# Patient Record
Sex: FEMALE | Race: WHITE | NOT HISPANIC OR LATINO | Employment: OTHER
[De-identification: names, ages, dates, MRNs, and addresses within clinical notes are randomized per-mention and may not be internally consistent; named-entity substitution may affect disease eponyms.]

---

## 2017-02-20 ENCOUNTER — RECORDS - HEALTHEAST (OUTPATIENT)
Dept: ADMINISTRATIVE | Facility: OTHER | Age: 77
End: 2017-02-20

## 2017-02-23 ENCOUNTER — RECORDS - HEALTHEAST (OUTPATIENT)
Dept: LAB | Facility: CLINIC | Age: 77
End: 2017-02-23

## 2017-02-23 LAB
CHOLEST SERPL-MCNC: 305 MG/DL
FASTING STATUS PATIENT QL REPORTED: YES
HDLC SERPL-MCNC: 36 MG/DL
LDLC SERPL CALC-MCNC: 216 MG/DL
TRIGL SERPL-MCNC: 264 MG/DL

## 2017-02-27 ENCOUNTER — RECORDS - HEALTHEAST (OUTPATIENT)
Dept: LAB | Facility: CLINIC | Age: 77
End: 2017-02-27

## 2017-02-27 LAB — AST SERPL W P-5'-P-CCNC: 16 U/L (ref 0–40)

## 2017-02-28 ENCOUNTER — HOSPITAL ENCOUNTER (OUTPATIENT)
Dept: MAMMOGRAPHY | Facility: HOSPITAL | Age: 77
Discharge: HOME | End: 2017-02-28
Attending: FAMILY MEDICINE

## 2017-02-28 DIAGNOSIS — Z12.31 VISIT FOR SCREENING MAMMOGRAM: ICD-10-CM

## 2018-03-01 ENCOUNTER — HOSPITAL ENCOUNTER (OUTPATIENT)
Dept: MAMMOGRAPHY | Facility: HOSPITAL | Age: 78
Discharge: HOME | End: 2018-03-01
Attending: FAMILY MEDICINE

## 2018-03-01 DIAGNOSIS — Z12.31 VISIT FOR SCREENING MAMMOGRAM: ICD-10-CM

## 2018-04-13 ENCOUNTER — RECORDS - HEALTHEAST (OUTPATIENT)
Dept: ADMINISTRATIVE | Facility: OTHER | Age: 78
End: 2018-04-13

## 2018-04-15 ENCOUNTER — COMMUNICATION - HEALTHEAST (OUTPATIENT)
Dept: SCHEDULING | Facility: CLINIC | Age: 78
End: 2018-04-15

## 2018-04-18 ENCOUNTER — AMBULATORY - HEALTHEAST (OUTPATIENT)
Dept: CARDIOLOGY | Facility: CLINIC | Age: 78
End: 2018-04-18

## 2018-04-18 ENCOUNTER — OFFICE VISIT - HEALTHEAST (OUTPATIENT)
Dept: CARDIOLOGY | Facility: CLINIC | Age: 78
End: 2018-04-18

## 2018-04-18 DIAGNOSIS — R07.2 PRECORDIAL PAIN: ICD-10-CM

## 2018-04-18 DIAGNOSIS — I10 ESSENTIAL HYPERTENSION: ICD-10-CM

## 2018-04-18 DIAGNOSIS — I48.0 PAROXYSMAL ATRIAL FIBRILLATION (H): ICD-10-CM

## 2018-04-18 LAB
ATRIAL RATE - MUSE: 54 BPM
DIASTOLIC BLOOD PRESSURE - MUSE: NORMAL MMHG
INTERPRETATION ECG - MUSE: NORMAL
P AXIS - MUSE: -2 DEGREES
PR INTERVAL - MUSE: 150 MS
QRS DURATION - MUSE: 78 MS
QT - MUSE: 486 MS
QTC - MUSE: 460 MS
R AXIS - MUSE: 12 DEGREES
SYSTOLIC BLOOD PRESSURE - MUSE: NORMAL MMHG
T AXIS - MUSE: 31 DEGREES
VENTRICULAR RATE- MUSE: 54 BPM

## 2018-04-18 RX ORDER — AMOXICILLIN 500 MG/1
CAPSULE ORAL DAILY PRN
Refills: 0 | Status: SHIPPED | COMMUNITY
Start: 2018-01-30

## 2018-04-18 ASSESSMENT — MIFFLIN-ST. JEOR: SCORE: 1129.19

## 2018-04-19 ENCOUNTER — RECORDS - HEALTHEAST (OUTPATIENT)
Dept: LAB | Facility: CLINIC | Age: 78
End: 2018-04-19

## 2018-04-19 LAB
ANION GAP SERPL CALCULATED.3IONS-SCNC: 16 MMOL/L (ref 5–18)
BUN SERPL-MCNC: 35 MG/DL (ref 8–28)
CALCIUM SERPL-MCNC: 10.1 MG/DL (ref 8.5–10.5)
CHLORIDE BLD-SCNC: 104 MMOL/L (ref 98–107)
CHOLEST SERPL-MCNC: 173 MG/DL
CO2 SERPL-SCNC: 20 MMOL/L (ref 22–31)
CREAT SERPL-MCNC: 1.05 MG/DL (ref 0.6–1.1)
FASTING STATUS PATIENT QL REPORTED: ABNORMAL
GFR SERPL CREATININE-BSD FRML MDRD: 51 ML/MIN/1.73M2
GLUCOSE BLD-MCNC: 113 MG/DL (ref 70–125)
HDLC SERPL-MCNC: 39 MG/DL
LDLC SERPL CALC-MCNC: 101 MG/DL
POTASSIUM BLD-SCNC: 4.1 MMOL/L (ref 3.5–5)
SODIUM SERPL-SCNC: 140 MMOL/L (ref 136–145)
TRIGL SERPL-MCNC: 167 MG/DL
TSH SERPL DL<=0.005 MIU/L-ACNC: 1.86 UIU/ML (ref 0.3–5)

## 2018-04-20 ENCOUNTER — COMMUNICATION - HEALTHEAST (OUTPATIENT)
Dept: CARE COORDINATION | Facility: CLINIC | Age: 78
End: 2018-04-20

## 2018-04-20 ENCOUNTER — COMMUNICATION - HEALTHEAST (OUTPATIENT)
Dept: CARDIOLOGY | Facility: CLINIC | Age: 78
End: 2018-04-20

## 2018-04-30 ENCOUNTER — HOSPITAL ENCOUNTER (OUTPATIENT)
Dept: NUCLEAR MEDICINE | Facility: HOSPITAL | Age: 78
Discharge: HOME | End: 2018-04-30
Attending: INTERNAL MEDICINE

## 2018-04-30 ENCOUNTER — HOSPITAL ENCOUNTER (OUTPATIENT)
Dept: CARDIOLOGY | Facility: HOSPITAL | Age: 78
Discharge: HOME | End: 2018-04-30
Attending: INTERNAL MEDICINE

## 2018-04-30 DIAGNOSIS — R07.2 PRECORDIAL PAIN: ICD-10-CM

## 2018-04-30 DIAGNOSIS — I48.0 PAROXYSMAL ATRIAL FIBRILLATION (H): ICD-10-CM

## 2018-04-30 LAB
CV STRESS CURRENT BP HE: NORMAL
CV STRESS CURRENT HR HE: 100
CV STRESS CURRENT HR HE: 61
CV STRESS CURRENT HR HE: 62
CV STRESS CURRENT HR HE: 69
CV STRESS CURRENT HR HE: 83
CV STRESS CURRENT HR HE: 83
CV STRESS CURRENT HR HE: 84
CV STRESS CURRENT HR HE: 85
CV STRESS CURRENT HR HE: 85
CV STRESS CURRENT HR HE: 87
CV STRESS CURRENT HR HE: 87
CV STRESS CURRENT HR HE: 92
CV STRESS CURRENT HR HE: 99
CV STRESS DEVIATION TIME HE: NORMAL
CV STRESS ECHO PERCENT HR HE: NORMAL
CV STRESS EXERCISE STAGE HE: NORMAL
CV STRESS FINAL RESTING BP HE: NORMAL
CV STRESS FINAL RESTING HR HE: 85
CV STRESS MAX HR HE: 100
CV STRESS MAX TREADMILL GRADE HE: 0
CV STRESS MAX TREADMILL SPEED HE: 0
CV STRESS PEAK DIA BP HE: NORMAL
CV STRESS PEAK SYS BP HE: NORMAL
CV STRESS PHASE HE: NORMAL
CV STRESS PROTOCOL HE: NORMAL
CV STRESS RESTING PT POSITION HE: NORMAL
CV STRESS ST DEVIATION AMOUNT HE: NORMAL
CV STRESS ST DEVIATION ELEVATION HE: NORMAL
CV STRESS ST EVELATION AMOUNT HE: NORMAL
CV STRESS TEST TYPE HE: NORMAL
CV STRESS TOTAL STAGE TIME MIN 1 HE: NORMAL
NUC STRESS EJECTION FRACTION: 75 %
STRESS ECHO BASELINE BP: NORMAL
STRESS ECHO BASELINE HR: 61
STRESS ECHO CALCULATED PERCENT HR: 69 %
STRESS ECHO LAST STRESS BP: NORMAL
STRESS ECHO LAST STRESS HR: 87

## 2018-04-30 ASSESSMENT — MIFFLIN-ST. JEOR: SCORE: 1156.74

## 2018-05-07 ENCOUNTER — OFFICE VISIT - HEALTHEAST (OUTPATIENT)
Dept: CARDIOLOGY | Facility: CLINIC | Age: 78
End: 2018-05-07

## 2018-05-07 ENCOUNTER — COMMUNICATION - HEALTHEAST (OUTPATIENT)
Dept: CARDIOLOGY | Facility: CLINIC | Age: 78
End: 2018-05-07

## 2018-05-07 DIAGNOSIS — I50.30 HEART FAILURE WITH PRESERVED EJECTION FRACTION (H): ICD-10-CM

## 2018-05-07 DIAGNOSIS — I10 ESSENTIAL HYPERTENSION: ICD-10-CM

## 2018-05-07 DIAGNOSIS — I48.0 PAROXYSMAL ATRIAL FIBRILLATION (H): ICD-10-CM

## 2018-05-07 ASSESSMENT — MIFFLIN-ST. JEOR: SCORE: 1128.39

## 2018-06-04 ENCOUNTER — COMMUNICATION - HEALTHEAST (OUTPATIENT)
Dept: CARE COORDINATION | Facility: CLINIC | Age: 78
End: 2018-06-04

## 2018-06-07 ENCOUNTER — COMMUNICATION - HEALTHEAST (OUTPATIENT)
Dept: CARDIOLOGY | Facility: CLINIC | Age: 78
End: 2018-06-07

## 2018-07-09 ENCOUNTER — AMBULATORY - HEALTHEAST (OUTPATIENT)
Dept: CARDIOLOGY | Facility: CLINIC | Age: 78
End: 2018-07-09

## 2018-07-09 ENCOUNTER — RECORDS - HEALTHEAST (OUTPATIENT)
Dept: ADMINISTRATIVE | Facility: OTHER | Age: 78
End: 2018-07-09

## 2018-07-11 ENCOUNTER — OFFICE VISIT - HEALTHEAST (OUTPATIENT)
Dept: CARDIOLOGY | Facility: CLINIC | Age: 78
End: 2018-07-11

## 2018-07-11 DIAGNOSIS — I48.0 PAROXYSMAL ATRIAL FIBRILLATION (H): ICD-10-CM

## 2018-07-11 DIAGNOSIS — I10 ESSENTIAL HYPERTENSION: ICD-10-CM

## 2018-07-11 LAB
ATRIAL RATE - MUSE: 69 BPM
DIASTOLIC BLOOD PRESSURE - MUSE: NORMAL MMHG
INTERPRETATION ECG - MUSE: NORMAL
P AXIS - MUSE: 27 DEGREES
PR INTERVAL - MUSE: 154 MS
QRS DURATION - MUSE: 78 MS
QT - MUSE: 412 MS
QTC - MUSE: 441 MS
R AXIS - MUSE: 16 DEGREES
SYSTOLIC BLOOD PRESSURE - MUSE: NORMAL MMHG
T AXIS - MUSE: 38 DEGREES
VENTRICULAR RATE- MUSE: 69 BPM

## 2018-07-11 ASSESSMENT — MIFFLIN-ST. JEOR: SCORE: 1120.46

## 2018-12-27 ENCOUNTER — COMMUNICATION - HEALTHEAST (OUTPATIENT)
Dept: ADMINISTRATIVE | Facility: CLINIC | Age: 78
End: 2018-12-27

## 2019-04-04 ENCOUNTER — RECORDS - HEALTHEAST (OUTPATIENT)
Dept: LAB | Facility: CLINIC | Age: 79
End: 2019-04-04

## 2019-04-05 LAB
ANION GAP SERPL CALCULATED.3IONS-SCNC: 12 MMOL/L (ref 5–18)
BUN SERPL-MCNC: 18 MG/DL (ref 8–28)
CALCIUM SERPL-MCNC: 10.2 MG/DL (ref 8.5–10.5)
CHLORIDE BLD-SCNC: 106 MMOL/L (ref 98–107)
CHOLEST SERPL-MCNC: 165 MG/DL
CO2 SERPL-SCNC: 23 MMOL/L (ref 22–31)
CREAT SERPL-MCNC: 0.68 MG/DL (ref 0.6–1.1)
FASTING STATUS PATIENT QL REPORTED: ABNORMAL
GFR SERPL CREATININE-BSD FRML MDRD: >60 ML/MIN/1.73M2
GLUCOSE BLD-MCNC: 97 MG/DL (ref 70–125)
HDLC SERPL-MCNC: 43 MG/DL
LDLC SERPL CALC-MCNC: 76 MG/DL
POTASSIUM BLD-SCNC: 3.9 MMOL/L (ref 3.5–5)
SODIUM SERPL-SCNC: 141 MMOL/L (ref 136–145)
TRIGL SERPL-MCNC: 229 MG/DL

## 2019-04-08 ENCOUNTER — RECORDS - HEALTHEAST (OUTPATIENT)
Dept: ADMINISTRATIVE | Facility: OTHER | Age: 79
End: 2019-04-08

## 2019-04-08 ENCOUNTER — AMBULATORY - HEALTHEAST (OUTPATIENT)
Dept: CARDIOLOGY | Facility: CLINIC | Age: 79
End: 2019-04-08

## 2019-04-12 ENCOUNTER — OFFICE VISIT - HEALTHEAST (OUTPATIENT)
Dept: CARDIOLOGY | Facility: CLINIC | Age: 79
End: 2019-04-12

## 2019-04-12 DIAGNOSIS — I48.0 PAROXYSMAL ATRIAL FIBRILLATION (H): ICD-10-CM

## 2019-04-12 DIAGNOSIS — I10 ESSENTIAL HYPERTENSION: ICD-10-CM

## 2019-04-12 RX ORDER — FLUOXETINE 10 MG/1
10 CAPSULE ORAL DAILY
Refills: 11 | Status: SHIPPED | COMMUNITY
Start: 2019-04-02

## 2019-04-12 ASSESSMENT — MIFFLIN-ST. JEOR: SCORE: 1110.81

## 2019-04-16 LAB
ATRIAL RATE - MUSE: 67 BPM
DIASTOLIC BLOOD PRESSURE - MUSE: NORMAL MMHG
INTERPRETATION ECG - MUSE: NORMAL
P AXIS - MUSE: 9 DEGREES
PR INTERVAL - MUSE: 140 MS
QRS DURATION - MUSE: 70 MS
QT - MUSE: 414 MS
QTC - MUSE: 437 MS
R AXIS - MUSE: 42 DEGREES
SYSTOLIC BLOOD PRESSURE - MUSE: NORMAL MMHG
T AXIS - MUSE: 52 DEGREES
VENTRICULAR RATE- MUSE: 67 BPM

## 2019-04-20 ENCOUNTER — COMMUNICATION - HEALTHEAST (OUTPATIENT)
Dept: CARDIOLOGY | Facility: CLINIC | Age: 79
End: 2019-04-20

## 2019-04-20 DIAGNOSIS — I48.0 PAROXYSMAL ATRIAL FIBRILLATION (H): ICD-10-CM

## 2019-04-28 ENCOUNTER — COMMUNICATION - HEALTHEAST (OUTPATIENT)
Dept: CARDIOLOGY | Facility: CLINIC | Age: 79
End: 2019-04-28

## 2019-04-28 DIAGNOSIS — I48.0 PAROXYSMAL ATRIAL FIBRILLATION (H): ICD-10-CM

## 2019-09-11 ENCOUNTER — RECORDS - HEALTHEAST (OUTPATIENT)
Dept: LAB | Facility: CLINIC | Age: 79
End: 2019-09-11

## 2019-09-11 LAB
ANION GAP SERPL CALCULATED.3IONS-SCNC: 10 MMOL/L (ref 5–18)
BUN SERPL-MCNC: 22 MG/DL (ref 8–28)
CALCIUM SERPL-MCNC: 10 MG/DL (ref 8.5–10.5)
CHLORIDE BLD-SCNC: 105 MMOL/L (ref 98–107)
CO2 SERPL-SCNC: 27 MMOL/L (ref 22–31)
CREAT SERPL-MCNC: 0.71 MG/DL (ref 0.6–1.1)
GFR SERPL CREATININE-BSD FRML MDRD: >60 ML/MIN/1.73M2
GLUCOSE BLD-MCNC: 92 MG/DL (ref 70–125)
POTASSIUM BLD-SCNC: 4.2 MMOL/L (ref 3.5–5)
SODIUM SERPL-SCNC: 142 MMOL/L (ref 136–145)

## 2020-01-23 ENCOUNTER — COMMUNICATION - HEALTHEAST (OUTPATIENT)
Dept: CARDIOLOGY | Facility: CLINIC | Age: 80
End: 2020-01-23

## 2020-01-23 DIAGNOSIS — I48.0 PAROXYSMAL ATRIAL FIBRILLATION (H): ICD-10-CM

## 2020-02-02 ENCOUNTER — COMMUNICATION - HEALTHEAST (OUTPATIENT)
Dept: CARDIOLOGY | Facility: CLINIC | Age: 80
End: 2020-02-02

## 2020-02-02 DIAGNOSIS — I48.0 PAROXYSMAL ATRIAL FIBRILLATION (H): ICD-10-CM

## 2020-03-24 ENCOUNTER — RECORDS - HEALTHEAST (OUTPATIENT)
Dept: ADMINISTRATIVE | Facility: OTHER | Age: 80
End: 2020-03-24

## 2020-03-24 ENCOUNTER — AMBULATORY - HEALTHEAST (OUTPATIENT)
Dept: CARDIOLOGY | Facility: CLINIC | Age: 80
End: 2020-03-24

## 2020-03-27 ENCOUNTER — COMMUNICATION - HEALTHEAST (OUTPATIENT)
Dept: CARDIOLOGY | Facility: CLINIC | Age: 80
End: 2020-03-27

## 2020-03-27 ENCOUNTER — OFFICE VISIT - HEALTHEAST (OUTPATIENT)
Dept: CARDIOLOGY | Facility: CLINIC | Age: 80
End: 2020-03-27

## 2020-03-27 DIAGNOSIS — I48.0 PAROXYSMAL ATRIAL FIBRILLATION (H): ICD-10-CM

## 2020-03-27 DIAGNOSIS — I10 ESSENTIAL HYPERTENSION: ICD-10-CM

## 2020-04-21 ENCOUNTER — COMMUNICATION - HEALTHEAST (OUTPATIENT)
Dept: CARDIOLOGY | Facility: CLINIC | Age: 80
End: 2020-04-21

## 2020-04-21 DIAGNOSIS — I48.0 PAROXYSMAL ATRIAL FIBRILLATION (H): ICD-10-CM

## 2020-05-21 ENCOUNTER — RECORDS - HEALTHEAST (OUTPATIENT)
Dept: LAB | Facility: CLINIC | Age: 80
End: 2020-05-21

## 2020-05-21 LAB
ANION GAP SERPL CALCULATED.3IONS-SCNC: 11 MMOL/L (ref 5–18)
BUN SERPL-MCNC: 23 MG/DL (ref 8–28)
CALCIUM SERPL-MCNC: 9.4 MG/DL (ref 8.5–10.5)
CHLORIDE BLD-SCNC: 105 MMOL/L (ref 98–107)
CHOLEST SERPL-MCNC: 176 MG/DL
CO2 SERPL-SCNC: 26 MMOL/L (ref 22–31)
CREAT SERPL-MCNC: 0.72 MG/DL (ref 0.6–1.1)
FASTING STATUS PATIENT QL REPORTED: ABNORMAL
GFR SERPL CREATININE-BSD FRML MDRD: >60 ML/MIN/1.73M2
GLUCOSE BLD-MCNC: 97 MG/DL (ref 70–125)
HDLC SERPL-MCNC: 41 MG/DL
LDLC SERPL CALC-MCNC: 97 MG/DL
POTASSIUM BLD-SCNC: 4.2 MMOL/L (ref 3.5–5)
SODIUM SERPL-SCNC: 142 MMOL/L (ref 136–145)
TRIGL SERPL-MCNC: 189 MG/DL
URATE SERPL-MCNC: 5.7 MG/DL (ref 2–7.5)

## 2020-07-17 ENCOUNTER — COMMUNICATION - HEALTHEAST (OUTPATIENT)
Dept: CARDIOLOGY | Facility: CLINIC | Age: 80
End: 2020-07-17

## 2020-07-17 DIAGNOSIS — I48.0 PAROXYSMAL ATRIAL FIBRILLATION (H): ICD-10-CM

## 2021-01-21 ENCOUNTER — COMMUNICATION - HEALTHEAST (OUTPATIENT)
Dept: CARDIOLOGY | Facility: CLINIC | Age: 81
End: 2021-01-21

## 2021-01-21 DIAGNOSIS — I48.0 PAROXYSMAL ATRIAL FIBRILLATION (H): ICD-10-CM

## 2021-02-26 ENCOUNTER — OFFICE VISIT - HEALTHEAST (OUTPATIENT)
Dept: CARDIOLOGY | Facility: CLINIC | Age: 81
End: 2021-02-26

## 2021-02-26 DIAGNOSIS — I10 ESSENTIAL HYPERTENSION: ICD-10-CM

## 2021-02-26 DIAGNOSIS — I48.0 PAROXYSMAL ATRIAL FIBRILLATION (H): ICD-10-CM

## 2021-02-26 RX ORDER — AMLODIPINE BESYLATE 5 MG/1
5 TABLET ORAL DAILY
Qty: 90 TABLET | Refills: 3 | Status: SHIPPED | OUTPATIENT
Start: 2021-02-26 | End: 2022-02-16

## 2021-04-12 ENCOUNTER — RECORDS - HEALTHEAST (OUTPATIENT)
Dept: ADMINISTRATIVE | Facility: OTHER | Age: 81
End: 2021-04-12

## 2021-04-12 ENCOUNTER — AMBULATORY - HEALTHEAST (OUTPATIENT)
Dept: CARDIOLOGY | Facility: CLINIC | Age: 81
End: 2021-04-12

## 2021-04-14 ENCOUNTER — OFFICE VISIT - HEALTHEAST (OUTPATIENT)
Dept: CARDIOLOGY | Facility: CLINIC | Age: 81
End: 2021-04-14

## 2021-04-14 DIAGNOSIS — I10 ESSENTIAL HYPERTENSION: ICD-10-CM

## 2021-04-14 DIAGNOSIS — I48.0 PAROXYSMAL ATRIAL FIBRILLATION (H): ICD-10-CM

## 2021-04-14 ASSESSMENT — MIFFLIN-ST. JEOR: SCORE: 1178.85

## 2021-04-25 ENCOUNTER — COMMUNICATION - HEALTHEAST (OUTPATIENT)
Dept: CARDIOLOGY | Facility: CLINIC | Age: 81
End: 2021-04-25

## 2021-04-25 DIAGNOSIS — I48.0 PAROXYSMAL ATRIAL FIBRILLATION (H): ICD-10-CM

## 2021-04-26 RX ORDER — APIXABAN 5 MG/1
TABLET, FILM COATED ORAL
Qty: 180 TABLET | Refills: 0 | Status: SHIPPED | OUTPATIENT
Start: 2021-04-26 | End: 2021-07-15

## 2021-04-26 RX ORDER — SOTALOL HYDROCHLORIDE 80 MG/1
80 TABLET ORAL EVERY 12 HOURS
Qty: 180 TABLET | Refills: 0 | Status: SHIPPED | OUTPATIENT
Start: 2021-04-26 | End: 2021-07-21

## 2021-05-19 ENCOUNTER — RECORDS - HEALTHEAST (OUTPATIENT)
Dept: LAB | Facility: CLINIC | Age: 81
End: 2021-05-19

## 2021-05-19 LAB
ANION GAP SERPL CALCULATED.3IONS-SCNC: 12 MMOL/L (ref 5–18)
BUN SERPL-MCNC: 18 MG/DL (ref 8–28)
CALCIUM SERPL-MCNC: 9 MG/DL (ref 8.5–10.5)
CHLORIDE BLD-SCNC: 102 MMOL/L (ref 98–107)
CHOLEST SERPL-MCNC: 179 MG/DL
CO2 SERPL-SCNC: 28 MMOL/L (ref 22–31)
CREAT SERPL-MCNC: 0.72 MG/DL (ref 0.6–1.1)
FASTING STATUS PATIENT QL REPORTED: ABNORMAL
GFR SERPL CREATININE-BSD FRML MDRD: >60 ML/MIN/1.73M2
GLUCOSE BLD-MCNC: 88 MG/DL (ref 70–125)
HDLC SERPL-MCNC: 40 MG/DL
LDLC SERPL CALC-MCNC: 96 MG/DL
POTASSIUM BLD-SCNC: 3.4 MMOL/L (ref 3.5–5)
SODIUM SERPL-SCNC: 142 MMOL/L (ref 136–145)
TRIGL SERPL-MCNC: 215 MG/DL

## 2021-05-25 ENCOUNTER — RECORDS - HEALTHEAST (OUTPATIENT)
Dept: ADMINISTRATIVE | Facility: CLINIC | Age: 81
End: 2021-05-25

## 2021-05-27 ENCOUNTER — RECORDS - HEALTHEAST (OUTPATIENT)
Dept: ADMINISTRATIVE | Facility: CLINIC | Age: 81
End: 2021-05-27

## 2021-05-28 ENCOUNTER — RECORDS - HEALTHEAST (OUTPATIENT)
Dept: ADMINISTRATIVE | Facility: CLINIC | Age: 81
End: 2021-05-28

## 2021-05-30 VITALS — WEIGHT: 153 LBS | BODY MASS INDEX: 27.54 KG/M2

## 2021-05-30 VITALS — HEIGHT: 63 IN | BODY MASS INDEX: 28.62 KG/M2

## 2021-06-01 VITALS — BODY MASS INDEX: 29.79 KG/M2 | WEIGHT: 157.8 LBS | HEIGHT: 61 IN

## 2021-06-01 VITALS — WEIGHT: 155 LBS | HEIGHT: 61 IN | BODY MASS INDEX: 29.27 KG/M2

## 2021-06-01 VITALS — HEIGHT: 63 IN | BODY MASS INDEX: 27.64 KG/M2 | WEIGHT: 156 LBS

## 2021-06-01 VITALS — WEIGHT: 155 LBS | HEIGHT: 62 IN | BODY MASS INDEX: 28.52 KG/M2

## 2021-06-03 VITALS — BODY MASS INDEX: 28.7 KG/M2 | HEIGHT: 61 IN | WEIGHT: 152 LBS

## 2021-06-04 VITALS
WEIGHT: 165 LBS | HEART RATE: 69 BPM | SYSTOLIC BLOOD PRESSURE: 117 MMHG | BODY MASS INDEX: 30.92 KG/M2 | DIASTOLIC BLOOD PRESSURE: 79 MMHG

## 2021-06-05 VITALS
HEART RATE: 76 BPM | WEIGHT: 167 LBS | RESPIRATION RATE: 14 BRPM | SYSTOLIC BLOOD PRESSURE: 120 MMHG | BODY MASS INDEX: 31.53 KG/M2 | DIASTOLIC BLOOD PRESSURE: 78 MMHG | HEIGHT: 61 IN

## 2021-06-05 VITALS
RESPIRATION RATE: 16 BRPM | BODY MASS INDEX: 30.36 KG/M2 | DIASTOLIC BLOOD PRESSURE: 90 MMHG | SYSTOLIC BLOOD PRESSURE: 148 MMHG | WEIGHT: 162 LBS | HEART RATE: 72 BPM | OXYGEN SATURATION: 100 %

## 2021-06-07 NOTE — TELEPHONE ENCOUNTER
----- Message from Trevin Soliman MD sent at 3/23/2020  3:44 PM CDT -----  Regarding: RE: clinic visit 3/27  If patient is comfortable with it, feel we could actually push the visit out a few months (3) and do EDG at time. If patient would like to do a phone visit now as well, that would be fine.   ----- Message -----  From: Deisy Vásquez RN  Sent: 3/23/2020   1:45 PM CDT  To: Trevin Soliman MD  Subject: clinic visit 3/27                                Hi,  Patient scheduled in clinic visit 3/27.  Will she need an ECG for Qt check or can this visit be changed to tele?  Thank you- Deisy

## 2021-06-16 PROBLEM — I48.91 ATRIAL FIBRILLATION (H): Status: ACTIVE | Noted: 2018-04-13

## 2021-06-16 PROBLEM — I50.30 HEART FAILURE WITH PRESERVED EJECTION FRACTION (H): Status: ACTIVE | Noted: 2018-05-07

## 2021-06-26 ENCOUNTER — HEALTH MAINTENANCE LETTER (OUTPATIENT)
Age: 81
End: 2021-06-26

## 2021-06-26 NOTE — PROGRESS NOTES
Progress Notes by Trevin Soliman MD at 7/11/2018 12:50 PM     Author: Trevin Soliman MD Service: -- Author Type: Physician    Filed: 7/11/2018  1:37 PM Encounter Date: 7/11/2018 Status: Signed    : Trevin Soliman MD (Physician)                  [email or web address]  [phone]         Thank you Dr. Borrego for asking the [facility] Heart Care team to participate in the care of your patient, Vanessa Matthew.     Impression and Plan     1.  Atrial fibrillation (paroxysmal).  This has been subjectively and objectively quiescent since her dismissal from the hospital in April 2018.  12-lead ECG performed in the office today confirms maintenance of sinus rhythm with reasonable QTc at 441 ms. Elysia [id number] Score is 4 yielding an annual embolic risk of 4.8-6.7%.  She has been maintained on apixaban (Eliquis ) for CVA prophylaxis. Plan:    Continue sotalol.    Continue apixaban (Eliquis ) for CVA prophylaxis.    3.  Hypertension.  Blood pressure is quite reasonable knee office today at 116/70 mmHg.    Plan on follow-up in 6 months.           History of Present Illness    Once again I would like to thank you again for asking me to participate in the care of your patient, Vanessa Matthew.  As you know, but to reiterate for my own records, Vanessa Matthew is a 77 y.o. female seen on 14 April 2018.  Patient was noted to have evidence of paroxysmal atrial fibrillation.  She had spontaneously converted during that hospitalization.  Patient was started on sotalol and also apixaban (Eliquis ) for CVA prophylaxis.     Since her last visit, Vanessa states she has had no subjective recurrence of the atrial fibrillation.  Patient is without cardiovascular complaints today.  She denies chest pain, shortness of breath, or any symptoms of lightheadedness.  She is pleased with how she is performing from a cardiovascular standpoint.    Further review of systems is otherwise negative/noncontributory  "(medical record and 13 point review of systems reviewed as well and pertinent positives noted).         Cardiac Diagnostics      Echocardiogram 13 April 2018 (personally reviewed):  1. The left ventricular cavity is somewhat small.  Left ventricular systolic performance is mildly hyperdynamic with ejection fraction of 70%.   2. There is mild concentric increase in left ventricular wall thickness.  3. No significant valvular heart disease is identified on this study.   4. Normal right ventricular size and systolic performance.   5. There is mild to moderate left atrial enlargement.     Nuclear perfusion imaging study 30 April 2018:  1. No evidence of infarct or ischemia.  2. Normal left ventricular systolic performance with ejection fraction of 75    12-lead ECG (personally reviewed) 11 July 2018: Sinus rhythm.   ms.    12-lead ECG (personally reviewed) 18 April 2018: Sinus rhythm.  Heart rate 54 bpm.  QTc 460 ms.     12-lead ECG (personally reviewed) 15 April 2018 at 1555: Normal sinus rhythm.  Normal ECG.     12-lead ECG (personally reviewed) 15 April 2018 at 0959: Normal sinus rhythm.  Normal ECG.     12-lead ECG (personally reviewed) 13 April 2018 at 1512:   Atrial fibrillation with heart rate of 99 bpm.  Nonspecific T-wave changes.         Physical Examination       /70 (Patient Site: Right Arm, Patient Position: Sitting, Cuff Size: Adult Regular)  Pulse 76  Resp 16  Ht 5' 1\" (1.549 m)  Wt 155 lb (70.3 kg)  BMI 29.29 kg/m2        Wt Readings from Last 3 Encounters:   07/11/18 155 lb (70.3 kg)   05/07/18 155 lb (70.3 kg)   04/30/18 156 lb (70.8 kg)     The patient is alert and oriented times three. Sclerae are anicteric. Mucosal membranes are moist. Jugular venous pressure is normal. No significant adenopathy/thyromegally appreciated. Lungs are clear with good expansion. On cardiovascular exam, the patient has a regular S1 and S2. Abdomen is soft and non-tender. Extremities reveal no clubbing, " cyanosis, or edema.           Family History/Social History/Risk Factors   Patient does not smoke.  Family history of hypertension.         Medications  Allergies   Current Outpatient Prescriptions   Medication Sig Dispense Refill   ? acetaminophen (TYLENOL) 500 MG tablet Take 1,000 mg by mouth every 6 (six) hours as needed for pain.     ? allopurinol (ZYLOPRIM) 100 MG tablet Take 100 mg by mouth every other day.      ? amoxicillin (AMOXIL) 500 MG capsule daily as needed (With dental appointments).   0   ? apixaban (ELIQUIS) 5 mg Tab tablet Take 1 tablet (5 mg total) by mouth 2 (two) times a day. 60 tablet 11   ? calcium carbonate (OS-SARAI) 600 mg (1,500 mg) tablet Take 600 mg by mouth daily.     ? furosemide (LASIX) 40 MG tablet Take 20 mg by mouth daily.     ? losartan (COZAAR) 100 MG tablet Take 100 mg by mouth daily.     ? sotalol (BETAPACE) 80 MG tablet Take 1 tablet (80 mg total) by mouth every 12 (twelve) hours. 60 tablet 10   ? VALERIAN ORAL Take 1,030 mg by mouth at bedtime as needed (sleep).       No current facility-administered medications for this visit.       Allergies   Allergen Reactions   ? Lipitor [Atorvastatin]      numbness   ? Pravastatin      numbness   ? Iodine Rash          Lab Results   Lab Results   Component Value Date     04/19/2018    K 4.1 04/19/2018     04/19/2018    CO2 20 (L) 04/19/2018    BUN 35 (H) 04/19/2018    CREATININE 1.05 04/19/2018    CALCIUM 10.1 04/19/2018     Lab Results   Component Value Date    WBC 8.9 04/15/2018    HGB 15.2 04/15/2018    HCT 48.0 (H) 04/15/2018    MCV 91 04/15/2018     04/15/2018     Lab Results   Component Value Date    CHOL 173 04/19/2018    TRIG 167 (H) 04/19/2018    HDL 39 (L) 04/19/2018    LDLCALC 101 04/19/2018    LDLDIRECT 113 09/09/2014     Lab Results   Component Value Date    INR 0.99 04/13/2018     Lab Results   Component Value Date     (H) 04/13/2018     Lab Results   Component Value Date    TROPONINI 0.02  04/15/2018    TROPONINI 0.07 04/14/2018    TROPONINI 0.05 04/13/2018     Lab Results   Component Value Date    TSH 1.86 04/19/2018

## 2021-06-26 NOTE — PROGRESS NOTES
Progress Notes by Trevin Soliman MD at 4/18/2018  2:20 PM     Author: Trevin Soliman MD Service: -- Author Type: Physician    Filed: 4/18/2018  3:00 PM Encounter Date: 4/18/2018 Status: Signed    : Trevin Soliman MD (Physician)                  [email or web address]  [phone]         Thank you Dr. Gibson for asking the [facility] Heart Care team to participate in the care of your patient, Vanessa Matthew.     Impression and Plan     1.  Atrial fibrillation (paroxysmal).  This has been subjectively and objectively quiescent since her dismissal from the hospital.  12-lead ECG performed in the office today confirms maintenance of sinus rhythm with reasonable QTc at 460 ms. Elysia [id number] Score is 4 yielding an annual embolic risk of 4.8-6.7%.  She has been maintained on apixaban (Eliquis ) for CVA prophylaxis. Plan:    Continue sotalol.    Continue apixaban (Eliquis ) for CVA prophylaxis.    2.  Chest discomfort.  Patient reports no recurrent episodes since her dismissal from the hospital.  She already has a regadenoson nuclear perfusion study ordered.  Plan to follow-up results accordingly.    3.  Hypertension.  Patient had continued to take her atenolol despite initiation of sotalol.  She is now on five antihypertensive therapies.  Her blood pressure is running low and she has been having some lightheadedness.  Plan:    Discontinue amlodipine.    Discontinue atenolol.    Further recommendations pending nuclear perfusion imaging results.  Regardless, we will also arrange for follow-up in approximately 6 weeks.           History of Present Illness    Once again I would like to thank you again for asking me to participate in the care of your patient, Vanessa Matthew.  As you know, but to reiterate for my own records, Vanessa Matthew is a 77 y.o. female recently seen by my colleague, Dr. Eve Menard, on 14 April 2018.  Patient was noted to have evidence of paroxysmal  "atrial fibrillation.  She had spontaneously converted during that hospitalization.  Patient was started on sotalol and also apixaban (Eliquis ) for CVA prophylaxis.    Since her dismissal from the hospital, patient states she has had no subjective recurrence of the atrial fibrillation.  She has, however, had some lightheadedness.  She has  not had any ivonne lightheaded spells or loss of consciousness, however.  She denies any significant chest discomfort symptoms at this time.    Further review of systems is otherwise negative/noncontributory (medical record and 13 point review of systems reviewed as well and pertinent positives noted).         Cardiac Diagnostics      Echocardiogram 13 April 2018 (personally reviewed):  1. The left ventricular cavity is somewhat small.  Left ventricular systolic performance is mildly hyperdynamic with ejection fraction of 70%.   2. There is mild concentric increase in left ventricular wall thickness.  3. No significant valvular heart disease is identified on this study.   4. Normal right ventricular size and systolic performance.   5. There is mild to moderate left atrial enlargement.     12-lead ECG (personally reviewed) 18 April 2018: Sinus rhythm.  Heart rate 54 bpm.  QTc 460 ms.    12-lead ECG (personally reviewed) 15 April 2018 at 1555: Normal sinus rhythm.  Normal ECG.    12-lead ECG (personally reviewed) 15 April 2018 at 0959: Normal sinus rhythm.  Normal ECG.    12-lead ECG (personally reviewed) 13 April 2018 at 1512:   Atrial fibrillation with heart rate of 99 bpm.  Nonspecific T-wave changes.         Physical Examination       BP 96/66 (Patient Site: Left Arm, Patient Position: Sitting, Cuff Size: Adult Regular)  Pulse 60  Resp 16  Ht 5' 0.75\" (1.543 m)  Wt 157 lb 12.8 oz (71.6 kg)  BMI 30.06 kg/m2        Wt Readings from Last 3 Encounters:   04/18/18 157 lb 12.8 oz (71.6 kg)   04/15/18 164 lb (74.4 kg)   02/20/17 153 lb (69.4 kg)     The patient is alert and " oriented times three. Sclerae are anicteric. Mucosal membranes are moist. Jugular venous pressure is normal. No significant adenopathy/thyromegally appreciated. Lungs are clear with good expansion. On cardiovascular exam, the patient has a regular S1 and S2. Abdomen is soft and non-tender. Extremities reveal no clubbing, cyanosis, or edema.         Imaging     Chest radiograph 15 April 2018:  1. The heart is borderline in size the thoracic aorta is ectatic the pulmonary vasculature is normal.  2. No infiltrate is seen.            Family History/Social History/Risk Factors   Patient does not smoke.  Family history of hypertension.          Medications  Allergies   Current Outpatient Prescriptions   Medication Sig Dispense Refill   ? acetaminophen (TYLENOL) 500 MG tablet Take 1,000 mg by mouth every 6 (six) hours as needed for pain.     ? allopurinol (ZYLOPRIM) 100 MG tablet Take 100 mg by mouth every other day.      ? amoxicillin (AMOXIL) 500 MG capsule daily as needed (With dental appointments).   0   ? apixaban (ELIQUIS) 5 mg Tab tablet Take 1 tablet (5 mg total) by mouth 2 (two) times a day. 30 tablet 1   ? ascorbic acid, vitamin C, (VITAMIN C) 100 MG tablet Take 100 mg by mouth daily.      ? atorvastatin (LIPITOR) 40 MG tablet Take 40 mg by mouth at bedtime.     ? calcium carbonate (OS-SARAI) 600 mg (1,500 mg) tablet Take 600 mg by mouth daily.     ? furosemide (LASIX) 40 MG tablet Take 40 mg by mouth daily.     ? losartan (COZAAR) 100 MG tablet Take 100 mg by mouth daily.     ? sotalol (BETAPACE) 80 MG tablet Take 1 tablet (80 mg total) by mouth every 12 (twelve) hours. 60 tablet 1   ? VALERIAN ORAL Take 1,030 mg by mouth at bedtime as needed (sleep).       No current facility-administered medications for this visit.       Allergies   Allergen Reactions   ? Lipitor [Atorvastatin]      numbness   ? Pravastatin      numbness   ? Iodine Rash          Lab Results   Lab Results   Component Value Date      04/15/2018    K 4.2 04/15/2018     (H) 04/15/2018    CO2 24 04/15/2018    BUN 22 04/15/2018    CREATININE 0.80 04/15/2018    CALCIUM 9.0 04/15/2018     Lab Results   Component Value Date    WBC 8.9 04/15/2018    HGB 15.2 04/15/2018    HCT 48.0 (H) 04/15/2018    MCV 91 04/15/2018     04/15/2018     Lab Results   Component Value Date    CHOL 305 (H) 02/23/2017    TRIG 264 (H) 02/23/2017    HDL 36 (L) 02/23/2017    LDLCALC 216 (H) 02/23/2017    LDLDIRECT 113 09/09/2014     Lab Results   Component Value Date    INR 0.99 04/13/2018     Lab Results   Component Value Date     (H) 04/13/2018     Lab Results   Component Value Date    TROPONINI 0.02 04/15/2018    TROPONINI 0.07 04/14/2018    TROPONINI 0.05 04/13/2018     Lab Results   Component Value Date    TSH 1.47 04/14/2018

## 2021-06-26 NOTE — PROGRESS NOTES
Progress Notes by Nasima Do CNP at 5/7/2018  1:30 PM     Author: Nasima Do CNP Service: -- Author Type: Nurse Practitioner    Filed: 5/7/2018  1:52 PM Encounter Date: 5/7/2018 Status: Signed    : Nasima Do CNP (Nurse Practitioner)           Click to link to [facility] HEART CARE NOTE      Assessment/Recommendations   Assessment:    1.  Heart failure with preserved ejection fraction: She has no symptoms of acute heart failure.  Heart failure is most likely related to atrial fibrillation with rapid ventricular response which has now resolved. We reviewed heart failure diagnosis, medications, treatment plan, low sodium diet, weight monitoring, and symptom monitoring.      2.  Paroxysmal atrial fibrillation: Heart rate is regular today.  She continues to take sotalol and Eliquis.  She states that Eliquis is too expensive so she will check with her insurance about coverage of Xarelto or Pradaxa.  She will call our clinic after talking to her insurance company.    3.  Hypertension: She states that her systolic blood pressure has been 110-130s.  She is nervous when her blood pressure is 110 systolically and states that she has mild vision changes.    Plan:  1.  She has no symptoms of fluid retention.  Vanessa is concerned about her lower blood pressures.  Decrease Lasix to 20 mg daily.  She was asked to call if she has any symptoms of acute fluid retention.  2.  Low-sodium diet and daily weights  3.  Continue [employer] Tracker  4.  She is checking the cost of Pradaxa or Xarelto to replace Eliquis due to cost.  We also discussed the possibility of warfarin and needing INRs.    Vanessa Matthew will follow up with Dr. Soliman on July 11.     History of Present Illness    Ms. Vanessa Matthew is a 77 y.o. female seen at [facility] heart failure clinic today for continued follow-up.  She was hospitalized April 13 - April 15 with chest pain,  palpitations, and dyspnea on exertion.  She was diagnosed with new atrial fibrillation with rapid ventricular response and acute heart failure with preserved ejection fraction.  She converted to sinus rhythm spontaneously.  She was started on Eliquis and sotalol.  She was seen in the emergency room on April 15 due to hypertension.  Vanessa saw Dr. Soliman on April 18 and her atenolol and amlodipine were discontinued due to hypotension.  Echocardiogram on April 13, 2018 showed an ejection fraction of 70%.  She had a normal stress test on April 30, 2018.    She is concerned about the fluctuations in her blood pressure.  She states systolic blood pressure is 110-130s.  She states that lower blood pressures causes minor vision changes.  She denies any symptoms of acute heart failure.  She denies shortness of breath, dyspnea on exertion, orthopnea, chest pain and lower extremity edema.      She is monitoring home weights which are stable between 156-157 pounds.  She is following a low sodium diet.      ECHO 4/13/18:  1.  The left ventricular cavity is somewhat small.  Left ventricular systolic performance is mildly hyperdynamic with ejection fraction of 70%.   2. There is mild concentric increase in left ventricular wall thickness.  3. No significant valvular heart disease is identified on this study.   4. Normal right ventricular size and systolic performance.   5. There is mild to moderate left atrial enlargement.      Physical Examination Review of Systems   Vitals:    05/07/18 1320   BP: 110/70   Pulse: 68   Resp: 16     Body mass index is 28.81 kg/(m^2).  Wt Readings from Last 3 Encounters:   05/07/18 155 lb (70.3 kg)   04/30/18 156 lb (70.8 kg)   04/18/18 157 lb 12.8 oz (71.6 kg)       General Appearance:     Alert, cooperative and in no acute distress.   ENT/Mouth: membranes moist, no oral lesions or bleeding gums.      EYES:  no scleral icterus, normal conjunctivae   Chest/Lungs:   lungs are clear to auscultation,  no rales or wheezing, respirations unlabored   Cardiovascular:   Regular. Normal first and second heart sounds with no murmurs, rubs, or gallops; the radial and posterior tibial pulses are intact, no edema bilateral lower extremities    Abdomen:  Soft, nontender, nondistended, bowel sounds present   Extremities: no cyanosis or clubbing   Skin: warm, dry.    Neurologic: mood and affect are appropriate, alert and oriented x3      General: WNL  Eyes: Visual Distubance  Ears/Nose/Throat: Nosebleeds  Lungs: WNL  Heart: WNL  Stomach: WNL  Bladder: WNL  Muscle/Joints: WNL  Skin: WNL  Nervous System: WNL  Mental Health: WNL     Blood: WNL     Medical History  Surgical History Family History Social History   Past Medical History:   Diagnosis Date   ? Acute CHF 4/13/2018   ? Atrial fibrillation    ? Essential hypertension    ? Essential hypertension    ? Gout    ? HLD (hyperlipidemia)    ? Primary osteoarthritis involving multiple joints     Past Surgical History:   Procedure Laterality Date   ? BREAST CYST ASPIRATION  2001   ? EYE SURGERY      bilateral cataracts with IOL   ? HYSTERECTOMY     ? OOPHORECTOMY      removed one ovary   ? REDUCTION MAMMAPLASTY Bilateral     25 yrs ago      ? TOTAL KNEE ARTHROPLASTY Right 07/01/2016    Family History   Problem Relation Age of Onset   ? Lung cancer Brother 72   ? Colon cancer Brother 53   ? Stroke Mother    ? Heart disease Father    ? No Medical Problems Sister    ? BRCA 1/2 Neg Hx    ? Breast cancer Neg Hx    ? Cancer Neg Hx    ? Endometrial cancer Neg Hx    ? Ovarian cancer Neg Hx     Social History     Social History   ? Marital status:      Spouse name: N/A   ? Number of children: N/A   ? Years of education: N/A     Occupational History   ? Printing      retired     Social History Main Topics   ? Smoking status: Never Smoker   ? Smokeless tobacco: Never Used   ? Alcohol use Yes      Comment: quit using on 4/11/18, previously 1 drink Tequila/night   ? Drug use: No   ?  Sexual activity: Not on file     Other Topics Concern   ? Not on file     Social History Narrative    [place]  Son 3 hours away .  GD in area.  7/2016            Medications  Allergies   Current Outpatient Prescriptions   Medication Sig Dispense Refill   ? acetaminophen (TYLENOL) 500 MG tablet Take 1,000 mg by mouth every 6 (six) hours as needed for pain.     ? allopurinol (ZYLOPRIM) 100 MG tablet Take 100 mg by mouth every other day.      ? amoxicillin (AMOXIL) 500 MG capsule daily as needed (With dental appointments).   0   ? apixaban (ELIQUIS) 5 mg Tab tablet Take 1 tablet (5 mg total) by mouth 2 (two) times a day. 30 tablet 1   ? calcium carbonate (OS-SARAI) 600 mg (1,500 mg) tablet Take 600 mg by mouth daily.     ? furosemide (LASIX) 40 MG tablet Take 20 mg by mouth daily.     ? losartan (COZAAR) 100 MG tablet Take 100 mg by mouth daily.     ? sotalol (BETAPACE) 80 MG tablet Take 1 tablet (80 mg total) by mouth every 12 (twelve) hours. 60 tablet 1   ? VALERIAN ORAL Take 1,030 mg by mouth at bedtime as needed (sleep).       No current facility-administered medications for this visit.       Allergies   Allergen Reactions   ? Lipitor [Atorvastatin]      numbness   ? Pravastatin      numbness   ? Iodine Rash         Lab Results    Chemistry CBC BNP   Lab Results   Component Value Date    CREATININE 1.05 04/19/2018    BUN 35 (H) 04/19/2018     04/19/2018    K 4.1 04/19/2018     04/19/2018    CO2 20 (L) 04/19/2018     Creatinine (mg/dL)   Date Value   04/19/2018 1.05   04/15/2018 0.80   04/14/2018 0.93   04/13/2018 1.22 (H)    Lab Results   Component Value Date    WBC 8.9 04/15/2018    HGB 15.2 04/15/2018    HCT 48.0 (H) 04/15/2018    MCV 91 04/15/2018     04/15/2018    Lab Results   Component Value Date     (H) 04/13/2018     BNP (pg/mL)   Date Value   04/13/2018 409 (H)            Nasima Do, [facility]   Heart Failure Clinic

## 2021-06-27 NOTE — PROGRESS NOTES
Progress Notes by Trevin Soliman MD at 4/12/2019 12:50 PM     Author: Trevin Soliman MD Service: -- Author Type: Physician    Filed: 4/12/2019  2:03 PM Encounter Date: 4/12/2019 Status: Signed    : Trevin Soliman MD (Physician)                  [email or web address].[email or web address]  [phone]         Thank you Dr. Borrego for asking the [facility] Heart Care team to participate in the care of your patient, Vanessa Matthew.     Impression and Plan     1.  Atrial fibrillation (paroxysmal).  This has been subjectively and objectively quiescent since her dismissal from the hospital in April 2018.  12-lead ECG performed in the office today confirms maintenance of sinus rhythm with reasonable QTc at 437 ms. Elysia [id number] Score is 4 yielding an annual embolic risk of 4.8-6.7%.  She has been maintained on apixaban (Eliquis ) for CVA prophylaxis. Plan:    Continue sotalol.    Continue apixaban (Eliquis ) for CVA prophylaxis.     3.  Hypertension.  Blood pressure is quite reasonable knee office today at 100/66 mmHg.     Plan on follow-up in one year.         History of Present Illness    Once again I would like to thank you again for asking me to participate in the care of your patient, Vanessa Matthew.  As you know, but to reiterate for my own records, Vanessa Matthew is a 78 y.o. female seen on 14 April 2018.  Patient was noted to have evidence of paroxysmal atrial fibrillation.  She had spontaneously converted during that hospitalization.  Patient was started on sotalol and also apixaban (Eliquis ) for CVA prophylaxis.     Since her last visit, Vanessa states she has had no subjective recurrence of the atrial fibrillation.  Patient is without cardiovascular complaints today.  She denies chest pain, shortness of breath, or any symptoms of lightheadedness.  She is pleased with how she is performing from a cardiovascular standpoint.    Further review of systems is otherwise negative/noncontributory  "(medical record and 13 point review of systems reviewed as well and pertinent positives noted).         Cardiac Diagnostics      Echocardiogram 13 April 2018 (personally reviewed):  1. The left ventricular cavity is somewhat small.  Left ventricular systolic performance is mildly hyperdynamic with ejection fraction of 70%.   2. There is mild concentric increase in left ventricular wall thickness.  3. No significant valvular heart disease is identified on this study.   4. Normal right ventricular size and systolic performance.   5. There is mild to moderate left atrial enlargement.     Nuclear perfusion imaging study 30 April 2018:  1. No evidence of infarct or ischemia.  2. Normal left ventricular systolic performance with ejection fraction of 75    12-lead ECG (personally reviewed) 11 July 2018: Sinus rhythm.   ms.     12-lead ECG (personally reviewed) 18 April 2018: Sinus rhythm.  Heart rate 54 bpm.  QTc 460 ms.     12-lead ECG (personally reviewed) 15 April 2018 at 1555: Normal sinus rhythm.  Normal ECG.     12-lead ECG (personally reviewed) 15 April 2018 at 0959: Normal sinus rhythm.  Normal ECG.     12-lead ECG (personally reviewed) 13 April 2018 at 1512:   Atrial fibrillation with heart rate of 99 bpm.  Nonspecific T-wave changes.          Physical Examination       /66 (Patient Site: Right Arm, Patient Position: Sitting, Cuff Size: Adult Regular)   Pulse 64   Resp 16   Ht 5' 1.25\" (1.556 m)   Wt 152 lb (68.9 kg)   BMI 28.49 kg/m          Wt Readings from Last 3 Encounters:   04/12/19 152 lb (68.9 kg)   03/24/19 160 lb (72.6 kg)   09/12/18 158 lb (71.7 kg)     The patient is alert and oriented times three. Sclerae are anicteric. Mucosal membranes are moist. Jugular venous pressure is normal. No significant adenopathy/thyromegally appreciated. Lungs are clear with good expansion. On cardiovascular exam, the patient has a regular S1 and S2. Abdomen is soft and non-tender. Extremities reveal no " clubbing, cyanosis, or edema.         Family History/Social History/Risk Factors   Patient does not smoke.  Family history of hypertension.         Medications  Allergies   Current Outpatient Medications   Medication Sig Dispense Refill   ? acetaminophen (TYLENOL) 500 MG tablet Take 1,000 mg by mouth every 6 (six) hours as needed for pain.     ? allopurinol (ZYLOPRIM) 100 MG tablet Take 100 mg by mouth daily.      ? amLODIPine (NORVASC) 2.5 MG tablet Take 2.5 mg by mouth daily.     ? amoxicillin (AMOXIL) 500 MG capsule daily as needed (With dental appointments).   0   ? apixaban (ELIQUIS) 5 mg Tab tablet Take 1 tablet (5 mg total) by mouth 2 (two) times a day. 60 tablet 11   ? atorvastatin (LIPITOR) 40 MG tablet Take 40 mg by mouth daily.     ? calcium carbonate (OS-SARAI) 600 mg (1,500 mg) tablet Take 600 mg by mouth daily.     ? FLUoxetine (PROZAC) 10 MG capsule Take 10 mg by mouth daily.  11   ? furosemide (LASIX) 40 MG tablet Take 20 mg by mouth daily.     ? losartan (COZAAR) 100 MG tablet Take 50 mg by mouth 2 (two) times a day. 1/2 tablet two times daily     ? sotalol (BETAPACE) 80 MG tablet Take 1 tablet (80 mg total) by mouth every 12 (twelve) hours. 60 tablet 10   ? UNABLE TO FIND Take 1 tablet by mouth 2 (two) times a day. Med Name: OTC eye vitamin       No current facility-administered medications for this visit.       Allergies   Allergen Reactions   ? Lipitor [Atorvastatin]      numbness   ? Pravastatin      numbness   ? Iodine Rash          Lab Results   Lab Results   Component Value Date     04/04/2019    K 3.9 04/04/2019     04/04/2019    CO2 23 04/04/2019    BUN 18 04/04/2019    CREATININE 0.68 04/04/2019    CALCIUM 10.2 04/04/2019     Lab Results   Component Value Date    WBC 8.8 09/12/2018    HGB 12.9 09/12/2018    HCT 39.4 09/12/2018    MCV 85 09/12/2018     09/12/2018     Lab Results   Component Value Date    CHOL 165 04/04/2019    TRIG 229 (H) 04/04/2019    HDL 43 (L)  04/04/2019    LDLCALC 76 04/04/2019    LDLDIRECT 113 09/09/2014     Lab Results   Component Value Date    INR 0.99 04/13/2018     Lab Results   Component Value Date     (H) 04/13/2018     Lab Results   Component Value Date    TROPONINI <0.01 09/12/2018    TROPONINI <0.01 08/28/2018    TROPONINI 0.02 04/15/2018     Lab Results   Component Value Date    TSH 1.86 04/19/2018

## 2021-06-28 NOTE — PROGRESS NOTES
"Progress Notes by Trevin Soliman MD at 3/27/2020 10:30 AM     Author: Trevin Soliman MD Service: -- Author Type: Physician    Filed: 3/27/2020 10:27 AM Encounter Date: 3/27/2020 Status: Signed    : Trevin Soliman MD (Physician)          The patient has been notified of following:     \"This telephone visit will be conducted via a call between you and your physician/provider. We have found that certain health care needs can be provided without the need for a physical exam.  This service lets us provide the care you need with a phone conversation.  If a prescription is necessary we can send it directly to your pharmacy.  If lab work is needed we can place an order for that and you can then stop by our lab to have the test done at a later time. If during the course of the call the physician/provider feels a telephone visit is not appropriate, you will not be charged for this service.\" Verbal consent has been obtained for this service by care team member:         HEART CARE PHONE ENCOUNTER     The patient has chosen to have the visit conducted as a telephone visit, to reduce risk of exposure given the current status of Coronavirus in our community. This telephone visit is being conducted via a call between the patient and physician/provider. Health care needs are being provided without a physical exam.      Impression and Plan     1.  Atrial fibrillation (paroxysmal).  This has been subjectively and objectively quiescent since my last visit with her.  Annas [id number] Score is 4 yielding an annual embolic risk of 4.8-6.7%.  She has been maintained on apixaban (Eliquis ) for CVA prophylaxis. Plan:    Continue sotalol.    Continue apixaban (Eliquis ) for CVA prophylaxis.     3.  Hypertension.  Patient has been monitoring her blood pressure at home and has been having favorable readings with most recent 117 mmHg systolic.     Plan on follow-up in approximately 6 months.      Total time " of call between patient and provider was 11:00 minutes     Start Time: 1008    Stop Time: 1019         History of Present Illness    Vanessa Matthew is a 79 y.o. female who is being evaluated via a billable telephone visit.     Once again I would like to thank you again for asking me to participate in the care of your patient, Vanessa Matthew.  As you know, but to reiterate for my own records, Vanessa Matthew is a 79 y.o. female with seen on 14 April 2018.  Patient was noted to have evidence of paroxysmal atrial fibrillation.  She had spontaneously converted during that hospitalization.  Patient was started on sotalol and also apixaban (Eliquis ) for CVA prophylaxis.     Since her last visit, Vanessa during our telephone visit today states she has had no subjective recurrence of the atrial fibrillation.  Patient is without cardiovascular complaints today.  She denies chest pain, shortness of breath, or any symptoms of lightheadedness.  She is pleased with how she is performing from a cardiovascular standpoint.  She denies any fevers, chills, or other constitutional symptoms.    Further review of systems is otherwise negative/noncontributory (medical record and 13 point review of systems reviewed as well and pertinent positives noted).         Cardiac Diagnostics     Echocardiogram 13 April 2018 (personally reviewed):  1. The left ventricular cavity is somewhat small.  Left ventricular systolic performance is mildly hyperdynamic with ejection fraction of 70%.   2. There is mild concentric increase in left ventricular wall thickness.  3. No significant valvular heart disease is identified on this study.   4. Normal right ventricular size and systolic performance.   5. There is mild to moderate left atrial enlargement.     Nuclear perfusion imaging study 30 April 2018:  1. No evidence of infarct or ischemia.  2. Normal left ventricular systolic performance with ejection fraction of 75    12-lead ECG (personally reviewed) 11  July 2018: Sinus rhythm.   ms.     12-lead ECG (personally reviewed) 18 April 2018: Sinus rhythm.  Heart rate 54 bpm.  QTc 460 ms.     12-lead ECG (personally reviewed) 15 April 2018 at 1555: Normal sinus rhythm.  Normal ECG.     12-lead ECG (personally reviewed) 15 April 2018 at 0959: Normal sinus rhythm.  Normal ECG.     12-lead ECG (personally reviewed) 13 April 2018 at 1512:   Atrial fibrillation with heart rate of 99 bpm.  Nonspecific T-wave changes.          Physical Examination           Wt Readings from Last 3 Encounters:   03/27/20 165 lb (74.8 kg)   04/12/19 152 lb (68.9 kg)   03/24/19 160 lb (72.6 kg)     The patient has chosen to have the visit conducted as a telephone visit, to reduce risk of exposure given the current status of Coronavirus in our community. This telephone visit is being conducted via a call between the patient and physician/provider. Health care needs are being provided without a physical exam.        Family History/Social History/Risk Factors   Patient does not smoke.  Family history reviewed, and family history includes Colon cancer (age of onset: 53) in her brother; Heart disease in her father; Lung cancer (age of onset: 72) in her brother; No Medical Problems in her sister; Stroke in her mother.        Medications  Allergies   Current Outpatient Medications   Medication Sig Dispense Refill   ? allopurinol (ZYLOPRIM) 100 MG tablet Take 100 mg by mouth daily.      ? atorvastatin (LIPITOR) 40 MG tablet Take 40 mg by mouth daily.     ? ELIQUIS 5 mg Tab tablet Take 1 tablet (5 mg total) by mouth 2 (two) times a day. 180 tablet 0   ? FLUoxetine (PROZAC) 10 MG capsule Take 10 mg by mouth daily.  11   ? furosemide (LASIX) 40 MG tablet Take 20 mg by mouth daily.     ? losartan (COZAAR) 100 MG tablet Take 50 mg by mouth 2 (two) times a day. 1/2 tablet two times daily     ? sotalol (BETAPACE) 80 MG tablet Take 1 tablet (80 mg total) by mouth every 12 (twelve) hours. 180 tablet 1   ?  UNABLE TO FIND Take 1 tablet by mouth 2 (two) times a day. Med Name: OTC eye vitamin     ? acetaminophen (TYLENOL) 500 MG tablet Take 1,000 mg by mouth every 6 (six) hours as needed for pain.     ? amLODIPine (NORVASC) 2.5 MG tablet Take 2.5 mg by mouth daily.     ? amoxicillin (AMOXIL) 500 MG capsule daily as needed (With dental appointments).   0   ? calcium carbonate (OS-SARAI) 600 mg (1,500 mg) tablet Take 600 mg by mouth daily.       No current facility-administered medications for this visit.       Allergies   Allergen Reactions   ? Lipitor [Atorvastatin]      numbness   ? Pravastatin      numbness   ? Iodine Rash          Lab Results   Lab Results   Component Value Date     09/11/2019    K 4.2 09/11/2019     09/11/2019    CO2 27 09/11/2019    BUN 22 09/11/2019    CREATININE 0.71 09/11/2019    CALCIUM 10.0 09/11/2019     Lab Results   Component Value Date    WBC 8.8 09/12/2018    HGB 12.9 09/12/2018    HCT 39.4 09/12/2018    MCV 85 09/12/2018     09/12/2018     Lab Results   Component Value Date    CHOL 165 04/04/2019    TRIG 229 (H) 04/04/2019    HDL 43 (L) 04/04/2019    LDLCALC 76 04/04/2019    LDLDIRECT 113 09/09/2014     Lab Results   Component Value Date    INR 0.99 04/13/2018     Lab Results   Component Value Date     (H) 04/13/2018     Lab Results   Component Value Date    TROPONINI <0.01 09/12/2018    TROPONINI <0.01 08/28/2018    TROPONINI 0.02 04/15/2018     Lab Results   Component Value Date    TSH 1.86 04/19/2018           Medical History  Surgical History   Past Medical History:   Diagnosis Date   ? Acute CHF (H) 4/13/2018   ? Atrial fibrillation (H)    ? Essential hypertension    ? Essential hypertension    ? Gout    ? HLD (hyperlipidemia)    ? Primary osteoarthritis involving multiple joints       Past Surgical History:   Procedure Laterality Date   ? BREAST CYST ASPIRATION  2001   ? EYE SURGERY      bilateral cataracts with IOL   ? HYSTERECTOMY     ? OOPHORECTOMY       removed one ovary   ? REDUCTION MAMMAPLASTY Bilateral     25 yrs ago      ? TOTAL KNEE ARTHROPLASTY Right 07/01/2016

## 2021-06-30 NOTE — PROGRESS NOTES
Progress Notes by Trevin Soliman MD at 4/14/2021 10:10 AM     Author: Trevin Soliman MD Service: -- Author Type: Physician    Filed: 4/14/2021 10:35 AM Encounter Date: 4/14/2021 Status: Signed    : Trevin Soliman MD (Physician)                                       Thank you Dr. Pearce for asking the [facility] Heart Care team to participate in the care of your patient, Vanessa Matthew.     Impression and Plan     1.  Atrial fibrillation (paroxysmal).  This has been subjectively and objectively quiescent since my last visit with her.  Elysia [id number] Score is 4 yielding an annual embolic risk of 4.8-6.7%.  She has been maintained on apixaban (Eliquis ) for CVA prophylaxis. Plan:    Continue sotalol.    Continue apixaban (Eliquis ) for CVA prophylaxis.    3.  Hypertension.  At last visit, Vanessa's blood pressure was somewhat elevated.  We increased her amlodipine at that time.  Blood pressure is improved and quite reasonable in the office today at 120/78 mmHg.  In addition, 2 weeks ago she had a dental appointment and her blood pressure at that time was 124/70..  Plan:    Continue amlodipine 5 mg daily.     Continue sotalol as per problem #1.     Plan on follow-up in approximately 1 year.       20 minutes spent reviewing prior records (including documentation, laboratory studies, cardiac testing/imaging), interview with patient along with physical exam, planning, and subsequent documentation/crafting of note.         History of Present Illness    Once again I would like to thank you again for asking me to participate in the care of your patient, Vanessa Matthew.  As you know, but to reiterate for my own records, Vanessa Matthew is a 80 y.o. female seen on 14 April 2018.  Patient was noted to have evidence of paroxysmal atrial fibrillation.  She had spontaneously converted during that hospitalization.  Patient was started on sotalol and also apixaban (Eliquis ) for CVA prophylaxis.    "  Since her last visit, Vanessa has had no subjective recurrence of the atrial fibrillation.  Patient is without cardiovascular complaints today.  She denies chest pain, shortness of breath, or any symptoms of lightheadedness.  She is pleased with how she is performing from a cardiovascular standpoint.  She denies any fevers, chills, or other constitutional symptoms.     Further review of systems is otherwise negative/noncontributory (medical record and 13 point review of systems reviewed as well and pertinent positives noted).         Cardiac Diagnostics      Echocardiogram 13 April 2018 (personally reviewed):  1. The left ventricular cavity is somewhat small.  Left ventricular systolic performance is mildly hyperdynamic with ejection fraction of 70%.   2. There is mild concentric increase in left ventricular wall thickness.  3. No significant valvular heart disease is identified on this study.   4. Normal right ventricular size and systolic performance.   5. There is mild to moderate left atrial enlargement.     Nuclear perfusion imaging study 30 April 2018:  1. No evidence of infarct or ischemia.  2. Normal left ventricular systolic performance with ejection fraction of 75     12-lead ECG (personally reviewed) 11 July 2018: Sinus rhythm.   ms.     12-lead ECG (personally reviewed) 18 April 2018: Sinus rhythm.  Heart rate 54 bpm.  QTc 460 ms.     12-lead ECG (personally reviewed) 15 April 2018 at 1555: Normal sinus rhythm.  Normal ECG.     12-lead ECG (personally reviewed) 15 April 2018 at 0959: Normal sinus rhythm.  Normal ECG.     12-lead ECG (personally reviewed) 13 April 2018 at 1512:   Atrial fibrillation with heart rate of 99 bpm.  Nonspecific T-wave changes.         Physical Examination       /78 (Patient Site: Left Arm, Patient Position: Sitting, Cuff Size: Adult Large)   Pulse 76   Resp 14   Ht 5' 1.25\" (1.556 m)   Wt 167 lb (75.8 kg)   BMI 31.30 kg/m          Wt Readings from Last 3 " Encounters:   04/14/21 167 lb (75.8 kg)   02/26/21 162 lb (73.5 kg)   03/27/20 165 lb (74.8 kg)       The patient is alert and oriented times three. Sclerae are anicteric. Mucosal membranes are moist. Jugular venous pressure is normal. No significant adenopathy/thyromegally appreciated. Lungs are clear with good expansion. On cardiovascular exam, the patient has a regular S1 and S2. Abdomen is soft and non-tender. Extremities reveal no clubbing, cyanosis, or edema.         Family History/Social History/Risk Factors   Patient does not smoke.  Family history reviewed, and family history includes Colon cancer (age of onset: 53) in her brother; Heart disease in her father; Lung cancer (age of onset: 72) in her brother; No Medical Problems in her sister; Stroke in her mother.          Medications  Allergies   Current Outpatient Medications   Medication Sig Dispense Refill   ? acetaminophen (TYLENOL) 500 MG tablet Take 1,000 mg by mouth every 6 (six) hours as needed for pain.     ? allopurinol (ZYLOPRIM) 100 MG tablet Take 100 mg by mouth daily.      ? amLODIPine (NORVASC) 5 MG tablet Take 1 tablet (5 mg total) by mouth daily. 90 tablet 3   ? atorvastatin (LIPITOR) 40 MG tablet Take 40 mg by mouth daily.     ? calcium carbonate (OS-SARAI) 600 mg (1,500 mg) tablet Take 600 mg by mouth daily.     ? ELIQUIS 5 mg Tab tablet Take 1 tablet (5 mg total) by mouth 2 (two) times a day. 180 tablet 3   ? FLUoxetine (PROZAC) 10 MG capsule Take 10 mg by mouth daily.  11   ? furosemide (LASIX) 40 MG tablet Take 20 mg by mouth daily.     ? losartan (COZAAR) 100 MG tablet Take 100 mg by mouth daily.      ? sotaloL (BETAPACE) 80 MG tablet Take 1 tablet (80 mg total) by mouth every 12 (twelve) hours. 180 tablet 0   ? UNABLE TO FIND Take 1 tablet by mouth 2 (two) times a day. Med Name: OTC eye vitamin     ? amoxicillin (AMOXIL) 500 MG capsule daily as needed (With dental appointments).   0     No current facility-administered medications for  this visit.       Allergies   Allergen Reactions   ? Lipitor [Atorvastatin]      numbness   ? Pravastatin      numbness   ? Iodine Rash          Lab Results   Lab Results   Component Value Date     05/21/2020    K 4.2 05/21/2020     05/21/2020    CO2 26 05/21/2020    BUN 23 05/21/2020    CREATININE 0.72 05/21/2020    CALCIUM 9.4 05/21/2020     Lab Results   Component Value Date    WBC 8.8 09/12/2018    HGB 12.9 09/12/2018    HCT 39.4 09/12/2018    MCV 85 09/12/2018     09/12/2018     Lab Results   Component Value Date    CHOL 176 05/21/2020    TRIG 189 (H) 05/21/2020    HDL 41 (L) 05/21/2020    LDLCALC 97 05/21/2020    LDLDIRECT 113 09/09/2014     Lab Results   Component Value Date    INR 0.99 04/13/2018     Lab Results   Component Value Date     (H) 04/13/2018     Lab Results   Component Value Date    TROPONINI <0.01 09/12/2018    TROPONINI <0.01 08/28/2018    TROPONINI 0.02 04/15/2018     Lab Results   Component Value Date    TSH 1.86 04/19/2018

## 2021-06-30 NOTE — PROGRESS NOTES
Progress Notes by Trevin Soliman MD at 2/26/2021  3:10 PM     Author: Trevin Soliman MD Service: -- Author Type: Physician    Filed: 2/26/2021  3:28 PM Encounter Date: 2/26/2021 Status: Signed    : Trevin Soliman MD (Physician)                                       Thank you Dr. Borrego for asking the [facility] team to participate in the care of your patient, Vanessa Matthew.     Impression and Plan     1.  Atrial fibrillation (paroxysmal).  This has been subjectively and objectively quiescent since my last visit with her.  Elysia [id number] Score is 4 yielding an annual embolic risk of 4.8-6.7%.  She has been maintained on apixaban (Eliquis ) for CVA prophylaxis. Plan:    Continue sotalol.    Continue apixaban (Eliquis ) for CVA prophylaxis.     3.  Hypertension.  Pressure is somewhat elevated in the office today.  She has had some other tendency toward higher readings.  Plan:    Increase amlodipine from 2.5 mg daily to 5 mg daily.        Plan on follow-up in approximately 2 months primarily to reassess blood pressure.    30 minutes spent reviewing prior records (including documentation, laboratory studies, cardiac testing/imaging), interview with patient along with physical exam, planning, and subsequent documentation/crafting of note.           History of Present Illness    Once again I would like to thank you again for asking me to participate in the care of your patient, Vanessa Matthew.  As you know, but to reiterate for my own records, Vanessa Matthew is a 80 y.o. female with seen on 14 April 2018.  Patient was noted to have evidence of paroxysmal atrial fibrillation.  She had spontaneously converted during that hospitalization.  Patient was started on sotalol and also apixaban (Eliquis ) for CVA prophylaxis.     Since her last visit, Vanessa has had no subjective recurrence of the atrial fibrillation.  Patient is without cardiovascular complaints today.  She denies  chest pain, shortness of breath, or any symptoms of lightheadedness.  She is pleased with how she is performing from a cardiovascular standpoint.  She denies any fevers, chills, or other constitutional symptoms.    Further review of systems is otherwise negative/noncontributory (medical record and 13 point review of systems reviewed as well and pertinent positives noted).         Cardiac Diagnostics      Echocardiogram 13 April 2018 (personally reviewed):  1. The left ventricular cavity is somewhat small.  Left ventricular systolic performance is mildly hyperdynamic with ejection fraction of 70%.   2. There is mild concentric increase in left ventricular wall thickness.  3. No significant valvular heart disease is identified on this study.   4. Normal right ventricular size and systolic performance.   5. There is mild to moderate left atrial enlargement.     Nuclear perfusion imaging study 30 April 2018:  1. No evidence of infarct or ischemia.  2. Normal left ventricular systolic performance with ejection fraction of 75     12-lead ECG (personally reviewed) 11 July 2018: Sinus rhythm.   ms.     12-lead ECG (personally reviewed) 18 April 2018: Sinus rhythm.  Heart rate 54 bpm.  QTc 460 ms.     12-lead ECG (personally reviewed) 15 April 2018 at 1555: Normal sinus rhythm.  Normal ECG.     12-lead ECG (personally reviewed) 15 April 2018 at 0959: Normal sinus rhythm.  Normal ECG.     12-lead ECG (personally reviewed) 13 April 2018 at 1512:   Atrial fibrillation with heart rate of 99 bpm.  Nonspecific T-wave changes.            Physical Examination       /90 (Patient Site: Left Arm, Patient Position: Sitting, Cuff Size: Adult Regular)   Pulse 72   Resp 16   Wt 162 lb (73.5 kg)   SpO2 100%   BMI 30.36 kg/m          Wt Readings from Last 3 Encounters:   02/26/21 162 lb (73.5 kg)   03/27/20 165 lb (74.8 kg)   04/12/19 152 lb (68.9 kg)     The patient is alert and oriented times three. Sclerae are anicteric.  Mucosal membranes are moist. Jugular venous pressure is normal. No significant adenopathy/thyromegally appreciated. Lungs are clear with good expansion. On cardiovascular exam, the patient has a regular S1 and S2. Abdomen is soft and non-tender. Extremities reveal no clubbing, cyanosis, or edema.       Family History/Social History/Risk Factors   Patient does not smoke.  Family history reviewed, and family history includes Colon cancer (age of onset: 53) in her brother; Heart disease in her father; Lung cancer (age of onset: 72) in her brother; No Medical Problems in her sister; Stroke in her mother.          Medications  Allergies   Current Outpatient Medications   Medication Sig Dispense Refill   ? acetaminophen (TYLENOL) 500 MG tablet Take 1,000 mg by mouth every 6 (six) hours as needed for pain.     ? allopurinol (ZYLOPRIM) 100 MG tablet Take 100 mg by mouth daily.      ? atorvastatin (LIPITOR) 40 MG tablet Take 40 mg by mouth daily.     ? calcium carbonate (OS-SARAI) 600 mg (1,500 mg) tablet Take 600 mg by mouth daily.     ? ELIQUIS 5 mg Tab tablet Take 1 tablet (5 mg total) by mouth 2 (two) times a day. 180 tablet 3   ? FLUoxetine (PROZAC) 10 MG capsule Take 10 mg by mouth daily.  11   ? furosemide (LASIX) 40 MG tablet Take 20 mg by mouth daily.     ? losartan (COZAAR) 100 MG tablet Take 100 mg by mouth daily.      ? sotaloL (BETAPACE) 80 MG tablet Take 1 tablet (80 mg total) by mouth every 12 (twelve) hours. 180 tablet 0   ? UNABLE TO FIND Take 1 tablet by mouth 2 (two) times a day. Med Name: OTC eye vitamin     ? amLODIPine (NORVASC) 5 MG tablet Take 1 tablet (5 mg total) by mouth daily. 90 tablet 3   ? amoxicillin (AMOXIL) 500 MG capsule daily as needed (With dental appointments).   0     No current facility-administered medications for this visit.       Allergies   Allergen Reactions   ? Lipitor [Atorvastatin]      numbness   ? Pravastatin      numbness   ? Iodine Rash          Lab Results   Lab Results    Component Value Date     05/21/2020    K 4.2 05/21/2020     05/21/2020    CO2 26 05/21/2020    BUN 23 05/21/2020    CREATININE 0.72 05/21/2020    CALCIUM 9.4 05/21/2020     Lab Results   Component Value Date    WBC 8.8 09/12/2018    HGB 12.9 09/12/2018    HCT 39.4 09/12/2018    MCV 85 09/12/2018     09/12/2018     Lab Results   Component Value Date    CHOL 176 05/21/2020    TRIG 189 (H) 05/21/2020    HDL 41 (L) 05/21/2020    LDLCALC 97 05/21/2020    LDLDIRECT 113 09/09/2014     Lab Results   Component Value Date    INR 0.99 04/13/2018     Lab Results   Component Value Date     (H) 04/13/2018     Lab Results   Component Value Date    TROPONINI <0.01 09/12/2018    TROPONINI <0.01 08/28/2018    TROPONINI 0.02 04/15/2018     Lab Results   Component Value Date    TSH 1.86 04/19/2018

## 2021-07-13 ENCOUNTER — RECORDS - HEALTHEAST (OUTPATIENT)
Dept: ADMINISTRATIVE | Facility: CLINIC | Age: 81
End: 2021-07-13

## 2021-07-14 PROBLEM — I50.9 ACUTE CHF (H): Chronic | Status: RESOLVED | Noted: 2018-04-13 | Resolved: 2018-05-07

## 2021-07-15 DIAGNOSIS — I48.0 PAROXYSMAL ATRIAL FIBRILLATION (H): ICD-10-CM

## 2021-07-21 ENCOUNTER — RECORDS - HEALTHEAST (OUTPATIENT)
Dept: ADMINISTRATIVE | Facility: CLINIC | Age: 81
End: 2021-07-21

## 2021-07-21 DIAGNOSIS — I48.0 PAROXYSMAL ATRIAL FIBRILLATION (H): ICD-10-CM

## 2021-07-21 RX ORDER — SOTALOL HYDROCHLORIDE 80 MG/1
80 TABLET ORAL EVERY 12 HOURS
Qty: 180 TABLET | Refills: 1 | Status: SHIPPED | OUTPATIENT
Start: 2021-07-21 | End: 2022-01-24

## 2021-08-25 DIAGNOSIS — I48.0 PAROXYSMAL ATRIAL FIBRILLATION (H): ICD-10-CM

## 2021-10-16 ENCOUNTER — HEALTH MAINTENANCE LETTER (OUTPATIENT)
Age: 81
End: 2021-10-16

## 2022-02-16 DIAGNOSIS — I48.0 PAROXYSMAL ATRIAL FIBRILLATION (H): ICD-10-CM

## 2022-06-02 ENCOUNTER — OFFICE VISIT (OUTPATIENT)
Dept: CARDIOLOGY | Facility: CLINIC | Age: 82
End: 2022-06-02
Payer: COMMERCIAL

## 2022-06-02 VITALS
SYSTOLIC BLOOD PRESSURE: 148 MMHG | WEIGHT: 165 LBS | DIASTOLIC BLOOD PRESSURE: 80 MMHG | BODY MASS INDEX: 29.23 KG/M2 | HEART RATE: 69 BPM | RESPIRATION RATE: 16 BRPM | HEIGHT: 63 IN

## 2022-06-02 DIAGNOSIS — I10 HYPERTENSION, UNSPECIFIED TYPE: ICD-10-CM

## 2022-06-02 DIAGNOSIS — I48.0 PAROXYSMAL ATRIAL FIBRILLATION (H): Primary | ICD-10-CM

## 2022-06-02 LAB
ATRIAL RATE - MUSE: 68 BPM
DIASTOLIC BLOOD PRESSURE - MUSE: NORMAL MMHG
INTERPRETATION ECG - MUSE: NORMAL
P AXIS - MUSE: 30 DEGREES
PR INTERVAL - MUSE: 162 MS
QRS DURATION - MUSE: 82 MS
QT - MUSE: 434 MS
QTC - MUSE: 461 MS
R AXIS - MUSE: 36 DEGREES
SYSTOLIC BLOOD PRESSURE - MUSE: NORMAL MMHG
T AXIS - MUSE: 45 DEGREES
VENTRICULAR RATE- MUSE: 68 BPM

## 2022-06-02 PROCEDURE — 93000 ELECTROCARDIOGRAM COMPLETE: CPT | Performed by: GENERAL ACUTE CARE HOSPITAL

## 2022-06-02 PROCEDURE — 99214 OFFICE O/P EST MOD 30 MIN: CPT | Performed by: INTERNAL MEDICINE

## 2022-06-02 RX ORDER — AMLODIPINE BESYLATE 10 MG/1
10 TABLET ORAL DAILY
Qty: 90 TABLET | Refills: 3 | Status: SHIPPED | OUTPATIENT
Start: 2022-06-02 | End: 2023-06-21

## 2022-06-02 NOTE — PROGRESS NOTES
[facility]   [address], [place] [address]   [email or web address]   OFFICE: [phone]          Thank you Mart Shipley for asking the [facility] team to participate in the care of your patient, Vanessa Matthew.     Impression and Plan     1.  Atrial fibrillation (paroxysmal).  This has been subjectively and objectively quiescent since my last visit with her.  Vanessa's [id number] Score is 4 yielding an annual embolic risk of 4.8-6.7%.  She has been maintained on apixaban (Eliquis ) for CVA prophylaxis.  QTc on today's ECG is favorable.  Plan:    Continue sotalol.    Continue apixaban (Eliquis ) for CVA prophylaxis.     3.  Hypertension.  Blood pressure is somewhat elevated in the office today.  Plan:    Continue amlodipine, but increase from 5 mg daily to 10 mg daily.     Continue sotalol as per problem #1.     Plan on follow-up in approximately 1 year.      35 minutes spent reviewing prior records (including documentation, laboratory studies, cardiac testing/imaging), interview with patient along with physical exam, planning, and subsequent documentation/crafting of note).           History of Present Illness    Once again I would like to thank you again for asking me to participate in the care of your patient, Vanessa Matthew.  As you know, but to reiterate for my own records, Vanessa Matthew is a 81 year old female seen on 14 April 2018.    Vanessa was noted to have evidence of paroxysmal atrial fibrillation.  She had spontaneously converted during that hospitalization.  She was started on sotalol and also apixaban (Eliquis ) for CVA prophylaxis.     Since her last visit, Vanessa has had no subjective recurrence of the atrial fibrillation.  Patient is without cardiovascular complaints today.  She denies chest pain, shortness of breath, or any symptoms of lightheadedness.  She is pleased with how she is performing from a cardiovascular  "standpoint.  She denies any fevers, chills, or other constitutional symptoms.    Further review of systems is otherwise negative/noncontributory (medical record and 13 point review of systems reviewed as well and pertinent positives noted).         Cardiac Diagnostics      Echocardiogram 13 April 2018 (personally reviewed):  1. The left ventricular cavity is somewhat small.  Left ventricular systolic performance is mildly hyperdynamic with ejection fraction of 70%.   2. There is mild concentric increase in left ventricular wall thickness.  3. No significant valvular heart disease is identified on this study.   4. Normal right ventricular size and systolic performance.   5. There is mild to moderate left atrial enlargement.     Nuclear perfusion imaging study 30 April 2018:  1. No evidence of infarct or ischemia.  2. Normal left ventricular systolic performance with ejection fraction of 75     12-lead ECG (personally reviewed) 2 June 2022: Sinus rhythm.   ms.    12-lead ECG (personally reviewed) 11 July 2018: Sinus rhythm.   ms.     12-lead ECG (personally reviewed) 18 April 2018: Sinus rhythm.  Heart rate 54 bpm.  QTc 460 ms.     12-lead ECG (personally reviewed) 15 April 2018 at 1555: Normal sinus rhythm.  Normal ECG.     12-lead ECG (personally reviewed) 15 April 2018 at 0959: Normal sinus rhythm.  Normal ECG.     12-lead ECG (personally reviewed) 13 April 2018 at 1512:   Atrial fibrillation with heart rate of 99 bpm.  Nonspecific T-wave changes.            Physical Examination       BP (!) 148/80 (BP Location: Left arm, Patient Position: Sitting, Cuff Size: Adult Small)   Pulse 69   Resp 16   Ht 1.588 m (5' 2.5\")   Wt 74.8 kg (165 lb)   BMI 29.70 kg/m          Wt Readings from Last 3 Encounters:   06/02/22 74.8 kg (165 lb)   04/14/21 75.8 kg (167 lb)   02/26/21 73.5 kg (162 lb)     The patient is alert and oriented times three. Sclerae are anicteric. Mucosal membranes are moist. Jugular venous " pressure is normal. No significant adenopathy/thyromegally appreciated. Lungs are clear with good expansion. On cardiovascular exam, the patient has a regular S1 and S2. Abdomen is soft and non-tender. Extremities reveal no clubbing, cyanosis, or edema.         Medications  Allergies   Current Outpatient Medications   Medication Sig Dispense Refill     acetaminophen (TYLENOL) 500 MG tablet [ACETAMINOPHEN (TYLENOL) 500 MG TABLET] Take 1,000 mg by mouth every 6 (six) hours as needed for pain.       allopurinol (ZYLOPRIM) 100 MG tablet [ALLOPURINOL (ZYLOPRIM) 100 MG TABLET] Take 100 mg by mouth daily.        amLODIPine (NORVASC) 10 MG tablet Take 1 tablet (10 mg) by mouth daily 90 tablet 3     amoxicillin (AMOXIL) 500 MG capsule [AMOXICILLIN (AMOXIL) 500 MG CAPSULE] daily as needed (With dental appointments).   0     atorvastatin (LIPITOR) 40 MG tablet [ATORVASTATIN (LIPITOR) 40 MG TABLET] Take 40 mg by mouth daily.       calcium carbonate (OS-SARAI) 600 mg (1,500 mg) tablet [CALCIUM CARBONATE (OS-SARAI) 600 MG (1,500 MG) TABLET] Take 600 mg by mouth daily.       ELIQUIS ANTICOAGULANT 5 MG tablet Take 1 tablet (5 mg) by mouth 2 times daily 180 tablet 0     FLUoxetine (PROZAC) 10 MG capsule [FLUOXETINE (PROZAC) 10 MG CAPSULE] Take 10 mg by mouth daily.  11     furosemide (LASIX) 40 MG tablet [FUROSEMIDE (LASIX) 40 MG TABLET] Take 20 mg by mouth daily.       losartan (COZAAR) 100 MG tablet [LOSARTAN (COZAAR) 100 MG TABLET] Take 100 mg by mouth daily.        sotalol (BETAPACE) 80 MG tablet Take 1 tablet (80 mg) by mouth every 12 hours 180 tablet 0     UNABLE TO FIND [UNABLE TO FIND] Take 1 tablet by mouth 2 (two) times a day. Med Name: OTC eye vitamin         Allergies   Allergen Reactions     Atorvastatin Unknown     numbness     Pravastatin Unknown     numbness     Iodine Rash          Lab Results    Chemistry/lipid CBC Cardiac Enzymes/BNP/TSH/INR   Recent Labs   Lab Test 05/19/21  1156   CHOL 179   HDL 40*   LDL 96   TRIG  215*     Recent Labs   Lab Test 05/19/21  1156 05/21/20  1000 04/04/19  1627   LDL 96 97 76     Recent Labs   Lab Test 05/19/21  1156      POTASSIUM 3.4*   CHLORIDE 102   CO2 28   GLC 88   BUN 18   CR 0.72   GFRESTIMATED >60   SARAI 9.0     Recent Labs   Lab Test 05/19/21  1156 05/21/20  1000 09/11/19  1112   CR 0.72 0.72 0.71     No results for input(s): A1C in the last 77120 hours.       Recent Labs   Lab Test 09/12/18  2343   WBC 8.8   HGB 12.9   HCT 39.4   MCV 85        Recent Labs   Lab Test 09/12/18  2343 08/28/18  2001 04/15/18  1742   HGB 12.9 13.6 15.2    Recent Labs   Lab Test 09/12/18  2343 08/28/18  2001 04/15/18  1742   TROPONINI <0.01 <0.01 0.02     Recent Labs   Lab Test 04/13/18  1504   *     Recent Labs   Lab Test 04/19/18  0853   TSH 1.86     Recent Labs   Lab Test 04/13/18  1504   INR 0.99        Medical History  Surgical History Family History Social History   Past Medical History:   Diagnosis Date     Acute CHF (H) 4/13/2018     Atrial fibrillation (H)      Essential hypertension      Essential hypertension      Gout      HLD (hyperlipidemia)      Primary osteoarthritis involving multiple joints      Past Surgical History:   Procedure Laterality Date     BREAST CYST ASPIRATION  2001     EYE SURGERY      bilateral cataracts with IOL     HYSTERECTOMY       MAMMOPLASTY REDUCTION Bilateral     25 yrs ago        OOPHORECTOMY      removed one ovary     TOTAL KNEE ARTHROPLASTY Right 07/01/2016     Family History   Problem Relation Age of Onset     Lung Cancer Brother 72.00     Colon Cancer Brother 53.00     Cerebrovascular Disease Mother      Heart Disease Father      No Known Problems Sister      Hereditary Breast and Ovarian Cancer Syndrome No family hx of      Breast Cancer No family hx of      Cancer No family hx of      Endometrial Cancer No family hx of      Ovarian Cancer No family hx of         Social History     Socioeconomic History     Marital status:      Spouse  name: Not on file     Number of children: Not on file     Years of education: Not on file     Highest education level: Not on file   Occupational History     Not on file   Tobacco Use     Smoking status: Never Smoker     Smokeless tobacco: Never Used   Substance and Sexual Activity     Alcohol use: Yes     Comment: Alcoholic Drinks/day: quit using on 4/11/18, previously 1 drink Tequila/night     Drug use: No     Sexual activity: Not on file   Other Topics Concern     Not on file   Social History Narrative    [place]  Son 3 hours away .  GD in area.  7/2016       Social Determinants of Health     Financial Resource Strain: Not on file   Food Insecurity: Not on file   Transportation Needs: Not on file   Physical Activity: Not on file   Stress: Not on file   Social Connections: Not on file   Intimate Partner Violence: Not on file   Housing Stability: Not on file

## 2022-06-02 NOTE — LETTER
6/2/2022    Historical Provider  No address on file    RE: Vanessa Matthew       Dear Colleague,     I had the pleasure of seeing Vanessa Matthew in the [facility].         [facility] [address], [place] [address]   [email or web address]   OFFICE: [phone]          Thank you Dr. Antonio, Mart for asking the [facility] team to participate in the care of your patient, Vanessa Matthew.     Impression and Plan     1.  Atrial fibrillation (paroxysmal).  This has been subjectively and objectively quiescent since my last visit with her.  Vanessa's [id number] Score is 4 yielding an annual embolic risk of 4.8-6.7%.  She has been maintained on apixaban (Eliquis ) for CVA prophylaxis.  QTc on today's ECG is favorable.  Plan:    Continue sotalol.    Continue apixaban (Eliquis ) for CVA prophylaxis.     3.  Hypertension.  Blood pressure is somewhat elevated in the office today.  Plan:    Continue amlodipine, but increase from 5 mg daily to 10 mg daily.     Continue sotalol as per problem #1.     Plan on follow-up in approximately 1 year.      35 minutes spent reviewing prior records (including documentation, laboratory studies, cardiac testing/imaging), interview with patient along with physical exam, planning, and subsequent documentation/crafting of note).           History of Present Illness    Once again I would like to thank you again for asking me to participate in the care of your patient, Vanessa Matthew.  As you know, but to reiterate for my own records, Vanessa Matthew is a 81 year old female seen on 14 April 2018.    Vanessa was noted to have evidence of paroxysmal atrial fibrillation.  She had spontaneously converted during that hospitalization.  She was started on sotalol and also apixaban (Eliquis ) for CVA prophylaxis.     Since her last visit, Vanessa has had no subjective recurrence of the atrial fibrillation.  Patient is without  "cardiovascular complaints today.  She denies chest pain, shortness of breath, or any symptoms of lightheadedness.  She is pleased with how she is performing from a cardiovascular standpoint.  She denies any fevers, chills, or other constitutional symptoms.    Further review of systems is otherwise negative/noncontributory (medical record and 13 point review of systems reviewed as well and pertinent positives noted).         Cardiac Diagnostics      Echocardiogram 13 April 2018 (personally reviewed):  1. The left ventricular cavity is somewhat small.  Left ventricular systolic performance is mildly hyperdynamic with ejection fraction of 70%.   2. There is mild concentric increase in left ventricular wall thickness.  3. No significant valvular heart disease is identified on this study.   4. Normal right ventricular size and systolic performance.   5. There is mild to moderate left atrial enlargement.     Nuclear perfusion imaging study 30 April 2018:  1. No evidence of infarct or ischemia.  2. Normal left ventricular systolic performance with ejection fraction of 75     12-lead ECG (personally reviewed) 2 June 2022: Sinus rhythm.   ms.    12-lead ECG (personally reviewed) 11 July 2018: Sinus rhythm.   ms.     12-lead ECG (personally reviewed) 18 April 2018: Sinus rhythm.  Heart rate 54 bpm.  QTc 460 ms.     12-lead ECG (personally reviewed) 15 April 2018 at 1555: Normal sinus rhythm.  Normal ECG.     12-lead ECG (personally reviewed) 15 April 2018 at 0959: Normal sinus rhythm.  Normal ECG.     12-lead ECG (personally reviewed) 13 April 2018 at 1512:   Atrial fibrillation with heart rate of 99 bpm.  Nonspecific T-wave changes.            Physical Examination       BP (!) 148/80 (BP Location: Left arm, Patient Position: Sitting, Cuff Size: Adult Small)   Pulse 69   Resp 16   Ht 1.588 m (5' 2.5\")   Wt 74.8 kg (165 lb)   BMI 29.70 kg/m          Wt Readings from Last 3 Encounters:   06/02/22 74.8 kg (165 " lb)   04/14/21 75.8 kg (167 lb)   02/26/21 73.5 kg (162 lb)     The patient is alert and oriented times three. Sclerae are anicteric. Mucosal membranes are moist. Jugular venous pressure is normal. No significant adenopathy/thyromegally appreciated. Lungs are clear with good expansion. On cardiovascular exam, the patient has a regular S1 and S2. Abdomen is soft and non-tender. Extremities reveal no clubbing, cyanosis, or edema.         Medications  Allergies   Current Outpatient Medications   Medication Sig Dispense Refill     acetaminophen (TYLENOL) 500 MG tablet [ACETAMINOPHEN (TYLENOL) 500 MG TABLET] Take 1,000 mg by mouth every 6 (six) hours as needed for pain.       allopurinol (ZYLOPRIM) 100 MG tablet [ALLOPURINOL (ZYLOPRIM) 100 MG TABLET] Take 100 mg by mouth daily.        amLODIPine (NORVASC) 10 MG tablet Take 1 tablet (10 mg) by mouth daily 90 tablet 3     amoxicillin (AMOXIL) 500 MG capsule [AMOXICILLIN (AMOXIL) 500 MG CAPSULE] daily as needed (With dental appointments).   0     atorvastatin (LIPITOR) 40 MG tablet [ATORVASTATIN (LIPITOR) 40 MG TABLET] Take 40 mg by mouth daily.       calcium carbonate (OS-SARAI) 600 mg (1,500 mg) tablet [CALCIUM CARBONATE (OS-SARAI) 600 MG (1,500 MG) TABLET] Take 600 mg by mouth daily.       ELIQUIS ANTICOAGULANT 5 MG tablet Take 1 tablet (5 mg) by mouth 2 times daily 180 tablet 0     FLUoxetine (PROZAC) 10 MG capsule [FLUOXETINE (PROZAC) 10 MG CAPSULE] Take 10 mg by mouth daily.  11     furosemide (LASIX) 40 MG tablet [FUROSEMIDE (LASIX) 40 MG TABLET] Take 20 mg by mouth daily.       losartan (COZAAR) 100 MG tablet [LOSARTAN (COZAAR) 100 MG TABLET] Take 100 mg by mouth daily.        sotalol (BETAPACE) 80 MG tablet Take 1 tablet (80 mg) by mouth every 12 hours 180 tablet 0     UNABLE TO FIND [UNABLE TO FIND] Take 1 tablet by mouth 2 (two) times a day. Med Name: OTC eye vitamin         Allergies   Allergen Reactions     Atorvastatin Unknown     numbness     Pravastatin Unknown      numbness     Iodine Rash          Lab Results    Chemistry/lipid CBC Cardiac Enzymes/BNP/TSH/INR   Recent Labs   Lab Test 05/19/21  1156   CHOL 179   HDL 40*   LDL 96   TRIG 215*     Recent Labs   Lab Test 05/19/21  1156 05/21/20  1000 04/04/19  1627   LDL 96 97 76     Recent Labs   Lab Test 05/19/21  1156      POTASSIUM 3.4*   CHLORIDE 102   CO2 28   GLC 88   BUN 18   CR 0.72   GFRESTIMATED >60   SARAI 9.0     Recent Labs   Lab Test 05/19/21  1156 05/21/20  1000 09/11/19  1112   CR 0.72 0.72 0.71     No results for input(s): A1C in the last 12160 hours.       Recent Labs   Lab Test 09/12/18  2343   WBC 8.8   HGB 12.9   HCT 39.4   MCV 85        Recent Labs   Lab Test 09/12/18  2343 08/28/18  2001 04/15/18  1742   HGB 12.9 13.6 15.2    Recent Labs   Lab Test 09/12/18  2343 08/28/18  2001 04/15/18  1742   TROPONINI <0.01 <0.01 0.02     Recent Labs   Lab Test 04/13/18  1504   *     Recent Labs   Lab Test 04/19/18  0853   TSH 1.86     Recent Labs   Lab Test 04/13/18  1504   INR 0.99        Medical History  Surgical History Family History Social History   Past Medical History:   Diagnosis Date     Acute CHF (H) 4/13/2018     Atrial fibrillation (H)      Essential hypertension      Essential hypertension      Gout      HLD (hyperlipidemia)      Primary osteoarthritis involving multiple joints      Past Surgical History:   Procedure Laterality Date     BREAST CYST ASPIRATION  2001     EYE SURGERY      bilateral cataracts with IOL     HYSTERECTOMY       MAMMOPLASTY REDUCTION Bilateral     25 yrs ago        OOPHORECTOMY      removed one ovary     TOTAL KNEE ARTHROPLASTY Right 07/01/2016     Family History   Problem Relation Age of Onset     Lung Cancer Brother 72.00     Colon Cancer Brother 53.00     Cerebrovascular Disease Mother      Heart Disease Father      No Known Problems Sister      Hereditary Breast and Ovarian Cancer Syndrome No family hx of      Breast Cancer No family hx of      Cancer No  family hx of      Endometrial Cancer No family hx of      Ovarian Cancer No family hx of         Social History     Socioeconomic History     Marital status:      Spouse name: Not on file     Number of children: Not on file     Years of education: Not on file     Highest education level: Not on file   Occupational History     Not on file   Tobacco Use     Smoking status: Never Smoker     Smokeless tobacco: Never Used   Substance and Sexual Activity     Alcohol use: Yes     Comment: Alcoholic Drinks/day: quit using on 4/11/18, previously 1 drink Tequila/night     Drug use: No     Sexual activity: Not on file   Other Topics Concern     Not on file   Social History Narrative    [place]  Son 3 hours away .  GD in area.  7/2016       Social Determinants of Health     Financial Resource Strain: Not on file   Food Insecurity: Not on file   Transportation Needs: Not on file   Physical Activity: Not on file   Stress: Not on file   Social Connections: Not on file   Intimate Partner Violence: Not on file   Housing Stability: Not on file                      Thank you for allowing me to participate in the care of your patient.      Sincerely,     Trevin Soliman MD     [facility]  cc:   Trevin Soliman MD  [address] [place] [address]

## 2022-06-02 NOTE — LETTER
Date:June 4, 2022      Provider requested that no letter be sent. Do not send.       [facility]

## 2022-06-02 NOTE — PATIENT INSTRUCTIONS
Increase amlodipine to 10 mg per day. You can take two(2) of your current 5 mg tablets together to use up what you have, then start the single 10 mg tablets after you use up what you have.

## 2022-06-06 ENCOUNTER — LAB REQUISITION (OUTPATIENT)
Dept: LAB | Facility: CLINIC | Age: 82
End: 2022-06-06

## 2022-06-06 DIAGNOSIS — E04.1 NONTOXIC SINGLE THYROID NODULE: ICD-10-CM

## 2022-06-06 DIAGNOSIS — I10 ESSENTIAL (PRIMARY) HYPERTENSION: ICD-10-CM

## 2022-06-06 DIAGNOSIS — E78.2 MIXED HYPERLIPIDEMIA: ICD-10-CM

## 2022-06-06 LAB
ANION GAP SERPL CALCULATED.3IONS-SCNC: 13 MMOL/L (ref 5–18)
BUN SERPL-MCNC: 15 MG/DL (ref 8–28)
CALCIUM SERPL-MCNC: 9.4 MG/DL (ref 8.5–10.5)
CHLORIDE BLD-SCNC: 99 MMOL/L (ref 98–107)
CHOLEST SERPL-MCNC: 188 MG/DL
CO2 SERPL-SCNC: 31 MMOL/L (ref 22–31)
CREAT SERPL-MCNC: 0.66 MG/DL (ref 0.6–1.1)
FASTING STATUS PATIENT QL REPORTED: ABNORMAL
GFR SERPL CREATININE-BSD FRML MDRD: 89 ML/MIN/1.73M2
GLUCOSE BLD-MCNC: 98 MG/DL (ref 70–125)
HDLC SERPL-MCNC: 44 MG/DL
LDLC SERPL CALC-MCNC: 102 MG/DL
POTASSIUM BLD-SCNC: 4.1 MMOL/L (ref 3.5–5)
SODIUM SERPL-SCNC: 143 MMOL/L (ref 136–145)
TRIGL SERPL-MCNC: 211 MG/DL
TSH SERPL DL<=0.005 MIU/L-ACNC: 1.73 UIU/ML (ref 0.3–5)

## 2022-06-06 PROCEDURE — 80048 BASIC METABOLIC PNL TOTAL CA: CPT

## 2022-06-06 PROCEDURE — 84443 ASSAY THYROID STIM HORMONE: CPT

## 2022-06-06 PROCEDURE — 80061 LIPID PANEL: CPT

## 2022-07-23 ENCOUNTER — HEALTH MAINTENANCE LETTER (OUTPATIENT)
Age: 82
End: 2022-07-23

## 2022-10-01 ENCOUNTER — HEALTH MAINTENANCE LETTER (OUTPATIENT)
Age: 82
End: 2022-10-01

## 2022-12-06 ENCOUNTER — LAB REQUISITION (OUTPATIENT)
Dept: LAB | Facility: CLINIC | Age: 82
End: 2022-12-06

## 2022-12-06 DIAGNOSIS — I10 ESSENTIAL (PRIMARY) HYPERTENSION: ICD-10-CM

## 2022-12-06 PROCEDURE — 84132 ASSAY OF SERUM POTASSIUM: CPT | Performed by: PHYSICIAN ASSISTANT

## 2022-12-07 LAB — POTASSIUM SERPL-SCNC: 3.8 MMOL/L (ref 3.4–5.3)

## 2023-03-08 ENCOUNTER — LAB REQUISITION (OUTPATIENT)
Dept: LAB | Facility: CLINIC | Age: 83
End: 2023-03-08

## 2023-03-08 DIAGNOSIS — I10 ESSENTIAL (PRIMARY) HYPERTENSION: ICD-10-CM

## 2023-03-08 LAB — POTASSIUM SERPL-SCNC: 3.1 MMOL/L (ref 3.4–5.3)

## 2023-03-08 PROCEDURE — 84132 ASSAY OF SERUM POTASSIUM: CPT | Performed by: PHYSICIAN ASSISTANT

## 2023-03-09 ENCOUNTER — APPOINTMENT (OUTPATIENT)
Dept: RADIOLOGY | Facility: CLINIC | Age: 83
End: 2023-03-09
Attending: STUDENT IN AN ORGANIZED HEALTH CARE EDUCATION/TRAINING PROGRAM
Payer: COMMERCIAL

## 2023-03-09 ENCOUNTER — HOSPITAL ENCOUNTER (EMERGENCY)
Facility: CLINIC | Age: 83
Discharge: HOME | End: 2023-03-09
Attending: STUDENT IN AN ORGANIZED HEALTH CARE EDUCATION/TRAINING PROGRAM | Admitting: STUDENT IN AN ORGANIZED HEALTH CARE EDUCATION/TRAINING PROGRAM
Payer: COMMERCIAL

## 2023-03-09 VITALS
WEIGHT: 164 LBS | SYSTOLIC BLOOD PRESSURE: 125 MMHG | HEIGHT: 62 IN | HEART RATE: 75 BPM | DIASTOLIC BLOOD PRESSURE: 59 MMHG | BODY MASS INDEX: 30.18 KG/M2 | TEMPERATURE: 98.7 F | OXYGEN SATURATION: 96 % | RESPIRATION RATE: 16 BRPM

## 2023-03-09 DIAGNOSIS — M25.562 ACUTE PAIN OF LEFT KNEE: ICD-10-CM

## 2023-03-09 DIAGNOSIS — M25.462 EFFUSION OF LEFT KNEE: ICD-10-CM

## 2023-03-09 PROCEDURE — 99283 EMERGENCY DEPT VISIT LOW MDM: CPT

## 2023-03-09 PROCEDURE — 73560 X-RAY EXAM OF KNEE 1 OR 2: CPT | Mod: LT

## 2023-03-09 ASSESSMENT — ACTIVITIES OF DAILY LIVING (ADL)
ADLS_ACUITY_SCORE: 35
ADLS_ACUITY_SCORE: 33

## 2023-03-09 NOTE — ED NOTES
EMERGENCY DEPARTMENT SIGN OUT NOTE        ED COURSE AND MEDICAL DECISION MAKING  Patient was signed out to me by Dr Ventura Taylor at 2:03 PM.     In brief, Vanessa Matthew is a 82 year old female who initially presented for evaluation of knee pain.      The patient presents with complaint of left knee pain since yesterday (3/8/23). About 2 weeks ago, she started to have right knee pain with significant bruising behind the right knee. She is unsure if she strained a muscle or not. The patient has a history of right knee replacement in 2016 but denies total knee replacement on left knee. The patient carries around a walking cane but does not normally rely on it. She denies any numbness, weakness, and any other complaints or concerns at the moment.      At time of sign out, disposition was pending x-ray.     X-ray reassuringly negative for acute fracture, with advanced degenerative changes apparent with spurring suggestive of arthritis. She is amenable to plan to discharge with close follow up with orthopedics.     FINAL IMPRESSION    1. Acute pain of left knee    2. Effusion of left knee          RADIOLOGY    XR Knee Left 1/2 Views   Final Result   IMPRESSION: Advanced degenerative change within the patellofemoral compartment with extensive osteophytic spurring. No evidence for acute fracture. Additional medial compartment narrowing. Tiny knee joint effusion.          DISCHARGE MEDS  Discharge Medication List as of 3/9/2023  2:52 PM        I, Margareth Kemp, am serving as a scribe to document services personally performed by Dr. Jenn Olivares based on my observation and the provider's statements to me. I, Jenn Olivares MD attest that Margareth Kemp is acting in a scribe capacity, has observed my performance of the services and has documented them in accordance with my direction.    Jenn Olivares MD  [facility] EMERGENCY ROOM  [address] [address] [phone]      Jenn Olivares MD  03/09/23 2055

## 2023-03-09 NOTE — ED TRIAGE NOTES
Pt here with left knee pain and swelling that started yesterday. Pt on Eliquis for history of a fib. Has bruising above right knee but no pain there. Here with granddaughter. CMS itnact times 4 extremities.

## 2023-03-10 ENCOUNTER — TRANSFERRED RECORDS (OUTPATIENT)
Dept: HEALTH INFORMATION MANAGEMENT | Facility: CLINIC | Age: 83
End: 2023-03-10

## 2023-03-14 RX ORDER — FUROSEMIDE 40 MG
TABLET ORAL
Status: ON HOLD | COMMUNITY
End: 2023-06-16

## 2023-03-14 RX ORDER — SOTALOL HYDROCHLORIDE 80 MG/1
TABLET ORAL
COMMUNITY
Start: 2021-04-26 | End: 2023-06-15

## 2023-03-14 RX ORDER — HYDROCHLOROTHIAZIDE 12.5 MG/1
CAPSULE ORAL
COMMUNITY
Start: 2022-12-06 | End: 2023-06-13

## 2023-03-14 RX ORDER — AMLODIPINE BESYLATE 10 MG/1
TABLET ORAL
COMMUNITY
End: 2023-06-13

## 2023-05-19 ENCOUNTER — LAB REQUISITION (OUTPATIENT)
Dept: LAB | Facility: CLINIC | Age: 83
End: 2023-05-19

## 2023-05-19 ENCOUNTER — TRANSFERRED RECORDS (OUTPATIENT)
Dept: HEALTH INFORMATION MANAGEMENT | Facility: CLINIC | Age: 83
End: 2023-05-19

## 2023-05-19 DIAGNOSIS — I10 ESSENTIAL (PRIMARY) HYPERTENSION: ICD-10-CM

## 2023-05-19 DIAGNOSIS — E78.2 MIXED HYPERLIPIDEMIA: ICD-10-CM

## 2023-05-19 PROCEDURE — 80048 BASIC METABOLIC PNL TOTAL CA: CPT | Performed by: PHYSICIAN ASSISTANT

## 2023-05-19 PROCEDURE — 80061 LIPID PANEL: CPT | Performed by: PHYSICIAN ASSISTANT

## 2023-05-20 LAB
ANION GAP SERPL CALCULATED.3IONS-SCNC: 20 MMOL/L (ref 7–15)
BUN SERPL-MCNC: 24.1 MG/DL (ref 8–23)
CALCIUM SERPL-MCNC: 9.7 MG/DL (ref 8.8–10.2)
CHLORIDE SERPL-SCNC: 103 MMOL/L (ref 98–107)
CHOLEST SERPL-MCNC: 200 MG/DL
CREAT SERPL-MCNC: 0.82 MG/DL (ref 0.51–0.95)
DEPRECATED HCO3 PLAS-SCNC: 28 MMOL/L (ref 22–29)
GFR SERPL CREATININE-BSD FRML MDRD: 72 ML/MIN/1.73M2
GLUCOSE SERPL-MCNC: 93 MG/DL (ref 70–99)
HDLC SERPL-MCNC: 38 MG/DL
LDLC SERPL CALC-MCNC: 105 MG/DL
NONHDLC SERPL-MCNC: 162 MG/DL
POTASSIUM SERPL-SCNC: 3.2 MMOL/L (ref 3.4–5.3)
SODIUM SERPL-SCNC: 151 MMOL/L (ref 136–145)
TRIGL SERPL-MCNC: 284 MG/DL

## 2023-06-01 NOTE — PROGRESS NOTES
Discharge plan according to [facility]:       03/14/23 144   Discharge Planning   Patient/Family Anticipates Transition to home with family   Living Arrangements   People in Home alone   Type of Residence Private Residence   Is your private residence a single family home or apartment? Single family home   Number of Stairs, Within Home, Primary greater than 10 stairs   Stair Railings, Within Home, Primary railings safe and in good condition   Bathroom Shower/Tub Walk-in shower   Equipment Currently Used at Home walker, standard;cane, straight   Support System   Support Systems Family Members  (granddaughter caregiver)   Do you have someone available to stay with you one or two nights once you are home? Yes

## 2023-06-02 NOTE — CARE PLAN
Called and talked with Vanessa about her abnormal labs. Encouraged her to call her PCP to get them re-check or see if medication need to be adjusted. Vanessa stated she would call them. Next, she brought up that she will be going to TCU for 30 days after her surgery. I educated her about our process for going home the next day and that she might not qualify for rehab. She said she has already called a TCU and her insurance but I told her she still will need to qualify. She stated she does not have anyone at home who can help and she states she has a lot of stairs. I asked her about granddaughter who was listed as her support system for going home according to [facility] but she state that her granddaughter could not help. Again I educated her about our process for going home and that PT will work with her and help determine if she is safe to go home. I highly encouraged her to reach out to family/friends to see if anyone could help for even 1-2 nights after discharge but she insisted that she does not. Updated advanced ortho nurse navigator and will plan to follow up next week.    Elvin Beatty RN

## 2023-06-15 ENCOUNTER — ANESTHESIA EVENT (OUTPATIENT)
Dept: SURGERY | Facility: CLINIC | Age: 83
End: 2023-06-15
Payer: COMMERCIAL

## 2023-06-15 NOTE — TREATMENT PLAN
Orthopedic Surgery Pre-Op Plan: Vanessa Matthew  pre-op review. This is NOT an H&P   Surgeon: Dr. Clay    Hospital: [facility]  Name of Surgery: Left Total Knee Arthroplasty   Date of Surgery: 6/16/23  H&P: Completed on 5/19/23 by Emelina Pearce PA-C at [facility].   History of ASA, NSAIDS, vitamin and/or herbal supplements within 10 days: No  History of blood thinners: Yes- on Eliquis for atrial fibrillation.  Patient was instructed to hold Eliquis for 3 full days before surgery (will take last dose on 6/12/2023, then hold).    Plan:   1) Discharge Plan: Home morning of POD 1 with assist of Son. Please see Discharge Planning section near bottom of this note for further details.     2) Paroxysmal Atrial Fibrillation: Follows with Cardiology at [facility].  On sotalol and on chronic anticoagulation with Eliquis for stroke prevention.  Patient was instructed to hold Eliquis for 3 full days before surgery (take last dose on 6/12/2023, then hold).  This 3-day hold will decrease surgical bleeding risk and allow for planned spinal and neuraxial anesthesia with this surgery.  After surgery, we will plan to resume Eliquis as soon as safe from a bleeding standpoint per Dr. Clay.    3) Right Bundle Branch Block (RBBB): present on previous EKGs per note from PCP.     4) Hyperlipidemia: On atorvastatin.    5) Hypertension: Appears well controlled on amlodipine, sotalol, and furosemide.  Patient instructed to hold furosemide on the morning of surgery but to continue taking amlodipine and sotalol.    6) Hypernatremia: Sodium elevated at 151 at preop exam on 5/19/2023.  PCP felt this may have been due to some dehydration at time of lab work and encouraged patient to drink more water.  We will recheck sodium level as part of BMP on day of surgery and can monitor it postoperatively if needed.    7) Hypokalemia: Mild: Potassium 3.2 at preop exam on 5/19/2023.  PCP instructed patient to continue taking potassium supplement.  We will  recheck potassium level as part of BMP on day of surgery.    8) Gout: On allopurinol.    9) GERD: On omeprazole.    Patient appears medically optimized for upcoming surgery. I would recommend Hospitalist Consult to assist with medical management. Please call me below with any questions on this patient.       Review of Systems Notable for: Paroxysmal atrial fibrillation-on chronic anticoagulation with Eliquis, right bundle branch block, hyperlipidemia, hypertension, hypernatremia, hypokalemia, gout, GERD.    Past Medical History:   Past Medical History:   Diagnosis Date     Acute CHF (H) 04/13/2018     Antiplatelet or antithrombotic long-term use      Atrial fibrillation (H)      Essential hypertension      Essential hypertension      Gout      HLD (hyperlipidemia)      Irregular heart beat      Macular degeneration      Primary osteoarthritis involving multiple joints      RBBB      Past Surgical History:   Procedure Laterality Date     BREAST CYST ASPIRATION  2001     EYE SURGERY      bilateral cataracts with IOL     HYSTERECTOMY       MAMMOPLASTY REDUCTION Bilateral     25 yrs ago        OOPHORECTOMY      removed one ovary     TOTAL KNEE ARTHROPLASTY Right 07/01/2016       Current Medications:  Patient's Medications   New Prescriptions    No medications on file   Previous Medications    ACETAMINOPHEN (TYLENOL) 500 MG TABLET    [ACETAMINOPHEN (TYLENOL) 500 MG TABLET] Take 1,000 mg by mouth every 6 (six) hours as needed for pain.    ALLOPURINOL (ZYLOPRIM) 100 MG TABLET    Take 100 mg by mouth daily Takes 3x weeks    AMLODIPINE (NORVASC) 10 MG TABLET    Take 1 tablet (10 mg) by mouth daily    AMOXICILLIN (AMOXIL) 500 MG CAPSULE    [AMOXICILLIN (AMOXIL) 500 MG CAPSULE] daily as needed (With dental appointments).     ATORVASTATIN (LIPITOR) 40 MG TABLET    Take 40 mg by mouth daily Take 1/2 tab    CALCIUM CARBONATE (OS-SARAI) 600 MG (1,500 MG) TABLET    [CALCIUM CARBONATE (OS-SARAI) 600 MG (1,500 MG) TABLET] Take 600 mg by  mouth daily.    ELIQUIS ANTICOAGULANT 5 MG TABLET    Take 1 tablet (5 mg) by mouth 2 times daily    FLUOXETINE (PROZAC) 10 MG CAPSULE    [FLUOXETINE (PROZAC) 10 MG CAPSULE] Take 10 mg by mouth daily.    FUROSEMIDE (LASIX) 40 MG TABLET    Take 1 tablet by mouth once daily for swelling in ankles Orally Once a day for 90 day(s)    OMEPRAZOLE (PRILOSEC) 20 MG DR CAPSULE    1 capsule 30 minutes before morning meal Orally Once a day for 90 day(s)    SOTALOL (BETAPACE) 80 MG TABLET    Take 1 tablet (80 mg) by mouth every 12 hours    SOTALOL (BETAPACE) 80 MG TABLET    1 tab(s) orally 2 times a day   Modified Medications    No medications on file   Discontinued Medications    AMLODIPINE (NORVASC) 10 MG TABLET    1 tablet Orally Once a day for 30 days    AMLODIPINE (NORVASC) 5 MG TABLET    Take 1 tablet (5 mg total) by mouth daily.    APIXABAN ANTICOAGULANT (ELIQUIS) 5 MG TABLET    1 tab(s) orally 2 times a day    FUROSEMIDE (LASIX) 40 MG TABLET    [FUROSEMIDE (LASIX) 40 MG TABLET] Take 20 mg by mouth daily.    HYDROCHLOROTHIAZIDE (MICROZIDE) 12.5 MG CAPSULE    1 capsule in the morning Orally Once a day for 90 days    LOSARTAN (COZAAR) 100 MG TABLET    [LOSARTAN (COZAAR) 100 MG TABLET] Take 100 mg by mouth daily.     UNABLE TO FIND    [UNABLE TO FIND] Take 1 tablet by mouth 2 (two) times a day. Med Name: OTC eye vitamin       ALLERGIES:  Allergies   Allergen Reactions     Aspirin      Atorvastatin Unknown     numbness     Pravastatin Unknown     numbness     Iodine Rash       Social History  Social History     Tobacco Use     Smoking status: Never     Smokeless tobacco: Never   Substance Use Topics     Alcohol use: Not Currently     Drug use: No       Any Abnormal Recent Diagnostics? Yes  Sodium 151 on 5/19/2023: Shows hyper natremia.  PCP felt this may have been due to to dehydration at time of labs.  Encourage patient to drink more water.  We will recheck sodium level with BMP on day of surgery.  Potassium 3.2 on 5/19/2023:  Has history of mild hypokalemia.  PCP encouraged patient to continue taking potassium supplement.  We will recheck potassium level as part of BMP on day of surgery.    Discharge Planning:   Discharge plan according to [facility]:     Home POD 1 with assist of Son. Patient originally expecting to go TCU after this   surgery. Patient said she did not have anyone to help her at home. Patient had extensive  conversation with preop RN on 6/2/23 (see Care Plan note from 6/2/23) and was told that   she was not likely to need or qualify for TCU stay after this surgery. Patient was asked to find  help at home after surgery. Patient later confirmed to preop RN on 6/13/23 that her son would be able to help at home after surgery.       03/14/23 1443   Discharge Planning   Patient/Family Anticipates Transition to Home with Family. Patient confirmed to preop RN on 6/13/23 that her son is available to help her at home after surgery.    Living Arrangements   People in Home alone   Type of Residence Private Residence   Is your private residence a single family home or apartment? Single family home   Number of Stairs, Within Home, Primary greater than 10 stairs   Stair Railings, Within Home, Primary railings safe and in good condition   Bathroom Shower/Tub Walk-in shower   Equipment Currently Used at Home walker, standard;cane, straight   Support System   Support Systems Family Members  Son    Do you have someone available to stay with you one or two nights once you are home? Yes        CAMELIA Musa, CNP   Advanced Practice Nurse Navigator- Orthopedics  [facility]   Phone: [phone]

## 2023-06-16 ENCOUNTER — ANESTHESIA (OUTPATIENT)
Dept: SURGERY | Facility: CLINIC | Age: 83
End: 2023-06-16
Payer: COMMERCIAL

## 2023-06-16 ENCOUNTER — HOSPITAL ENCOUNTER (OUTPATIENT)
Facility: CLINIC | Age: 83
Discharge: NURSING, REHAB OR LONG-TERM CARE | End: 2023-06-18
Attending: ORTHOPAEDIC SURGERY | Admitting: ORTHOPAEDIC SURGERY
Payer: COMMERCIAL

## 2023-06-16 ENCOUNTER — APPOINTMENT (OUTPATIENT)
Dept: RADIOLOGY | Facility: CLINIC | Age: 83
End: 2023-06-16
Attending: ORTHOPAEDIC SURGERY
Payer: COMMERCIAL

## 2023-06-16 ENCOUNTER — APPOINTMENT (OUTPATIENT)
Dept: PHYSICAL THERAPY | Facility: CLINIC | Age: 83
End: 2023-06-16
Attending: ORTHOPAEDIC SURGERY
Payer: COMMERCIAL

## 2023-06-16 DIAGNOSIS — Z96.60 STATUS POST JOINT REPLACEMENT: Primary | ICD-10-CM

## 2023-06-16 DIAGNOSIS — Z96.652 S/P TOTAL KNEE ARTHROPLASTY, LEFT: ICD-10-CM

## 2023-06-16 LAB
ANION GAP SERPL CALCULATED.3IONS-SCNC: 11 MMOL/L (ref 5–18)
BUN SERPL-MCNC: 13 MG/DL (ref 8–28)
CALCIUM SERPL-MCNC: 9.5 MG/DL (ref 8.5–10.5)
CHLORIDE BLD-SCNC: 100 MMOL/L (ref 98–107)
CO2 SERPL-SCNC: 28 MMOL/L (ref 22–31)
CREAT SERPL-MCNC: 0.75 MG/DL (ref 0.6–1.1)
GFR SERPL CREATININE-BSD FRML MDRD: 80 ML/MIN/1.73M2
GLUCOSE BLD-MCNC: 106 MG/DL (ref 70–125)
POTASSIUM BLD-SCNC: 3.4 MMOL/L (ref 3.5–5)
SODIUM SERPL-SCNC: 139 MMOL/L (ref 136–145)

## 2023-06-16 PROCEDURE — 250N000009 HC RX 250: Performed by: ORTHOPAEDIC SURGERY

## 2023-06-16 PROCEDURE — 258N000003 HC RX IP 258 OP 636: Performed by: NURSE ANESTHETIST, CERTIFIED REGISTERED

## 2023-06-16 PROCEDURE — 250N000009 HC RX 250: Performed by: PHYSICIAN ASSISTANT

## 2023-06-16 PROCEDURE — 250N000011 HC RX IP 250 OP 636: Performed by: PHYSICIAN ASSISTANT

## 2023-06-16 PROCEDURE — 97161 PT EVAL LOW COMPLEX 20 MIN: CPT | Mod: GP

## 2023-06-16 PROCEDURE — 999N000141 HC STATISTIC PRE-PROCEDURE NURSING ASSESSMENT: Performed by: ORTHOPAEDIC SURGERY

## 2023-06-16 PROCEDURE — 710N000010 HC RECOVERY PHASE 1, LEVEL 2, PER MIN: Performed by: ORTHOPAEDIC SURGERY

## 2023-06-16 PROCEDURE — 250N000009 HC RX 250: Performed by: NURSE ANESTHETIST, CERTIFIED REGISTERED

## 2023-06-16 PROCEDURE — 250N000009 HC RX 250: Performed by: ANESTHESIOLOGY

## 2023-06-16 PROCEDURE — 258N000001 HC RX 258: Performed by: ORTHOPAEDIC SURGERY

## 2023-06-16 PROCEDURE — 250N000011 HC RX IP 250 OP 636: Performed by: ANESTHESIOLOGY

## 2023-06-16 PROCEDURE — 97110 THERAPEUTIC EXERCISES: CPT | Mod: GP

## 2023-06-16 PROCEDURE — 272N000001 HC OR GENERAL SUPPLY STERILE: Performed by: ORTHOPAEDIC SURGERY

## 2023-06-16 PROCEDURE — 258N000003 HC RX IP 258 OP 636: Performed by: ORTHOPAEDIC SURGERY

## 2023-06-16 PROCEDURE — 250N000013 HC RX MED GY IP 250 OP 250 PS 637: Performed by: PHYSICIAN ASSISTANT

## 2023-06-16 PROCEDURE — 250N000013 HC RX MED GY IP 250 OP 250 PS 637: Performed by: ORTHOPAEDIC SURGERY

## 2023-06-16 PROCEDURE — 82310 ASSAY OF CALCIUM: CPT | Performed by: NURSE PRACTITIONER

## 2023-06-16 PROCEDURE — 250N000013 HC RX MED GY IP 250 OP 250 PS 637: Performed by: EMERGENCY MEDICINE

## 2023-06-16 PROCEDURE — 999N000065 XR KNEE PORT LEFT 1/2 VIEWS: Mod: LT

## 2023-06-16 PROCEDURE — 250N000011 HC RX IP 250 OP 636: Performed by: ORTHOPAEDIC SURGERY

## 2023-06-16 PROCEDURE — C1776 JOINT DEVICE (IMPLANTABLE): HCPCS | Performed by: ORTHOPAEDIC SURGERY

## 2023-06-16 PROCEDURE — 370N000017 HC ANESTHESIA TECHNICAL FEE, PER MIN: Performed by: ORTHOPAEDIC SURGERY

## 2023-06-16 PROCEDURE — 258N000003 HC RX IP 258 OP 636: Performed by: ANESTHESIOLOGY

## 2023-06-16 PROCEDURE — 99204 OFFICE O/P NEW MOD 45 MIN: CPT | Performed by: EMERGENCY MEDICINE

## 2023-06-16 PROCEDURE — 250N000013 HC RX MED GY IP 250 OP 250 PS 637: Performed by: ANESTHESIOLOGY

## 2023-06-16 PROCEDURE — 36415 COLL VENOUS BLD VENIPUNCTURE: CPT | Performed by: NURSE PRACTITIONER

## 2023-06-16 PROCEDURE — 97530 THERAPEUTIC ACTIVITIES: CPT | Mod: GP

## 2023-06-16 PROCEDURE — C1713 ANCHOR/SCREW BN/BN,TIS/BN: HCPCS | Performed by: ORTHOPAEDIC SURGERY

## 2023-06-16 PROCEDURE — 360N000077 HC SURGERY LEVEL 4, PER MIN: Performed by: ORTHOPAEDIC SURGERY

## 2023-06-16 PROCEDURE — 250N000011 HC RX IP 250 OP 636: Performed by: NURSE ANESTHETIST, CERTIFIED REGISTERED

## 2023-06-16 DEVICE — CRUCIATE RETAINING FEMORAL
Type: IMPLANTABLE DEVICE | Site: KNEE | Status: FUNCTIONAL
Brand: TRIATHLON

## 2023-06-16 DEVICE — BONE CEMENT SIMPLEX FULL DOSE 6191-1-001: Type: IMPLANTABLE DEVICE | Site: KNEE | Status: FUNCTIONAL

## 2023-06-16 DEVICE — TIBIAL BEARING INSERT
Type: IMPLANTABLE DEVICE | Site: KNEE | Status: FUNCTIONAL
Brand: TRIATHLON

## 2023-06-16 DEVICE — PRIMARY TIBIAL BASEPLATE
Type: IMPLANTABLE DEVICE | Site: KNEE | Status: FUNCTIONAL
Brand: TRIATHLON

## 2023-06-16 RX ORDER — NALOXONE HYDROCHLORIDE 0.4 MG/ML
0.4 INJECTION, SOLUTION INTRAMUSCULAR; INTRAVENOUS; SUBCUTANEOUS
Status: DISCONTINUED | OUTPATIENT
Start: 2023-06-16 | End: 2023-06-18 | Stop reason: HOSPADM

## 2023-06-16 RX ORDER — HALOPERIDOL 5 MG/ML
1 INJECTION INTRAMUSCULAR
Status: DISCONTINUED | OUTPATIENT
Start: 2023-06-16 | End: 2023-06-16 | Stop reason: HOSPADM

## 2023-06-16 RX ORDER — TRANEXAMIC ACID 650 MG/1
1950 TABLET ORAL ONCE
Status: COMPLETED | OUTPATIENT
Start: 2023-06-16 | End: 2023-06-16

## 2023-06-16 RX ORDER — OXYCODONE HYDROCHLORIDE 5 MG/1
5 TABLET ORAL
Status: DISCONTINUED | OUTPATIENT
Start: 2023-06-16 | End: 2023-06-16 | Stop reason: HOSPADM

## 2023-06-16 RX ORDER — ACETAMINOPHEN 325 MG/1
975 TABLET ORAL ONCE
Status: COMPLETED | OUTPATIENT
Start: 2023-06-16 | End: 2023-06-16

## 2023-06-16 RX ORDER — PROCHLORPERAZINE MALEATE 5 MG
5 TABLET ORAL EVERY 6 HOURS PRN
Status: DISCONTINUED | OUTPATIENT
Start: 2023-06-16 | End: 2023-06-18 | Stop reason: HOSPADM

## 2023-06-16 RX ORDER — ATORVASTATIN CALCIUM 40 MG/1
40 TABLET, FILM COATED ORAL DAILY
Status: DISCONTINUED | OUTPATIENT
Start: 2023-06-17 | End: 2023-06-18 | Stop reason: HOSPADM

## 2023-06-16 RX ORDER — FENTANYL CITRATE 50 UG/ML
50 INJECTION, SOLUTION INTRAMUSCULAR; INTRAVENOUS EVERY 5 MIN PRN
Status: DISCONTINUED | OUTPATIENT
Start: 2023-06-16 | End: 2023-06-16 | Stop reason: HOSPADM

## 2023-06-16 RX ORDER — PROPOFOL 10 MG/ML
INJECTION, EMULSION INTRAVENOUS CONTINUOUS PRN
Status: DISCONTINUED | OUTPATIENT
Start: 2023-06-16 | End: 2023-06-16

## 2023-06-16 RX ORDER — ONDANSETRON 2 MG/ML
INJECTION INTRAMUSCULAR; INTRAVENOUS PRN
Status: DISCONTINUED | OUTPATIENT
Start: 2023-06-16 | End: 2023-06-16

## 2023-06-16 RX ORDER — ONDANSETRON 2 MG/ML
4 INJECTION INTRAMUSCULAR; INTRAVENOUS EVERY 30 MIN PRN
Status: DISCONTINUED | OUTPATIENT
Start: 2023-06-16 | End: 2023-06-16 | Stop reason: HOSPADM

## 2023-06-16 RX ORDER — BUPIVACAINE HYDROCHLORIDE 7.5 MG/ML
INJECTION, SOLUTION INTRASPINAL
Status: COMPLETED | OUTPATIENT
Start: 2023-06-16 | End: 2023-06-16

## 2023-06-16 RX ORDER — NALOXONE HYDROCHLORIDE 0.4 MG/ML
0.2 INJECTION, SOLUTION INTRAMUSCULAR; INTRAVENOUS; SUBCUTANEOUS
Status: DISCONTINUED | OUTPATIENT
Start: 2023-06-16 | End: 2023-06-18 | Stop reason: HOSPADM

## 2023-06-16 RX ORDER — SOTALOL HYDROCHLORIDE 80 MG/1
80 TABLET ORAL EVERY 12 HOURS
Status: DISCONTINUED | OUTPATIENT
Start: 2023-06-16 | End: 2023-06-18 | Stop reason: HOSPADM

## 2023-06-16 RX ORDER — ACETAMINOPHEN 325 MG/1
975 TABLET ORAL EVERY 8 HOURS
Status: DISCONTINUED | OUTPATIENT
Start: 2023-06-16 | End: 2023-06-18 | Stop reason: HOSPADM

## 2023-06-16 RX ORDER — OXYCODONE HYDROCHLORIDE 5 MG/1
5 TABLET ORAL EVERY 4 HOURS PRN
Status: DISCONTINUED | OUTPATIENT
Start: 2023-06-16 | End: 2023-06-18 | Stop reason: HOSPADM

## 2023-06-16 RX ORDER — AMOXICILLIN 250 MG
1 CAPSULE ORAL 2 TIMES DAILY
Status: DISCONTINUED | OUTPATIENT
Start: 2023-06-16 | End: 2023-06-18 | Stop reason: HOSPADM

## 2023-06-16 RX ORDER — ACETAMINOPHEN 325 MG/1
650 TABLET ORAL EVERY 4 HOURS PRN
Status: DISCONTINUED | OUTPATIENT
Start: 2023-06-19 | End: 2023-06-18 | Stop reason: HOSPADM

## 2023-06-16 RX ORDER — SODIUM CHLORIDE, SODIUM LACTATE, POTASSIUM CHLORIDE, CALCIUM CHLORIDE 600; 310; 30; 20 MG/100ML; MG/100ML; MG/100ML; MG/100ML
INJECTION, SOLUTION INTRAVENOUS CONTINUOUS
Status: DISCONTINUED | OUTPATIENT
Start: 2023-06-16 | End: 2023-06-18 | Stop reason: HOSPADM

## 2023-06-16 RX ORDER — OXYCODONE HYDROCHLORIDE 5 MG/1
2.5-5 TABLET ORAL EVERY 4 HOURS PRN
Qty: 25 TABLET | Refills: 0 | Status: SHIPPED | OUTPATIENT
Start: 2023-06-16 | End: 2023-06-18

## 2023-06-16 RX ORDER — FLUOXETINE 10 MG/1
10 CAPSULE ORAL DAILY
Status: DISCONTINUED | OUTPATIENT
Start: 2023-06-17 | End: 2023-06-18 | Stop reason: HOSPADM

## 2023-06-16 RX ORDER — BISACODYL 10 MG
10 SUPPOSITORY, RECTAL RECTAL DAILY PRN
Status: DISCONTINUED | OUTPATIENT
Start: 2023-06-16 | End: 2023-06-18 | Stop reason: HOSPADM

## 2023-06-16 RX ORDER — ONDANSETRON 4 MG/1
4 TABLET, ORALLY DISINTEGRATING ORAL EVERY 6 HOURS PRN
Status: DISCONTINUED | OUTPATIENT
Start: 2023-06-16 | End: 2023-06-18 | Stop reason: HOSPADM

## 2023-06-16 RX ORDER — HYDROMORPHONE HCL IN WATER/PF 6 MG/30 ML
0.4 PATIENT CONTROLLED ANALGESIA SYRINGE INTRAVENOUS
Status: DISCONTINUED | OUTPATIENT
Start: 2023-06-16 | End: 2023-06-18 | Stop reason: HOSPADM

## 2023-06-16 RX ORDER — CEFAZOLIN SODIUM 1 G/3ML
1 INJECTION, POWDER, FOR SOLUTION INTRAMUSCULAR; INTRAVENOUS EVERY 8 HOURS
Status: COMPLETED | OUTPATIENT
Start: 2023-06-16 | End: 2023-06-16

## 2023-06-16 RX ORDER — DIPHENHYDRAMINE HYDROCHLORIDE 50 MG/ML
12.5 INJECTION INTRAMUSCULAR; INTRAVENOUS EVERY 6 HOURS PRN
Status: DISCONTINUED | OUTPATIENT
Start: 2023-06-16 | End: 2023-06-16 | Stop reason: HOSPADM

## 2023-06-16 RX ORDER — ALLOPURINOL 100 MG/1
100 TABLET ORAL
Status: DISCONTINUED | OUTPATIENT
Start: 2023-06-19 | End: 2023-06-18 | Stop reason: HOSPADM

## 2023-06-16 RX ORDER — CEFAZOLIN SODIUM/WATER 2 G/20 ML
2 SYRINGE (ML) INTRAVENOUS SEE ADMIN INSTRUCTIONS
Status: DISCONTINUED | OUTPATIENT
Start: 2023-06-16 | End: 2023-06-16 | Stop reason: HOSPADM

## 2023-06-16 RX ORDER — MEPERIDINE HYDROCHLORIDE 25 MG/ML
12.5 INJECTION INTRAMUSCULAR; INTRAVENOUS; SUBCUTANEOUS EVERY 5 MIN PRN
Status: DISCONTINUED | OUTPATIENT
Start: 2023-06-16 | End: 2023-06-16 | Stop reason: HOSPADM

## 2023-06-16 RX ORDER — FENTANYL CITRATE 50 UG/ML
25 INJECTION, SOLUTION INTRAMUSCULAR; INTRAVENOUS
Status: DISCONTINUED | OUTPATIENT
Start: 2023-06-16 | End: 2023-06-16 | Stop reason: HOSPADM

## 2023-06-16 RX ORDER — CARBOXYMETHYLCELLULOSE SODIUM 5 MG/ML
1 SOLUTION/ DROPS OPHTHALMIC 3 TIMES DAILY PRN
COMMUNITY

## 2023-06-16 RX ORDER — ONDANSETRON 4 MG/1
4 TABLET, ORALLY DISINTEGRATING ORAL EVERY 30 MIN PRN
Status: DISCONTINUED | OUTPATIENT
Start: 2023-06-16 | End: 2023-06-16 | Stop reason: HOSPADM

## 2023-06-16 RX ORDER — DEXAMETHASONE SODIUM PHOSPHATE 10 MG/ML
INJECTION, SOLUTION INTRAMUSCULAR; INTRAVENOUS PRN
Status: DISCONTINUED | OUTPATIENT
Start: 2023-06-16 | End: 2023-06-16

## 2023-06-16 RX ORDER — HYDROXYZINE HYDROCHLORIDE 10 MG/1
10 TABLET, FILM COATED ORAL EVERY 6 HOURS PRN
Status: DISCONTINUED | OUTPATIENT
Start: 2023-06-16 | End: 2023-06-18 | Stop reason: HOSPADM

## 2023-06-16 RX ORDER — SENNA AND DOCUSATE SODIUM 50; 8.6 MG/1; MG/1
1 TABLET, FILM COATED ORAL AT BEDTIME
Qty: 30 TABLET | Refills: 0 | Status: SHIPPED | OUTPATIENT
Start: 2023-06-16 | End: 2023-07-01

## 2023-06-16 RX ORDER — OXYCODONE HYDROCHLORIDE 5 MG/1
10 TABLET ORAL
Status: DISCONTINUED | OUTPATIENT
Start: 2023-06-16 | End: 2023-06-16 | Stop reason: HOSPADM

## 2023-06-16 RX ORDER — OXYCODONE HYDROCHLORIDE 5 MG/1
10 TABLET ORAL EVERY 4 HOURS PRN
Status: DISCONTINUED | OUTPATIENT
Start: 2023-06-16 | End: 2023-06-18 | Stop reason: HOSPADM

## 2023-06-16 RX ORDER — POTASSIUM CHLORIDE 1500 MG/1
20 TABLET, EXTENDED RELEASE ORAL DAILY
Status: DISCONTINUED | OUTPATIENT
Start: 2023-06-16 | End: 2023-06-18 | Stop reason: HOSPADM

## 2023-06-16 RX ORDER — HYDROMORPHONE HCL IN WATER/PF 6 MG/30 ML
0.4 PATIENT CONTROLLED ANALGESIA SYRINGE INTRAVENOUS EVERY 5 MIN PRN
Status: DISCONTINUED | OUTPATIENT
Start: 2023-06-16 | End: 2023-06-16 | Stop reason: HOSPADM

## 2023-06-16 RX ORDER — POTASSIUM CHLORIDE 1500 MG/1
20 TABLET, EXTENDED RELEASE ORAL DAILY
COMMUNITY
End: 2024-02-27

## 2023-06-16 RX ORDER — PANTOPRAZOLE SODIUM 40 MG/1
40 TABLET, DELAYED RELEASE ORAL DAILY
Status: DISCONTINUED | OUTPATIENT
Start: 2023-06-17 | End: 2023-06-18 | Stop reason: HOSPADM

## 2023-06-16 RX ORDER — DIPHENHYDRAMINE HCL 12.5 MG/5ML
12.5 SOLUTION ORAL EVERY 6 HOURS PRN
Status: DISCONTINUED | OUTPATIENT
Start: 2023-06-16 | End: 2023-06-16 | Stop reason: HOSPADM

## 2023-06-16 RX ORDER — HYDROCHLOROTHIAZIDE 12.5 MG/1
12.5 CAPSULE ORAL DAILY
Status: DISCONTINUED | OUTPATIENT
Start: 2023-06-17 | End: 2023-06-18 | Stop reason: HOSPADM

## 2023-06-16 RX ORDER — HYDROCHLOROTHIAZIDE 12.5 MG/1
12.5 CAPSULE ORAL DAILY
COMMUNITY
End: 2024-02-27 | Stop reason: ALTCHOICE

## 2023-06-16 RX ORDER — HYDROMORPHONE HCL IN WATER/PF 6 MG/30 ML
0.2 PATIENT CONTROLLED ANALGESIA SYRINGE INTRAVENOUS
Status: DISCONTINUED | OUTPATIENT
Start: 2023-06-16 | End: 2023-06-18 | Stop reason: HOSPADM

## 2023-06-16 RX ORDER — GLYCOPYRROLATE 0.2 MG/ML
INJECTION, SOLUTION INTRAMUSCULAR; INTRAVENOUS PRN
Status: DISCONTINUED | OUTPATIENT
Start: 2023-06-16 | End: 2023-06-16

## 2023-06-16 RX ORDER — SODIUM CHLORIDE, SODIUM LACTATE, POTASSIUM CHLORIDE, CALCIUM CHLORIDE 600; 310; 30; 20 MG/100ML; MG/100ML; MG/100ML; MG/100ML
INJECTION, SOLUTION INTRAVENOUS CONTINUOUS
Status: DISCONTINUED | OUTPATIENT
Start: 2023-06-16 | End: 2023-06-16 | Stop reason: HOSPADM

## 2023-06-16 RX ORDER — FENTANYL CITRATE 50 UG/ML
25-100 INJECTION, SOLUTION INTRAMUSCULAR; INTRAVENOUS
Status: DISCONTINUED | OUTPATIENT
Start: 2023-06-16 | End: 2023-06-16 | Stop reason: HOSPADM

## 2023-06-16 RX ORDER — LIDOCAINE 40 MG/G
CREAM TOPICAL
Status: DISCONTINUED | OUTPATIENT
Start: 2023-06-16 | End: 2023-06-16 | Stop reason: HOSPADM

## 2023-06-16 RX ORDER — LIDOCAINE HYDROCHLORIDE 10 MG/ML
INJECTION, SOLUTION INFILTRATION; PERINEURAL PRN
Status: DISCONTINUED | OUTPATIENT
Start: 2023-06-16 | End: 2023-06-16

## 2023-06-16 RX ORDER — POLYETHYLENE GLYCOL 3350 17 G/17G
17 POWDER, FOR SOLUTION ORAL DAILY
Status: DISCONTINUED | OUTPATIENT
Start: 2023-06-17 | End: 2023-06-18 | Stop reason: HOSPADM

## 2023-06-16 RX ORDER — MAGNESIUM HYDROXIDE 1200 MG/15ML
LIQUID ORAL PRN
Status: DISCONTINUED | OUTPATIENT
Start: 2023-06-16 | End: 2023-06-16 | Stop reason: HOSPADM

## 2023-06-16 RX ORDER — EPHEDRINE SULFATE 50 MG/ML
INJECTION, SOLUTION INTRAMUSCULAR; INTRAVENOUS; SUBCUTANEOUS PRN
Status: DISCONTINUED | OUTPATIENT
Start: 2023-06-16 | End: 2023-06-16

## 2023-06-16 RX ORDER — ONDANSETRON 2 MG/ML
4 INJECTION INTRAMUSCULAR; INTRAVENOUS EVERY 6 HOURS PRN
Status: DISCONTINUED | OUTPATIENT
Start: 2023-06-16 | End: 2023-06-18 | Stop reason: HOSPADM

## 2023-06-16 RX ORDER — METHOCARBAMOL 500 MG/1
500 TABLET, FILM COATED ORAL EVERY 6 HOURS PRN
Status: DISCONTINUED | OUTPATIENT
Start: 2023-06-16 | End: 2023-06-18 | Stop reason: HOSPADM

## 2023-06-16 RX ORDER — CARBOXYMETHYLCELLULOSE SODIUM 5 MG/ML
1 SOLUTION/ DROPS OPHTHALMIC 3 TIMES DAILY PRN
Status: DISCONTINUED | OUTPATIENT
Start: 2023-06-16 | End: 2023-06-18 | Stop reason: HOSPADM

## 2023-06-16 RX ORDER — AMLODIPINE BESYLATE 10 MG/1
10 TABLET ORAL DAILY
Status: DISCONTINUED | OUTPATIENT
Start: 2023-06-17 | End: 2023-06-18 | Stop reason: HOSPADM

## 2023-06-16 RX ORDER — MAGNESIUM SULFATE 4 G/50ML
4 INJECTION INTRAVENOUS ONCE
Status: COMPLETED | OUTPATIENT
Start: 2023-06-16 | End: 2023-06-16

## 2023-06-16 RX ORDER — FUROSEMIDE 40 MG
40 TABLET ORAL DAILY
Status: DISCONTINUED | OUTPATIENT
Start: 2023-06-17 | End: 2023-06-18 | Stop reason: HOSPADM

## 2023-06-16 RX ORDER — FUROSEMIDE 40 MG
40 TABLET ORAL DAILY
COMMUNITY
End: 2024-02-27

## 2023-06-16 RX ORDER — HYDROMORPHONE HCL IN WATER/PF 6 MG/30 ML
0.2 PATIENT CONTROLLED ANALGESIA SYRINGE INTRAVENOUS EVERY 5 MIN PRN
Status: DISCONTINUED | OUTPATIENT
Start: 2023-06-16 | End: 2023-06-16 | Stop reason: HOSPADM

## 2023-06-16 RX ORDER — FENTANYL CITRATE 50 UG/ML
25 INJECTION, SOLUTION INTRAMUSCULAR; INTRAVENOUS EVERY 5 MIN PRN
Status: DISCONTINUED | OUTPATIENT
Start: 2023-06-16 | End: 2023-06-16 | Stop reason: HOSPADM

## 2023-06-16 RX ORDER — LIDOCAINE 40 MG/G
CREAM TOPICAL
Status: DISCONTINUED | OUTPATIENT
Start: 2023-06-16 | End: 2023-06-18 | Stop reason: HOSPADM

## 2023-06-16 RX ORDER — CEFAZOLIN SODIUM/WATER 2 G/20 ML
2 SYRINGE (ML) INTRAVENOUS
Status: COMPLETED | OUTPATIENT
Start: 2023-06-16 | End: 2023-06-16

## 2023-06-16 RX ADMIN — GLYCOPYRROLATE 0.2 MG: 0.2 INJECTION INTRAMUSCULAR; INTRAVENOUS at 07:17

## 2023-06-16 RX ADMIN — PROPOFOL 80 MCG/KG/MIN: 10 INJECTION, EMULSION INTRAVENOUS at 07:18

## 2023-06-16 RX ADMIN — ACETAMINOPHEN 975 MG: 325 TABLET ORAL at 13:01

## 2023-06-16 RX ADMIN — GLYCOPYRROLATE 0.1 MG: 0.2 INJECTION INTRAMUSCULAR; INTRAVENOUS at 07:34

## 2023-06-16 RX ADMIN — LIDOCAINE HYDROCHLORIDE 1 ML: 10 INJECTION, SOLUTION INFILTRATION; PERINEURAL at 07:18

## 2023-06-16 RX ADMIN — MIDAZOLAM HYDROCHLORIDE 1 MG: 1 INJECTION, SOLUTION INTRAMUSCULAR; INTRAVENOUS at 06:47

## 2023-06-16 RX ADMIN — GLYCOPYRROLATE 0.1 MG: 0.2 INJECTION INTRAMUSCULAR; INTRAVENOUS at 07:52

## 2023-06-16 RX ADMIN — APIXABAN 5 MG: 5 TABLET, FILM COATED ORAL at 19:03

## 2023-06-16 RX ADMIN — CEFAZOLIN 1 G: 1 INJECTION, POWDER, FOR SOLUTION INTRAMUSCULAR; INTRAVENOUS at 22:11

## 2023-06-16 RX ADMIN — ACETAMINOPHEN 975 MG: 325 TABLET ORAL at 05:54

## 2023-06-16 RX ADMIN — SODIUM CHLORIDE, POTASSIUM CHLORIDE, SODIUM LACTATE AND CALCIUM CHLORIDE: 600; 310; 30; 20 INJECTION, SOLUTION INTRAVENOUS at 06:18

## 2023-06-16 RX ADMIN — SODIUM CHLORIDE, POTASSIUM CHLORIDE, SODIUM LACTATE AND CALCIUM CHLORIDE: 600; 310; 30; 20 INJECTION, SOLUTION INTRAVENOUS at 11:28

## 2023-06-16 RX ADMIN — TRANEXAMIC ACID 1950 MG: 650 TABLET ORAL at 05:54

## 2023-06-16 RX ADMIN — HYDROXYZINE HYDROCHLORIDE 10 MG: 10 TABLET ORAL at 19:04

## 2023-06-16 RX ADMIN — Medication 5 MG: at 08:16

## 2023-06-16 RX ADMIN — ACETAMINOPHEN 975 MG: 325 TABLET ORAL at 21:18

## 2023-06-16 RX ADMIN — SODIUM CHLORIDE, POTASSIUM CHLORIDE, SODIUM LACTATE AND CALCIUM CHLORIDE: 600; 310; 30; 20 INJECTION, SOLUTION INTRAVENOUS at 08:29

## 2023-06-16 RX ADMIN — SOTALOL HYDROCHLORIDE 80 MG: 80 TABLET ORAL at 19:04

## 2023-06-16 RX ADMIN — DEXAMETHASONE SODIUM PHOSPHATE 10 MG: 10 INJECTION, SOLUTION INTRAMUSCULAR; INTRAVENOUS at 07:17

## 2023-06-16 RX ADMIN — ROPIVACAINE HYDROCHLORIDE 15 ML: 2 INJECTION, SOLUTION EPIDURAL; INFILTRATION at 06:50

## 2023-06-16 RX ADMIN — ONDANSETRON 4 MG: 2 INJECTION INTRAMUSCULAR; INTRAVENOUS at 07:19

## 2023-06-16 RX ADMIN — SENNOSIDES AND DOCUSATE SODIUM 1 TABLET: 50; 8.6 TABLET ORAL at 12:55

## 2023-06-16 RX ADMIN — Medication 2 G: at 07:10

## 2023-06-16 RX ADMIN — MAGNESIUM SULFATE HEPTAHYDRATE 4 G: 4 INJECTION, SOLUTION INTRAVENOUS at 06:18

## 2023-06-16 RX ADMIN — FENTANYL CITRATE 50 MCG: 50 INJECTION, SOLUTION INTRAMUSCULAR; INTRAVENOUS at 06:48

## 2023-06-16 RX ADMIN — CEFAZOLIN 1 G: 1 INJECTION, POWDER, FOR SOLUTION INTRAMUSCULAR; INTRAVENOUS at 14:55

## 2023-06-16 RX ADMIN — OXYCODONE HYDROCHLORIDE 5 MG: 5 TABLET ORAL at 19:03

## 2023-06-16 RX ADMIN — SENNOSIDES AND DOCUSATE SODIUM 1 TABLET: 50; 8.6 TABLET ORAL at 19:04

## 2023-06-16 RX ADMIN — PHENYLEPHRINE HYDROCHLORIDE 0.3 MCG/KG/MIN: 10 INJECTION INTRAVENOUS at 07:18

## 2023-06-16 RX ADMIN — BUPIVACAINE HYDROCHLORIDE IN DEXTROSE 1.6 ML: 7.5 INJECTION, SOLUTION SUBARACHNOID at 07:15

## 2023-06-16 RX ADMIN — APIXABAN 5 MG: 5 TABLET, FILM COATED ORAL at 12:58

## 2023-06-16 ASSESSMENT — ACTIVITIES OF DAILY LIVING (ADL)
ADLS_ACUITY_SCORE: 32
ADLS_ACUITY_SCORE: 28
ADLS_ACUITY_SCORE: 32
ADLS_ACUITY_SCORE: 32
ADLS_ACUITY_SCORE: 28

## 2023-06-16 NOTE — PROGRESS NOTES
06/16/23 1400   Appointment Info   Signing Clinician's Name / Credentials (PT) Boby Martin, PT, DPT   Quick Adds   Quick Adds Certification   Living Environment   People in Home alone   Current Living Arrangements house   Home Accessibility stairs to enter home;stairs within home   Number of Stairs, Main Entrance 2   Number of Stairs, Within Home, Primary greater than 10 stairs   Self-Care   Usual Activity Tolerance good   Current Activity Tolerance moderate   Equipment Currently Used at Home walker, standard;cane, straight   General Information   Onset of Illness/Injury or Date of Surgery 06/16/23   Referring Physician Dr. Clay   Patient/Family Therapy Goals Statement (PT) Decrease pain with mobility   Pertinent History of Current Problem (include personal factors and/or comorbidities that impact the POC) s/p TKA   Existing Precautions/Restrictions weight bearing   Weight-Bearing Status - LLE weight-bearing as tolerated   Weight-Bearing Status - RLE full weight-bearing   Range of Motion (ROM)   ROM Comment WFL, decreased LLE s/p TKA   Strength (Manual Muscle Testing)   Strength Comments WFL   Bed Mobility   Bed Mobility supine-sit;sit-supine   Supine-Sit [place] (Bed Mobility) modified independence   Sit-Supine [place] (Bed Mobility) modified independence   Assistive Device (Bed Mobility) bed rails   Transfers   Transfers sit-stand transfer   Sit-Stand Transfer   Sit-Stand [place] (Transfers) contact guard   Assistive Device (Sit-Stand Transfers) walker, front-wheeled   Gait/Stairs (Locomotion)   [place] Level (Gait) contact guard   Assistive Device (Gait) walker, front-wheeled   Distance in Feet 10'   Distance in Feet (Gait) 25'   Pattern (Gait) step-through   Deviations/Abnormal Patterns (Gait) antalgic;brent decreased;stride length decreased   Clinical Impression   Criteria for Skilled Therapeutic Intervention Yes, treatment indicated   PT Diagnosis (PT) Impaired functional mobility    Influenced by the following impairments Weakness, pain   Functional limitations due to impairments Transfers, gait, stairs   Clinical Presentation (PT Evaluation Complexity) Stable/Uncomplicated   Clinical Presentation Rationale Pt presents as medically diagnosed   Clinical Decision Making (Complexity) low complexity   Planned Therapy Interventions (PT) gait training;home exercise program;patient/family education;ROM (range of motion);stair training;strengthening;transfer training   Anticipated Equipment Needs at Discharge (PT) walker, rolling   Risk & Benefits of therapy have been explained care plan/treatment goals reviewed;patient  (aissatour)   PT Total Evaluation Time   PT Eval, Low Complexity Minutes (24760) 10   Plan of Care Review   Plan of Care Reviewed With patient;marija)   Therapy Certification   Start of care date 06/16/23   Certification date from 06/16/23   Certification date to 07/16/23   Medical Diagnosis s/p TKA   Physical Therapy Goals   PT Frequency Daily   PT Predicted Duration/Target Date for Goal Attainment 06/19/23   PT Goals Transfers;Gait;Stairs;PT Goal 1   PT: Transfers Supervision/stand-by assist;Sit to/from stand   PT: Gait Supervision/stand-by assist;Rolling walker;100 feet   PT: Stairs Supervision/stand-by assist;4 stairs;Rail on both sides   PT: Goal 1 I with TKA HEP   Interventions   Interventions Quick Adds Gait Training;Therapeutic Activity;Therapeutic Procedure   Therapeutic Procedure/Exercise   Ther. Procedure: strength, endurance, ROM, flexibillity Minutes (59451) 10   Symptoms Noted During/After Treatment fatigue;increased pain   Treatment Detail/Skilled Intervention I with TKA HEP following cues for proper carry out/technique.   Therapeutic Activity   Therapeutic Activities: dynamic activities to improve functional performance Minutes (31698) 10   Symptoms Noted During/After Treatment Fatigue;Increased pain   Treatment Detail/Skilled Intervention Supine<>sit Mod I  with HOB elevated, BP low upon arrival, increased time sitting SBA at EOB and in standing to ensure VSS. Mild dizziness in standing, increased time for set up. Education provided to pt and granddtr about therapy role and d/c situation.   Gait Training   Gait Training Minutes (30218) 5   Symptoms Noted During/After Treatment (Gait Training) fatigue;increased pain   Treatment Detail/Skilled Intervention Pt amb slowly around the room with FWW and CGA, increased pain reported and mild dizziness that was stable throughout session, encouraged pt to amb with nursing staff later. Cues for navigation and safety as pt had IV that PT managed. Gait deficits below.   [place] Level (Gait Training) contact guard   Physical Assistance Level (Gait Training) supervision;verbal cues   Weight Bearing (Gait Training) weight-bearing as tolerated   Assistive Device (Gait Training) rolling walker   Pattern Analysis (Gait Training) swing-through gait   Gait Analysis Deviations decreased brent;decreased step length;decreased weight-shifting ability   Impairments (Gait Analysis/Training) pain   PT Discharge Planning   PT Plan Amb, stairs, review TKA HEP   PT Discharge Recommendation (DC Rec)   (Defer to ortho team)   PT Rationale for DC Rec Pt concerned about going home due to living at home and not having help, currently amb short distances, has not done stairs, having moderate pain levels. Transferring well. Will see how pt does tomorrow with therapy.   PT Brief overview of current status SBA with transfers, amb 25' with FWW   [facility]  OUTPATIENT PHYSICAL THERAPY EVALUATION  PLAN OF TREATMENT FOR OUTPATIENT REHABILITATION  (COMPLETE FOR INITIAL CLAIMS ONLY)  Patient's Last Name, First Name, M.I.  YOB: 1940  Vanessa Matthew                        Provider's Name  [facility] Medical Record No.  [id number]                             Onset Date:  (P)  06/16/23   Start of Care Date:  (P) 06/16/23   Type:     _X_PT   ___OT   ___SLP Medical Diagnosis:  (P) s/p TKA              PT Diagnosis:  (P) Impaired functional mobility Visits from SOC:  1     See note for plan of treatment, functional goals and certification details    I CERTIFY THE NEED FOR THESE SERVICES FURNISHED UNDER        THIS PLAN OF TREATMENT AND WHILE UNDER MY CARE     (Physician co-signature of this document indicates review and certification of the therapy plan).

## 2023-06-16 NOTE — ANESTHESIA PREPROCEDURE EVALUATION
Anesthesia Pre-Procedure Evaluation    Patient: Vanessa Matthew   MRN: [id number] : 1940        Procedure : Procedure(s):  LEFT TOTAL KNEE ARTHROPLASTY          Past Medical History:   Diagnosis Date     Acute CHF (H) 2018     Antiplatelet or antithrombotic long-term use      Atrial fibrillation (H)      Essential hypertension      Essential hypertension      Gout      HLD (hyperlipidemia)      Irregular heart beat      Macular degeneration      Primary osteoarthritis involving multiple joints      RBBB       Past Surgical History:   Procedure Laterality Date     BREAST CYST ASPIRATION  2001     EYE SURGERY      bilateral cataracts with IOL     HYSTERECTOMY       MAMMOPLASTY REDUCTION Bilateral     25 yrs ago        OOPHORECTOMY      removed one ovary     TOTAL KNEE ARTHROPLASTY Right 2016      Allergies   Allergen Reactions     Aspirin      Atorvastatin Unknown     numbness     Pravastatin Unknown     numbness     Iodine Rash      Social History     Tobacco Use     Smoking status: Never     Smokeless tobacco: Never   Vaping Use     Vaping status: Not on file   Substance Use Topics     Alcohol use: Not Currently      Wt Readings from Last 1 Encounters:   06/15/23 71.2 kg (157 lb)        Anesthesia Evaluation   Pt has had prior anesthetic.         ROS/MED HX  ENT/Pulmonary:  - neg pulmonary ROS     Neurologic:  - neg neurologic ROS     Cardiovascular:     (+) hypertension-----Taking blood thinners CHF Irregular Heartbeat/Palpitations,     METS/Exercise Tolerance: >4 METS    Hematologic:  - neg hematologic  ROS     Musculoskeletal:  - neg musculoskeletal ROS     GI/Hepatic:  - neg GI/hepatic ROS     Renal/Genitourinary:  - neg Renal ROS     Endo:  - neg endo ROS     Psychiatric/Substance Use:  - neg psychiatric ROS     Infectious Disease:  - neg infectious disease ROS     Malignancy:  - neg malignancy ROS     Other:  - neg other ROS          Physical Exam    Airway  airway exam normal       Mallampati: II    Neck ROM: full   Mouth opening: > 3 cm    Respiratory Devices and Support         Dental       (+) Minor Abnormalities - some fillings, tiny chips      Cardiovascular   cardiovascular exam normal       Rhythm and rate: regular and normal     Pulmonary   pulmonary exam normal        breath sounds clear to auscultation           OUTSIDE LABS:  CBC:   Lab Results   Component Value Date    WBC 8.8 09/12/2018    WBC 9.0 08/28/2018    HGB 12.9 09/12/2018    HGB 13.6 08/28/2018    HCT 39.4 09/12/2018    HCT 42.0 08/28/2018     09/12/2018     08/28/2018     BMP:   Lab Results   Component Value Date     06/16/2023     (H) 05/19/2023    POTASSIUM 3.4 (L) 06/16/2023    POTASSIUM 3.2 (L) 05/19/2023    CHLORIDE 100 06/16/2023    CHLORIDE 103 05/19/2023    CO2 28 06/16/2023    CO2 28 05/19/2023    BUN 13 06/16/2023    BUN 24.1 (H) 05/19/2023    CR 0.75 06/16/2023    CR 0.82 05/19/2023     06/16/2023    GLC 93 05/19/2023     COAGS:   Lab Results   Component Value Date    PTT 28 04/13/2018    INR 0.99 04/13/2018     POC: No results found for: BGM, HCG, HCGS  HEPATIC:   Lab Results   Component Value Date    ALBUMIN 3.8 04/15/2018    PROTTOTAL 7.8 04/15/2018    ALT 21 04/15/2018    AST 22 04/15/2018    ALKPHOS 86 04/15/2018    BILITOTAL 0.4 04/15/2018     OTHER:   Lab Results   Component Value Date    SARAI 9.5 06/16/2023    PHOS 3.2 04/14/2018    MAG 2.3 08/28/2018    TSH 1.73 06/06/2022       Anesthesia Plan    ASA Status:  3      Anesthesia Type: Spinal.              Consents    Anesthesia Plan(s) and associated risks, benefits, and realistic alternatives discussed. Questions answered and patient/representative(s) expressed understanding.    - Discussed:     - Discussed with:  Patient, Other (See Comment)      - Extended Intubation/Ventilatory Support Discussed: No.      - Patient is DNR/DNI Status: No    Use of blood products discussed: Yes.     - Discussed with: Patient.     -  Consented: consented to blood products            Reason for refusal: other.     Postoperative Care    Pain management: Peripheral nerve block (Single Shot).   PONV prophylaxis: Ondansetron (or other 5HT-3), Dexamethasone or Solumedrol     Comments:    Other Comments: SAB. ACB for POA as requested by surgeon            Omega Payne MD

## 2023-06-16 NOTE — ANESTHESIA CARE TRANSFER NOTE
Patient: Vanessa Matthew    Procedure: Procedure(s):  LEFT TOTAL KNEE ARTHROPLASTY       Diagnosis: Primary osteoarthritis of left knee [M17.12]  Diagnosis Additional Information: No value filed.    Anesthesia Type:   Spinal     Note:    Oropharynx: oropharynx clear of all foreign objects  Level of Consciousness: awake  Oxygen Supplementation: room air    Independent Airway: airway patency satisfactory and stable  Dentition: dentition unchanged  Vital Signs Stable: post-procedure vital signs reviewed and stable  Report to RN Given: handoff report given  Patient transferred to: PACU    Handoff Report: Identifed the Patient, Identified the Reponsible Provider, Reviewed the pertinent medical history, Discussed the surgical course, Reviewed Intra-OP anesthesia mangement and issues during anesthesia, Set expectations for post-procedure period and Allowed opportunity for questions and acknowledgement of understanding      Vitals:  Vitals Value Taken Time   BP 91/52 06/16/23 0856   Temp 97.8    Pulse 56 06/16/23 0857   Resp 17 06/16/23 0857   SpO2 93 % 06/16/23 0857   Vitals shown include unvalidated device data.    Electronically Signed By: CAMELIA Ordonez CRNA  June 16, 2023  8:58 AM

## 2023-06-16 NOTE — ANESTHESIA PROCEDURE NOTES
"Intrathecal injection Procedure Note    Pre-Procedure   Staff -        Anesthesiologist:  Omega Payne MD       Performed By: anesthesiologist       Location: OR       Procedure Start/Stop Times: 6/16/2023 7:11 AM and 6/16/2023 7:15 AM       Pre-Anesthestic Checklist: patient identified, IV checked, risks and benefits discussed, informed consent, monitors and equipment checked, pre-op evaluation, at physician/surgeon's request and post-op pain management  Timeout:       Correct Patient: Yes        Correct Procedure: Yes        Correct Site: Yes        Correct Position: Yes   Procedure Documentation  Procedure: intrathecal injection       Patient Position: sitting       Patient Prep/Sterile Barriers: sterile gloves, mask, patient draped       Skin prep: Chloraprep       Insertion Site: L3-4. (midline approach).       Needle Gauge: 24.        Needle Length (Inches): 3.5        Spinal Needle Type: Pencan       Introducer used       # of attempts: 1 and  # of redirects:     Assessment/Narrative         Paresthesias: No.       CSF fluid: clear.    Medication(s) Administered   0.75% Hyperbaric Bupivacaine (Intrathecal) - Intrathecal   1.6 mL - 6/16/2023 7:15:00 AM  Medication Administration Time: 6/16/2023 7:11 AM      FOR [facility] ONLY:   Pain Team Contact information: please page the Pain Team Via [employer]. Search \"Pain\". During daytime hours, please page the attending first. At night please page the resident first.      "

## 2023-06-16 NOTE — OP NOTE
Operative Report    PATIENT Vanessa Matthew   DATE OF SURGERY:  6/16/2023    PREOPERATIVE DIAGNOSIS   Primary osteoarthritis of left knee [M17.12].    POSTOPERATIVE DIAGNOSIS   Primary osteoarthritis of left knee [M17.12].    PROCEDURE PERFORMED   LEFT total knee arthroplasty    IMPLANTS  Implant Name Type Inv. Item Serial No.  Lot No. LRB No. Used Action   BONE CEMENT SIMPLEX FULL DOSE 6191-1-001 - [id number] Cement, Bone BONE CEMENT SIMPLEX FULL DOSE 6191-1-001  [employer] [id number] Left 2 Implanted   IMP BASEPLATE TIBIAL HOWM TRI 2 5520-B-200 - [id number] Total Joint Component/Insert IMP BASEPLATE TIBIAL HOWM TRI 2 5520-B-200  [employer] [id number] Left 1 Implanted   IMP COMP FEM STRK TRIATHLN CR LT 2 5510-F-201 - [id number] Total Joint Component/Insert IMP COMP FEM STRK TRIATHLN CR LT 2 5510-F-201  [employer] [id number] Left 1 Implanted   INSERT TIB 2 9MM KN PE CNDRL STAB BRNG TRTHLN STRL LF - [id number] Total Joint Component/Insert INSERT TIB 2 9MM KN PE CNDRL STAB BRNG TRTHLN STRL LF  [employer] [id number] Left 1 Implanted       SURGEON  Pindea Clay MD     ASSISTANT   Racquel Richards PA-C; assistant was required for patient positioning, surgical assistance, wound closure and monitoring patient's safety throughout the case.    ANESTHESIA  General with Block      FINDINGS:  1. Tricompartmental cartilage loss  2. Osteopenia/osteoporosis  3. Native patella 15mm, too thin in setting of osteopenia/osteoporosis to safely resurface.    SPECIMENS:  none    ESTIMATED BLOOD LOSS:  50cc    COMPLICATIONS   None.        INDICATION FOR PROCEDURE  Vanessa Matthew is a 82 year old female who I evaluated in my clinic for severe LEFT knee pain.  X-rays confirmed end-stage osteoarthritis.  All conservative treatments were exhausted including: Activity modification, NSAIDs and intra-articular injections. These no longer provided symptom relief.  The patient's quality of life and activities of daily living  were being greatly affected.  Due to this, the patient was indicated for total knee arthroplasty.      PREOPERATIVE EXAMINATION:   No significant flexion contracture.     INFORMED CONSENT  Vanessa Matthew was identified in the preoperative holding area and was identified using medical record number, name, and date of birth, all of which were confirmed. The operative extremity was marked using an indelible marker. Once again, the risks, benefits and expected outcomes of the procedure were discussed in full.  This discussion included, but was not limited to: Bleeding, nerve/vascular injury, fracture, instability, implant complications, continued pain, stiffness, scarring/incision numbness, infection, anesthetic/medical complications, death.  After this discussion patient elected to proceed with procedure, and did sign informed consent.    DESCRIPTION OF PROCEDURE   Vanessa Matthew received a regional block in the preoperative holding area.  They were then brought back to the operating room and placed on the operating table in the supine position.  All bony prominences were well-padded.  A well-padded tourniquet was placed high on the operative thigh. The operative leg was then sterilely prepped and draped in the usual fashion with ChloraPrep.     A timeout was performed prior to the start of the procedure.  This confirmed the patient's name, correct site and side of surgery, and the procedure being performed.  Allergies were also confirmed.  All necessary implants were confirmed and available.  Administration of IV antibiotics and TXA were confirmed. Three independent staff members agreed with the information discussed in the timeout.     The leg was exsanguinated and the tourniquet was inflated.  A midline approach to the knee was utilized.  Sharp dissection was performed down to the level of the capsule. Medial and lateral skin flaps were raised.  A medial parapatellar arthrotomy was performed. The anterior horn of  the medial meniscus was resected and a medial release was performed around the tibial plateau. Medial osteophytes were resected.  The knee was extended and patellar fat pad was excised being careful to protect the patellar tendon.      Attention was turned back to the femur.  The ACL was sacrificed. This the opening drill was utilized to open the femoral canal.  The flexible intramedullary guide was then placed.  The distal femoral cutting jig was pinned into place in 5 degrees of valgus with a depth of 8 mm.  Intramedullary guide was then removed and distal femoral cut was then made.  This was ensured to be flat.  The pins were left in place and attention was turned to the tibia.     With the knee flexed and retractors placed to protect ligaments and vascular structures, the extramedullary tibial guide was placed.   The slope was checked and the distal aspect was centered on the ankle.  The jig was pinned, and depth of resection was verified.  The proximal tibial resection was performed using an oscillating saw being careful to protect the bone block, PCL and posterior structures.  The jig was removed with the pins were left in place.  At this point, the knee was brought down to full extension and a 9 mm spacer block was placed.  Checked the medial and lateral gaps which were found to be acceptable.  The 2 pins in the tibia and the 2 pins in the femur were then removed.     Attention was turned back to the distal femur.  The knee was flexed to 90 degrees.  Using the [employer] tensioner, the rotation of the flexion gap was checked and found to be 5 degrees.  4-in-1 cutting guide was then set to this amount of external rotation.  This was then applied to the distal femur and the femur was appropriately sized.  The  holes were drilled.  The 4-in-1 cutting guide was then pinned into place.  The posterior cut was made first and again a 9 mm spacer block was utilized to ensure that the flexion space would accept a 9  mm poly.  Once this was done the remainder of the cuts were made.    A laminar  was placed in the lateral side.  The medial meniscus was removed.  The posterior medial osteophytes were removed.  The shoulder was then moved to the medial side and the lateral meniscus was removed.  The posterior lateral osteophytes were also removed.     At this point, we moved to trialing.  A femoral trial was placed.  A tibial baseplate with a 9 mm poly preloaded onto it were then placed onto the tibia and the knee was brought down into extension.  Full extension was obtained.  The medial-lateral balance was acceptable.  Flexion to 130 degrees was obtained.  The medial and lateral balance at 90 degrees of flexion was also found to be acceptable. Drawer exam was also found to be acceptable.     We then everted the patella and placed towel clamps.  Osteophytes removed. Native patella measured 15mm at thickest point and as such not felt safe to resurface in this setting.      The trial components were then removed.  The proximal tibia was exposed with retractors in place.  The tibial component was appropriately sized and set to the appropriate rotation and then pinned into place.  The tibia was then punched.  The baseplate was then removed.  Local injection cocktail was placed in the soft tissues surrounding the knee.  The bony surfaces were then thoroughly irrigated and dried with a sponge in preparation for cementing.     The cement was mixed and the components were sequentially cemented.  The tibia was impacted down into place and excess cement was removed.  The final poly was placed into the tibial baseplate and this was reduced back under the femur.  Cement was applied to the distal femur.  Femoral component was then impacted into place and cement was removed.  The knee was brought down into full extension.  At this point, the cement was allowed to fully cure.    Again the balance both in flexion and extension was found to  be adequate.  Full extension was obtained.  130 degrees of flexion was obtained.     The wound was closed in layers with a #2 vicryl and #1 Stratafix for the capsular closure, 2-0 vicryl, and 3-0 monocryl, followed by skin glue. The aquacel dressing was applied, followed by a full leg ACE wrap.      The patient was then transferred to the postoperative bed, awoken from anesthesia and taken to recovery in stable condition.  There were no complications.  The patient tolerated the procedure well.    POSTOPERATIVE PLAN:  -Pain control  -PT/OT, WBAT  -24 hours antibiotics  -Home eliquis DVT ppx  -X-rays to be completed in PACU    Pineda Clay MD  [facility]

## 2023-06-16 NOTE — ANESTHESIA POSTPROCEDURE EVALUATION
Patient: Vanessa Matthew    Procedure: Procedure(s):  LEFT TOTAL KNEE ARTHROPLASTY       Anesthesia Type:  Spinal    Note:  Disposition: Outpatient   Postop Pain Control: Uneventful            Sign Out: Well controlled pain   PONV: No   Neuro/Psych: Uneventful            Sign Out: Acceptable/Baseline neuro status   Airway/Respiratory: Uneventful            Sign Out: Acceptable/Baseline resp. status   CV/Hemodynamics: Uneventful            Sign Out: Acceptable CV status; No obvious hypovolemia; No obvious fluid overload   Other NRE: NONE   DID A NON-ROUTINE EVENT OCCUR? No           Last vitals:  Vitals Value Taken Time   BP 87/50 06/16/23 0930   Temp 37  C (98.6  F) 06/16/23 0930   Pulse 56 06/16/23 0932   Resp 14 06/16/23 0932   SpO2 96 % 06/16/23 0932   Vitals shown include unvalidated device data.    Electronically Signed By: Omega Payne MD  June 16, 2023  11:50 AM

## 2023-06-16 NOTE — INTERVAL H&P NOTE
"I have reviewed the surgical (or preoperative) H&P that is linked to this encounter, and examined the patient. There are no significant changes    Clinical Conditions Present on Arrival:  Clinically Significant Risk Factors Present on Admission        # Hypokalemia: Lowest K = 3.2 mmol/L in last 30 days, will replace as needed  # Hypernatremia: Highest Na = 151 mmol/L in last 30 days, will monitor as appropriate     # Anion Gap Metabolic Acidosis: Highest Anion Gap = 20 mmol/L in last 30 days, will monitor and treat as appropriate    # Drug Induced Coagulation Defect: home medication list includes an anticoagulant medication   # Overweight: Estimated body mass index is 28.72 kg/m  as calculated from the following:    Height as of this encounter: 1.575 m (5' 2\").    Weight as of this encounter: 71.2 kg (157 lb).       "

## 2023-06-16 NOTE — PHARMACY-ADMISSION MEDICATION HISTORY
Pharmacist Admission Medication History    Admission medication history is complete. The information provided in this note is only as accurate as the sources available at the time of the update.    Medication reconciliation/reorder completed by provider prior to medication history? No    Information Source(s): Patient and CareEverywhere/SureScripts via in-person    Pertinent Information:      Changes made to PTA medication list:    Added: hydrochlorothiazide, Potassium    Deleted: None    Changed: None    Medication Affordability:       Allergies reviewed with patient and updates made in EHR: yes    Medication History Completed By: Juventino Julian RPH 6/16/2023 6:57 AM    Prior to Admission medications    Medication Sig Last Dose Taking? Auth Provider Long Term End Date   acetaminophen (TYLENOL) 500 MG tablet [ACETAMINOPHEN (TYLENOL) 500 MG TABLET] Take 1,000 mg by mouth every 6 (six) hours as needed for pain. Past Week Yes Provider, Historical     allopurinol (ZYLOPRIM) 100 MG tablet Take 100 mg by mouth three times a week MWF 6/16/2023 at am Yes Provider, Historical     amLODIPine (NORVASC) 10 MG tablet Take 1 tablet (10 mg) by mouth daily 6/16/2023 at am Yes Trevin Soliman MD Yes    atorvastatin (LIPITOR) 40 MG tablet Take 40 mg by mouth daily 6/16/2023 at am Yes Provider, Historical     calcium carbonate (OS-SARAI) 600 mg (1,500 mg) tablet [CALCIUM CARBONATE (OS-SARAI) 600 MG (1,500 MG) TABLET] Take 600 mg by mouth daily. 6/16/2023 at am Yes Provider, Historical     ELIQUIS ANTICOAGULANT 5 MG tablet Take 1 tablet (5 mg) by mouth 2 times daily 6/12/2023 Yes Trevin Soliman MD Yes    FLUoxetine (PROZAC) 10 MG capsule [FLUOXETINE (PROZAC) 10 MG CAPSULE] Take 10 mg by mouth daily. 6/16/2023 at am Yes Provider, Historical     hydrochlorothiazide (MICROZIDE) 12.5 MG capsule Take 12.5 mg by mouth daily 6/15/2023 Yes Unknown, Entered By History No    omeprazole (PRILOSEC) 20 MG DR capsule 1 capsule 30  minutes before morning meal Orally Once a day for 90 day(s) 6/16/2023 at am Yes Reported, Patient     potassium chloride ER (KLOR-CON M) 20 MEQ CR tablet Take 20 mEq by mouth daily 6/16/2023 at am Yes Unknown, Entered By History     sotalol (BETAPACE) 80 MG tablet Take 1 tablet (80 mg) by mouth every 12 hours 6/16/2023 at am Yes Trevin Soliman MD Yes    amoxicillin (AMOXIL) 500 MG capsule [AMOXICILLIN (AMOXIL) 500 MG CAPSULE] daily as needed (With dental appointments).    Provider, Historical

## 2023-06-16 NOTE — ANESTHESIA PROCEDURE NOTES
Adductor canal Procedure Note    Pre-Procedure   Staff -        Anesthesiologist:  Omega Payne MD       Performed By: anesthesiologist       Location: pre-op       Procedure Start/Stop Times: 6/16/2023 6:47 AM and 6/16/2023 6:50 AM       Pre-Anesthestic Checklist: patient identified, IV checked, site marked, risks and benefits discussed, informed consent, monitors and equipment checked, pre-op evaluation, at physician/surgeon's request and post-op pain management  Timeout:       Correct Patient: Yes        Correct Procedure: Yes        Correct Site: Yes        Correct Position: Yes        Correct Laterality: Yes        Site Marked: Yes  Procedure Documentation  Procedure: Adductor canal       Diagnosis: REQUESTED BY SURGEON FOR POSTOP PAIN       Laterality: left       Patient Position: supine       Patient Prep/Sterile Barriers: sterile gloves, mask       Skin prep: Chloraprep       Needle Type: other (echogenic)       Needle Gauge: 20.        Needle Length (Inches): 6        Ultrasound guided       1. Ultrasound was used to identify targeted nerve, plexus, vascular marker, or fascial plane and place a needle adjacent to it in real-time.       2. Ultrasound was used to visualize the spread of anesthetic in close proximity to the above referenced structure.       3. A permanent image is entered into the patient's record.       4. The visualized anatomic structures appeared normal.       5. There were no apparent abnormal pathologic findings.    Assessment/Narrative         The placement was negative for: blood aspirated, painful injection and site bleeding       Paresthesias: No.       Bolus given via needle..        Secured via.        Insertion/Infusion Method: Single Shot       Complications: none    Medication(s) Administered   Ropivacaine 0.5% w/ 1:400K Epi (Injection) - Injection   15 mL - 6/16/2023 6:50:00 AM  Medication Administration Time: 6/16/2023 6:47 AM      FOR [facility] ([facility] [place]) ONLY:   Pain Team  "Contact information: please page the Pain Team Via [facility]. Search \"Pain\". During daytime hours, please page the attending first. At night please page the resident first.      "

## 2023-06-16 NOTE — CONSULTS
[facility] MEDICINE CONSULT NOTE   Physician requesting consult: Pineda Clay MD    Reason for consult: Postoperative medical management of medical co-morbidities as below    Assessment and Plan       82 year old female into [facility] on 6/16/2023 for an elective left TKA.     [facility] [place] asked to evaluate patient for postoperative medical management as follows below. Please resume the home medications as reconciled and further noted below with ordered hold parameters.  Vital signs have been stable post-operatively including hemodynamically stable blood pressure and heart rate. Thank you for this consult; we will continue to follow this patient until discharge.    Problem list:    1.  POD #0 left TKA: per ortho  2.  History of atrial fibrillation:  Sotolol, eliquis  3.  Drug induced coagulation defect: eliquis per ortho  4.  HTN:  Hydrochlorothiazide, norvasc; tomorrow  5.  HFpEF: lasix tomorrow per home schedule  6.  History of gout:  3 times weekly allopurinol          -Reviewed the patient's preoperative H and P and updated missing elements.  -Home medication reconciliation has been reviewed.  Medications have been ordered as noted from the home list and changes are documented above       Past Medical History     Patient Active Problem List    Diagnosis Date Noted     Status post joint replacement 06/16/2023     Priority: Medium     Heart failure with preserved ejection fraction (H) 05/07/2018     Priority: Medium     Atrial fibrillation (H) 04/13/2018     Priority: Medium     Essential hypertension      Priority: Medium     H/O total knee replacement, right 07/06/2016     Priority: Medium     HLD (hyperlipidemia)      Priority: Medium     Primary osteoarthritis involving multiple joints      Priority: Medium     Gout      Priority: Medium        Surgical History   She  has a past surgical history that includes Hysterectomy; Oophorectomy; Total Knee Arthroplasty (Right,  07/01/2016); Eye surgery; Mammoplasty reduction (Bilateral); and Breast Cyst Aspiration (2001).     Past Surgical History:   Procedure Laterality Date     BREAST CYST ASPIRATION  2001     EYE SURGERY      bilateral cataracts with IOL     HYSTERECTOMY       MAMMOPLASTY REDUCTION Bilateral     25 yrs ago        OOPHORECTOMY      removed one ovary     TOTAL KNEE ARTHROPLASTY Right 07/01/2016       Family History    Reviewed, and family history includes Cerebrovascular Disease in her mother; Colon Cancer (age of onset: 53.00) in her brother; Heart Disease in her father; Lung Cancer (age of onset: 72.00) in her brother; No Known Problems in her sister.    Social History    Reviewed, and she  reports that she has never smoked. She has never used smokeless tobacco. She reports that she does not currently use alcohol. She reports that she does not use drugs.  Social History     Tobacco Use     Smoking status: Never     Smokeless tobacco: Never   Vaping Use     Vaping status: Not on file   Substance Use Topics     Alcohol use: Not Currently       Allergies     Allergies   Allergen Reactions     Aspirin      Atorvastatin Unknown     numbness     Pravastatin Unknown     numbness     Iodine Rash       Prior to Admission Medications      Medications Prior to Admission   Medication Sig Dispense Refill Last Dose     acetaminophen (TYLENOL) 500 MG tablet [ACETAMINOPHEN (TYLENOL) 500 MG TABLET] Take 1,000 mg by mouth every 6 (six) hours as needed for pain.   Past Week     allopurinol (ZYLOPRIM) 100 MG tablet Take 100 mg by mouth three times a week MWF   6/16/2023 at am     amLODIPine (NORVASC) 10 MG tablet Take 1 tablet (10 mg) by mouth daily 90 tablet 3 6/16/2023 at am     atorvastatin (LIPITOR) 40 MG tablet Take 40 mg by mouth daily   6/16/2023 at am     calcium carbonate (OS-SARAI) 600 mg (1,500 mg) tablet [CALCIUM CARBONATE (OS-SARAI) 600 MG (1,500 MG) TABLET] Take 600 mg by mouth daily.   6/16/2023 at am     ELIQUIS ANTICOAGULANT 5  MG tablet Take 1 tablet (5 mg) by mouth 2 times daily 180 tablet 3 6/12/2023     FLUoxetine (PROZAC) 10 MG capsule [FLUOXETINE (PROZAC) 10 MG CAPSULE] Take 10 mg by mouth daily.  11 6/16/2023 at am     furosemide (LASIX) 40 MG tablet Take 40 mg by mouth daily   6/15/2023     hydrochlorothiazide (MICROZIDE) 12.5 MG capsule Take 12.5 mg by mouth daily   6/15/2023     omeprazole (PRILOSEC) 20 MG DR capsule 1 capsule 30 minutes before morning meal Orally Once a day for 90 day(s)   6/16/2023 at am     potassium chloride ER (KLOR-CON M) 20 MEQ CR tablet Take 20 mEq by mouth daily   6/16/2023 at am     sotalol (BETAPACE) 80 MG tablet Take 1 tablet (80 mg) by mouth every 12 hours 180 tablet 3 6/16/2023 at am     amoxicillin (AMOXIL) 500 MG capsule [AMOXICILLIN (AMOXIL) 500 MG CAPSULE] daily as needed (With dental appointments).   0        Review of Systems   A 12 point comprehensive review of systems was negative except as noted above.    OBJECTIVE         Physical Exam   Temp:  [98.6  F (37  C)-99.1  F (37.3  C)] 98.6  F (37  C)  Pulse:  [50-59] 56  Resp:  [11-19] 12  BP: ()/(47-82) 87/50  FiO2 (%):  [2 %] 2 %  SpO2:  [93 %-100 %] 95 %  Body mass index is 28.72 kg/m .     GENERAL:  Alert, appears comfortable, in no acute distress, appears stated age   HEAD:  Normocephalic, without obvious abnormality, atraumatic   EYES:  PERRL, conjunctiva/corneas clear, no scleral icterus, EOM's intact   NOSE: Nares normal, septum midline, mucosa normal, no drainage   THROAT: Lips, mucosa, and tongue normal; teeth and gums normal, mouth moist   NECK: Supple, symmetrical, trachea midline   BACK:   Symmetric, no curvature, ROM normal   LUNGS:   Clear to auscultation bilaterally, no rales, rhonchi, or wheezing, symmetric chest rise on inhalation, respirations unlabored   CHEST WALL:  No tenderness or deformity   HEART:  Regular rate and rhythm, S1 and S2 normal, no murmur, rub, or gallop    ABDOMEN:   Soft, non-tender, bowel sounds  "active all four quadrants, no masses, no organomegaly, no rebound or guarding   EXTREMITIES: Post op dressing not pulled; bilateral pink warm feet   SKIN: Dry to touch, no exanthems in the visualized areas   NEURO: Alert, oriented x 4, moves all four extremities freely/spontaneously   PSYCH: Cooperative, behavior is appropriate            Imaging Reviewed Personally By Myself    Radiology Results:   Recent Results (from the past 24 hour(s))   POC US Guidance Needle Placement    Narrative    Ultrasound was performed as guidance to an anesthesia procedure.  Click   \"PACS images\" hyperlink below to view any stored images.  For specific   procedure details, view procedure note authored by anesthesia.   XR Knee Port Left 1/2 Views    Narrative    EXAM: XR KNEE PORT LEFT 1/2 VIEWS  LOCATION: [facility]  DATE: 6/16/2023    INDICATION: Post Op Total Knee  COMPARISON: 03/09/2023.      Impression    IMPRESSION: Postoperative changes left total knee replacement. Hardware is well seated without fracture.       Labs Reviewed Personally By Myself     Results for orders placed or performed during the hospital encounter of 06/16/23 (from the past 24 hour(s))   POC US Guidance Needle Placement    Narrative    Ultrasound was performed as guidance to an anesthesia procedure.  Click   \"PACS images\" hyperlink below to view any stored images.  For specific   procedure details, view procedure note authored by anesthesia.   Basic metabolic panel   Result Value Ref Range    Sodium 139 136 - 145 mmol/L    Potassium 3.4 (L) 3.5 - 5.0 mmol/L    Chloride 100 98 - 107 mmol/L    Carbon Dioxide (CO2) 28 22 - 31 mmol/L    Anion Gap 11 5 - 18 mmol/L    Urea Nitrogen 13 8 - 28 mg/dL    Creatinine 0.75 0.60 - 1.10 mg/dL    Calcium 9.5 8.5 - 10.5 mg/dL    Glucose 106 70 - 125 mg/dL    GFR Estimate 80 >60 mL/min/1.73m2   XR Knee Port Left 1/2 Views    Narrative    EXAM: XR KNEE PORT LEFT 1/2 VIEWS  LOCATION: [facility]  DATE: 6/16/2023    INDICATION: Post Op Total Knee  COMPARISON: 03/09/2023.      Impression    IMPRESSION: Postoperative changes left total knee replacement. Hardware is well seated without fracture.       Preoperative Labs Reviewed Personally By Myself     Thank you for this consultation.  Appreciate the opportunity to participate in the care of Vanessa RODRIGUEZ Sixtomaikol, please feel free to contact us for any questions or concerns.    Faiza Dias MD  [facility]  Phone: #[phone]

## 2023-06-17 ENCOUNTER — APPOINTMENT (OUTPATIENT)
Dept: PHYSICAL THERAPY | Facility: CLINIC | Age: 83
End: 2023-06-17
Attending: ORTHOPAEDIC SURGERY
Payer: COMMERCIAL

## 2023-06-17 ENCOUNTER — APPOINTMENT (OUTPATIENT)
Dept: OCCUPATIONAL THERAPY | Facility: CLINIC | Age: 83
End: 2023-06-17
Attending: ORTHOPAEDIC SURGERY
Payer: COMMERCIAL

## 2023-06-17 LAB — HGB BLD-MCNC: 12.4 G/DL (ref 11.7–15.7)

## 2023-06-17 PROCEDURE — 97116 GAIT TRAINING THERAPY: CPT | Mod: GP

## 2023-06-17 PROCEDURE — 258N000003 HC RX IP 258 OP 636: Performed by: EMERGENCY MEDICINE

## 2023-06-17 PROCEDURE — 97166 OT EVAL MOD COMPLEX 45 MIN: CPT | Mod: GO

## 2023-06-17 PROCEDURE — 97110 THERAPEUTIC EXERCISES: CPT | Mod: GP

## 2023-06-17 PROCEDURE — 97535 SELF CARE MNGMENT TRAINING: CPT | Mod: GO

## 2023-06-17 PROCEDURE — 85018 HEMOGLOBIN: CPT | Performed by: ORTHOPAEDIC SURGERY

## 2023-06-17 PROCEDURE — 99214 OFFICE O/P EST MOD 30 MIN: CPT | Performed by: EMERGENCY MEDICINE

## 2023-06-17 PROCEDURE — 250N000013 HC RX MED GY IP 250 OP 250 PS 637: Performed by: ORTHOPAEDIC SURGERY

## 2023-06-17 PROCEDURE — 36415 COLL VENOUS BLD VENIPUNCTURE: CPT | Performed by: ORTHOPAEDIC SURGERY

## 2023-06-17 PROCEDURE — 250N000013 HC RX MED GY IP 250 OP 250 PS 637: Performed by: EMERGENCY MEDICINE

## 2023-06-17 RX ADMIN — SODIUM CHLORIDE, POTASSIUM CHLORIDE, SODIUM LACTATE AND CALCIUM CHLORIDE: 600; 310; 30; 20 INJECTION, SOLUTION INTRAVENOUS at 15:02

## 2023-06-17 RX ADMIN — OXYCODONE HYDROCHLORIDE 10 MG: 5 TABLET ORAL at 20:30

## 2023-06-17 RX ADMIN — ATORVASTATIN CALCIUM 40 MG: 40 TABLET, FILM COATED ORAL at 07:36

## 2023-06-17 RX ADMIN — APIXABAN 5 MG: 5 TABLET, FILM COATED ORAL at 20:27

## 2023-06-17 RX ADMIN — HYDROCHLOROTHIAZIDE 12.5 MG: 12.5 CAPSULE ORAL at 07:36

## 2023-06-17 RX ADMIN — AMLODIPINE BESYLATE 10 MG: 10 TABLET ORAL at 07:37

## 2023-06-17 RX ADMIN — HYDROXYZINE HYDROCHLORIDE 10 MG: 10 TABLET ORAL at 16:34

## 2023-06-17 RX ADMIN — SENNOSIDES AND DOCUSATE SODIUM 1 TABLET: 50; 8.6 TABLET ORAL at 20:27

## 2023-06-17 RX ADMIN — OXYCODONE HYDROCHLORIDE 10 MG: 5 TABLET ORAL at 07:41

## 2023-06-17 RX ADMIN — ACETAMINOPHEN 975 MG: 325 TABLET ORAL at 13:15

## 2023-06-17 RX ADMIN — ACETAMINOPHEN 975 MG: 325 TABLET ORAL at 05:42

## 2023-06-17 RX ADMIN — SOTALOL HYDROCHLORIDE 80 MG: 80 TABLET ORAL at 05:45

## 2023-06-17 RX ADMIN — PANTOPRAZOLE SODIUM 40 MG: 40 TABLET, DELAYED RELEASE ORAL at 07:37

## 2023-06-17 RX ADMIN — APIXABAN 5 MG: 5 TABLET, FILM COATED ORAL at 07:37

## 2023-06-17 RX ADMIN — OXYCODONE HYDROCHLORIDE 10 MG: 5 TABLET ORAL at 13:15

## 2023-06-17 RX ADMIN — POLYETHYLENE GLYCOL 3350 17 G: 17 POWDER, FOR SOLUTION ORAL at 07:36

## 2023-06-17 RX ADMIN — FUROSEMIDE 40 MG: 40 TABLET ORAL at 07:36

## 2023-06-17 RX ADMIN — POTASSIUM CHLORIDE 20 MEQ: 1500 TABLET, EXTENDED RELEASE ORAL at 07:37

## 2023-06-17 RX ADMIN — ACETAMINOPHEN 975 MG: 325 TABLET ORAL at 20:26

## 2023-06-17 RX ADMIN — SOTALOL HYDROCHLORIDE 80 MG: 80 TABLET ORAL at 20:26

## 2023-06-17 RX ADMIN — METHOCARBAMOL TABLETS 500 MG: 500 TABLET, COATED ORAL at 13:15

## 2023-06-17 RX ADMIN — FLUOXETINE 10 MG: 10 CAPSULE ORAL at 07:37

## 2023-06-17 RX ADMIN — SENNOSIDES AND DOCUSATE SODIUM 1 TABLET: 50; 8.6 TABLET ORAL at 07:36

## 2023-06-17 ASSESSMENT — ACTIVITIES OF DAILY LIVING (ADL)
ADLS_ACUITY_SCORE: 28
DEPENDENT_IADLS:: INDEPENDENT
ADLS_ACUITY_SCORE: 28
ADLS_ACUITY_SCORE: 32
ADLS_ACUITY_SCORE: 28

## 2023-06-17 NOTE — PLAN OF CARE
Problem: Knee Arthroplasty  Goal: Optimal Coping  Outcome: Progressing  Goal: Absence of Bleeding  Outcome: Progressing  Goal: Fluid and Electrolyte Balance  Outcome: Progressing  Goal: Optimal Functional Ability  Outcome: Progressing  Intervention: Promote Optimal Functional Status  Recent Flowsheet Documentation  Taken 6/17/2023 0730 by Shelby Malik RN  Activity Management: up in chair  Goal: Absence of Infection Signs and Symptoms  Outcome: Progressing  Goal: Intact Neurovascular Status  Outcome: Progressing  Goal: Anesthesia/Sedation Recovery  Outcome: Progressing  Intervention: Optimize Anesthesia Recovery  Recent Flowsheet Documentation  Taken 6/17/2023 0730 by Shelby Malik RN  Safety Promotion/Fall Prevention:    activity supervised    clutter free environment maintained    increased rounding and observation    increase visualization of patient    lighting adjusted    mobility aid in reach    nonskid shoes/slippers when out of bed    patient and family education    room door open    room near nurse's station    room organization consistent    safety round/check completed    supervised activity    treat reversible contributory factors    treat underlying cause  Goal: Acceptable Pain Control  Outcome: Progressing  Intervention: Prevent or Manage Pain  Recent Flowsheet Documentation  Taken 6/17/2023 1315 by Shelby Malik RN  Pain Management Interventions: medication (see MAR)  Taken 6/17/2023 0826 by Shelby Malik RN  Pain Management Interventions: ambulation/increased activity  Taken 6/17/2023 0741 by Shelby Malik RN  Pain Management Interventions: medication (see MAR)  Goal: Nausea and Vomiting Relief  Outcome: Progressing  Goal: Effective Urinary Elimination  Outcome: Progressing  Goal: Effective Oxygenation and Ventilation  Outcome: Progressing     Patient vital signs are at baseline: Yes  Patient able to ambulate as they were prior to admission or with assist devices provided by  therapies during their stay:  Yes  Patient MUST void prior to discharge:  Yes  Patient able to tolerate oral intake:  Yes  Pain has adequate pain control using Oral analgesics:  Yes  Does patient have an identified :  Yes  Has goal D/C date and time been discussed with patient:  Yes    Pt A & O x 4 & endorses mild to moderate pain to the L. Knee/Leg during today's shift. Utilizing scheduled APAP & PRN Oxycodone w/ effective results. Dressing to surgical incision has a small amount of sanguinous drainage present. CMS intact to BLE. Voiding spontaneously. Up w/ a SBA, walker & gait belt. Saline locked. Tolerating a regular diet. VSS. Pt has completed all PT/OT goals & was ready for discharge today. Per adamant family requests, attempting to find TCU placement. SW has sent requests & anticipating discharge to TCU likely Monday.     JOEL Daniel  Shift: 0700 - 1530

## 2023-06-17 NOTE — PROGRESS NOTES
[facility]  HOSPITALIST CONSULT FOLLOW UP        Summary: 82 year old female admitted for elective left TKA that was done on 6/15/2023.  Post op she has done well though is having concerns regarding discharge home.    Overnight she has no new issues.  Pts disposition will be per SW input regarding  TCU placement.     Problem list:    1.  POD #1   left TKA  2.  History of atrial fibrillation; continue sotolol, eliquis, vitals reviewed  3.  Drug induced coagulation defect:  eliquis restarted  4.  HTN:  Continue thiazide, norvasc,   5.  History of HFpEF: daily lasix; stable  6.  History of gout:  3 times weekly allopurinol      Faiza Dias MD  [facility]  [facility]  [facility]  Phone: #[phone]  Securely message with the Vocera Web Console (learn more here)  Text page via [employer] Paging/Directory     Interval History/Subjective: stable overnight    Physical Exam/Objective:  Temp:  [97.6  F (36.4  C)-97.8  F (36.6  C)] 97.7  F (36.5  C)  Pulse:  [54-65] 65  Resp:  [16] 16  BP: (108-133)/(52-83) 108/83  SpO2:  [92 %-94 %] 92 %  Body mass index is 28.72 kg/m .    GENERAL:  Alert, appears comfortable, in no acute distress, appears stated age   HEAD:  Normocephalic, without obvious abnormality, atraumatic   EYES:  PERRL, conjunctiva/corneas clear, no scleral icterus, EOM's intact   NOSE: Nares normal, septum midline, mucosa normal, no drainage   THROAT: Lips, mucosa, and tongue normal; teeth and gums normal, mouth moist   NECK: Supple, symmetrical, trachea midline   BACK:   Symmetric, no curvature, ROM normal   LUNGS:   Clear to auscultation bilaterally, no rales, rhonchi, or wheezing, symmetric chest rise on inhalation, respirations unlabored   CHEST WALL:  No tenderness or deformity   HEART:  Regular rate and rhythm, S1 and S2 normal, no murmur, rub, or gallop    ABDOMEN:   Soft, non-tender, bowel sounds active all four quadrants, no masses, no organomegaly, no  rebound or guarding   EXTREMITIES: Post op dressing not pulled    SKIN: Dry to touch, no exanthems in the visualized areas   NEURO: Alert, oriented x3, moves all four extremities freely   PSYCH: Cooperative, behavior is appropriate      Data reviewed today: I personally reviewed all new medications, labs, imaging/diagnostics reports over the past 24 hours. Pertinent findings include:    Imaging:   No results found for this or any previous visit (from the past 24 hour(s)).    Labs:  Most Recent 3 BMP's:Recent Labs   Lab Test 06/16/23  0623 05/19/23  1156 03/08/23  1133 12/06/22  1207 06/06/22  1147    151*  --   --  143   POTASSIUM 3.4* 3.2* 3.1*   < > 4.1   CHLORIDE 100 103  --   --  99   CO2 28 28  --   --  31   BUN 13 24.1*  --   --  15   CR 0.75 0.82  --   --  0.66   ANIONGAP 11 20*  --   --  13   SARAI 9.5 9.7  --   --  9.4    93  --   --  98    < > = values in this interval not displayed.       Medications:   Personally Reviewed.  Medications     lactated ringers Stopped (06/17/23 0100)       acetaminophen  975 mg Oral Q8H     [START ON 6/19/2023] allopurinol  100 mg Oral Once per day on Mon Wed Fri     amLODIPine  10 mg Oral Daily     apixaban ANTICOAGULANT  5 mg Oral BID     atorvastatin  40 mg Oral Daily     FLUoxetine  10 mg Oral Daily     furosemide  40 mg Oral Daily     hydrochlorothiazide  12.5 mg Oral Daily     pantoprazole  40 mg Oral Daily     polyethylene glycol  17 g Oral Daily     potassium chloride ER  20 mEq Oral Daily     senna-docusate  1 tablet Oral BID     sodium chloride (PF)  3 mL Intracatheter Q8H     sotalol  80 mg Oral Q12H

## 2023-06-17 NOTE — PLAN OF CARE
Patient had a hard time sleeping overnight. States she just couldn't get comfortable in the bed. BP stable overnight and spot checks of HR ranged from 58-74.    Patient confused on the next phase of her care, she thinks she is heading to a TCU, but she is not sure. According to the notes I have read she is to be discharged home when cleared.

## 2023-06-17 NOTE — PROGRESS NOTES
"ORTHO POD 1    NO NEW ISSUES  /61 (BP Location: Left arm)   Pulse 56   Temp 97.6  F (36.4  C) (Oral)   Resp 16   Ht 1.575 m (5' 2\")   Wt 71.2 kg (157 lb)   SpO2 94%   BMI 28.72 kg/m      DRSG DRY  CMS INTACT    PLAN - PT/OT   NEEDS SW CONSULT - DOESN'T FEEL COMFORTABLE GOING HOME.  "

## 2023-06-17 NOTE — PROGRESS NOTES
Physical Therapy Discharge Summary    Reason for therapy discharge:    All goals and outcomes met, no further needs identified.    Progress towards therapy goal(s). See goals on Care Plan in [facility] electronic health record for goal details.  Goals met    Therapy recommendation(s):    Continue home exercise program.

## 2023-06-17 NOTE — CONSULTS
Care Management Initial Consult    General Information  Assessment completed with: Patient, Family, Pt and granddaughter Wilner  Type of CM/SW Visit: Initial Assessment    Primary Care Provider verified and updated as needed:     Readmission within the last 30 days:        Reason for Consult: discharge planning  Advance Care Planning:            Communication Assessment  Patient's communication style: spoken language (English or Bilingual) (Speak [place])    Hearing Difficulty or Deaf: no        Cognitive  Cognitive/Neuro/Behavioral: WDL                      Living Environment:   People in home: alone     Current living Arrangements: house      Able to return to prior arrangements: yes       Family/Social Support:  Care provided by: self  Provides care for: no one  Marital Status:              Description of Support System: Supportive, Involved         Current Resources:   Patient receiving home care services: No     Community Resources: None  Equipment currently used at home: walker, standard, cane, straight  Supplies currently used at home:      Employment/Financial:  Employment Status:          Financial Concerns: No concerns identified   Referral to Financial Worker: No       Does the patient's insurance plan have a 3 day qualifying hospital stay waiver?  Yes   Will the waiver be used for post-acute placement? TBD  Lifestyle & Psychosocial Needs:  Social Determinants of Health     Tobacco Use: Low Risk  (6/16/2023)    Patient History      Smoking Tobacco Use: Never      Smokeless Tobacco Use: Never      Passive Exposure: Not on file   Alcohol Use: Not on file   Financial Resource Strain: Not on file   Food Insecurity: Not on file   Transportation Needs: Not on file   Physical Activity: Not on file   Stress: Not on file   Social Connections: Not on file   Intimate Partner Violence: Not on file   Depression: Not on file   Housing Stability: Not on file       Functional Status:  Prior to admission patient  "needed assistance:   Dependent ADLs:: Ambulation-cane, Ambulation-walker  Dependent IADLs:: Independent          Additional Information:  SW discussed with charge RN. Noted that PT has indicated pt moving very well, not TCU appropriate. SW met with pt, pts granddaughter Wilner and Wilner'  in pts room for initial assessment. Chart reviewed and  PT/OT notes reviewed for pts baseline information. Pt confirms that she lives in a multi-level home and is independent with no services at baseline. RN present in pt room when SW came in, noting pt medically stable for discharge and moving well. RN stepped out.     Both pt and Wilner express concerns about pt returning home at discharge as she does not have anyone that can provide 24/7 support. SW provided supportive listening and also spent time discussing pts mobility and the qualifications for TCU. SW spent time brainstorming options for support when pt is discharged. Including staying with Wilner and getting support on the stairs if they are concerned about her managing stairs on her own and home care services. After discussion, SW discussed plan for SW to reach out to PT for further information about consult but noted that PT was aware of pts home situation when they saw pt. SW encouraged pt and family to continue to think about how they can provide support to pt so they feel comfortable with her discharge plan.    SW spoke with PT who confirmed that in PT pt demonstrated that per PT pt would be safe to be home alone and navigate stairs. PT reports that pts granddaughter was present throughout the session and they also spent time discussing options for discharge to provide support.    Lisa Cash, [facility]    Addendum: WAN informed by pts RN that pts granddaughter spoke with ortho team who are \"placing an order for TCU.\" SW reviewed note from ortho team in chart indicating that they are in agreement with discharge to TCU for increased therapy and safety for the pt. "     WAN met with pt and granddaughter to discuss. SW provided list of TCU facilities to review for choices. Pt reports that she spoke with [facility] prior to admission about coming there for TCU. SW requested at least four more TCU options from the pt and family. Discussed that the facilities will have to review for qualification from their standpoint. Discussed that it is possible that they will not accept pt based on her mobility at this time. If that were the case we would revisit the discharge to home. Pt and Wilner report understanding. Discussed that TCU may review and feel pt is appropriate for TCU for a period of time as well. Pt and Wilner report understanding. Discussed plan to send referrals and work on placement. Discussed transport and pt and Wilner request [facility] wheelchair transport. Discussed potential for private pay cost and they report understanding and agreement. Pt requests [facility] and they will review for further choices. Wilner phone- [phone]    WAN sent initial referral to [facility] and will follow.  11:47 AM    SW received call from Wilner with six additional choices for TCU- WAN sent these referrals. Wilner notes that they are open to any TCU for the pt that has availability and can accept.  11:55 AM    SW spoke with admissions at [facility]. They report plan to review pt and update SW on ability to accept pt once reviewed. They report that this will likely be tomorrow morning pending their ability to review later today versus tomorrow.  12:15 PM    SW received acceptance of pt to [facility] TCU for tomorrow after 11:00 AM. [facility] 11-11:40 AM. WAN updated pts granddaughter, Wilner, of this information. WAN sent message to TCU informing them of the ride time.  1:23 PM

## 2023-06-17 NOTE — PROGRESS NOTES
I received a call from the patient's granddaughter who doesn't think discharging this patient home is safe for her. She has had multiple falls in the last year and has 3 stories of stairs at home to navigate. Patient's granddaughter doesn't think she can navigate at home on her own with only 2 visits from her during the day. I spoke with Dr. Ruiz who agrees that TCU placement or discharge home with daily nursing care is recommended.

## 2023-06-17 NOTE — PLAN OF CARE
Problem: Knee Arthroplasty  Goal: Optimal Coping  Outcome: Progressing  Goal: Absence of Bleeding  Outcome: Progressing  Goal: Fluid and Electrolyte Balance  Outcome: Progressing  Goal: Optimal Functional Ability  Outcome: Progressing  Intervention: Promote Optimal Functional Status  Recent Flowsheet Documentation  Taken 6/16/2023 1645 by Shelby Malik RN  Assistive Device Utilized:   gait belt   walker  Activity Management: back to bed  Taken 6/16/2023 1638 by Shelby Malik RN  Assistive Device Utilized:   gait belt   walker  Activity Management: ambulated to bathroom  Taken 6/16/2023 1510 by Shelby Malik RN  Assistive Device Utilized:   gait belt   walker  Activity Management: ambulated to bathroom  Taken 6/16/2023 1500 by Shelby Malik RN  Assistive Device Utilized:   gait belt   walker  Activity Management: activity adjusted per tolerance  Taken 6/16/2023 1300 by Shelby Malik RN  Activity Management: activity adjusted per tolerance  Goal: Absence of Infection Signs and Symptoms  Outcome: Progressing  Goal: Intact Neurovascular Status  Outcome: Progressing  Goal: Anesthesia/Sedation Recovery  Outcome: Progressing  Intervention: Optimize Anesthesia Recovery  Recent Flowsheet Documentation  Taken 6/16/2023 1500 by Shelby Malik RN  Safety Promotion/Fall Prevention:   activity supervised   clutter free environment maintained   mobility aid in reach   nonskid shoes/slippers when out of bed   patient and family education   room door open   room near nurse's station   room organization consistent   safety round/check completed   supervised activity   treat reversible contributory factors   treat underlying cause  Taken 6/16/2023 1300 by Shelby Malik RN  Safety Promotion/Fall Prevention:   activity supervised   clutter free environment maintained   mobility aid in reach   nonskid shoes/slippers when out of bed   patient and family education   room door open   room near nurse's station    room organization consistent   safety round/check completed   supervised activity   treat reversible contributory factors   treat underlying cause  Administration (IS):   instruction provided, initial   mouthpiece utilized   proper technique demonstrated   self-administered  Level Incentive Spirometer (mL): 1000  Incentive Spirometer Predicted Level (mL): 1700  Number of Repetitions (IS): 4  Patient Tolerance (IS): good  Goal: Acceptable Pain Control  Outcome: Progressing  Intervention: Prevent or Manage Pain  Recent Flowsheet Documentation  Taken 6/16/2023 2203 by Shelby Malik RN  Pain Management Interventions: distraction  Taken 6/16/2023 2118 by Shelby Malik RN  Pain Management Interventions: medication (see MAR)  Taken 6/16/2023 1948 by Shelby Malik RN  Pain Management Interventions: distraction  Taken 6/16/2023 1904 by Shelby Malik RN  Pain Management Interventions: medication (see MAR)  Taken 6/16/2023 1500 by Shelby Malik RN  Pain Management Interventions: emotional support  Taken 6/16/2023 1346 by Shelby Malik RN  Pain Management Interventions: medication offered but refused  Taken 6/16/2023 1301 by Shelby Malik RN  Pain Management Interventions: medication (see MAR)  Goal: Nausea and Vomiting Relief  Outcome: Progressing  Goal: Effective Urinary Elimination  Outcome: Progressing  Goal: Effective Oxygenation and Ventilation  Outcome: Progressing  Intervention: Optimize Oxygenation and Ventilation  Recent Flowsheet Documentation  Taken 6/16/2023 1645 by Shelby Malik RN  Head of Bed (HOB) Positioning: HOB at 20-30 degrees  Taken 6/16/2023 1500 by Shelby Malik RN  Head of Bed (HOB) Positioning: (Eating) HOB at 30-45 degrees  Taken 6/16/2023 1300 by Shelby Malik RN  Administration (IS):   instruction provided, initial   mouthpiece utilized   proper technique demonstrated   self-administered  Level Incentive Spirometer (mL): 1000  Incentive Spirometer Predicted  Level (mL): 1700  Number of Repetitions (IS): 4  Patient Tolerance (IS): good  Head of Bed (HOB) Positioning: (Eating) HOB at 30-45 degrees     Patient vital signs are at baseline: Yes, able to titrate off O2  Patient able to ambulate as they were prior to admission or with assist devices provided by therapies during their stay:  Yes  Patient MUST void prior to discharge:  Yes  Patient able to tolerate oral intake:  Yes  Pain has adequate pain control using Oral analgesics:  Yes  Does patient have an identified :  Yes  Has goal D/C date and time been discussed with patient:  Yes     Pt arrived to [address] [place] [address] today at 1158. Pt is A & O x 4 & endorses mild to moderate pain to the L. Knee/Leg during today's shift. Utilizing scheduled APAP, PRN Atarax & PRN Oxycodone w/ effective results. Ace dressing to surgical incision is CDI. CMS intact to BLE. Voiding spontaneously. Up w/ an assist of 1, walker & gait belt. LR running at 75 ml. Tolerating a regular diet. VSS; Able to titrate off O2. Anticipating discharge tomorrow pending completion of clinical milestones.      JOEL Daniel  Shift: 2200 - 4440

## 2023-06-17 NOTE — PROGRESS NOTES
Occupational Therapy     06/17/23 0920   Appointment Info   Signing Clinician's Name / Credentials (OT) Krystyna Sanders OT   Quick Adds   Quick Adds Certification   Living Environment   People in Home alone   Current Living Arrangements house   Home Accessibility stairs to enter home;stairs within home   Self-Care   Usual Activity Tolerance good   Current Activity Tolerance moderate   Fall history within last six months yes   Number of times patient has fallen within last six months 2   Activity/Exercise/Self-Care Comment Ind BADLs at baseline   Instrumental Activities of Daily Living (IADL)   IADL Comments Ind all IADLs other than yardwork   General Information   Onset of Illness/Injury or Date of Surgery 06/16/23   Referring Physician Pineda Clay MD   Patient/Family Therapy Goal Statement (OT) improve safety/independence with ADLs and fxl mob   Additional Occupational Profile Info/Pertinent History of Current Problem s/p TKA   Existing Precautions/Restrictions no pivoting or twisting   Right Lower Extremity (Weight-bearing Status) weight-bearing as tolerated (WBAT)   Cognitive Status Examination   Orientation Status orientation to person, place and time   Affect/Mental Status (Cognitive) WFL   Sensory   Sensory Quick Adds sensation intact   Posture   Posture not impaired   Range of Motion Comprehensive   General Range of Motion no range of motion deficits identified  (BUE)   Strength Comprehensive (MMT)   General Manual Muscle Testing (MMT) Assessment no strength deficits identified  (BUE)   Coordination   Upper Extremity Coordination No deficits were identified   Bed Mobility   Bed Mobility sit-supine   Sit-Supine [place] (Bed Mobility) supervision   Transfers   Transfers sit-stand transfer   Sit-Stand Transfer   Sit-Stand [place] (Transfers) supervision   Assistive Device (Sit-Stand Transfers) walker, front-wheeled   Activities of Daily Living   BADL Assessment/Intervention lower body dressing    Lower Body Dressing Assessment/Training   Position (Lower Body Dressing) edge of bed sitting   [place] Level (Lower Body Dressing) minimum assist (75% patient effort)   Clinical Impression   Criteria for Skilled Therapeutic Interventions Met (OT) Yes, treatment indicated   OT Diagnosis Decreased ADL independence   OT Problem List-Impairments impacting ADL problems related to;pain;mobility;flexibility;strength   Assessment of Occupational Performance 3-5 Performance Deficits   Identified Performance Deficits dressing, toileting, bathing, transfers, bed mob   Planned Therapy Interventions (OT) ADL retraining;bed mobility training;transfer training;home program guidelines;progressive activity/exercise   Clinical Decision Making Complexity (OT) moderate complexity   Anticipated Equipment Needs Upon Discharge (OT) dressing equipment;raised toilet seat;other (see comments)  (grab bar toilet, shower; leg )   Risk & Benefits of therapy have been explained evaluation/treatment results reviewed;care plan/treatment goals reviewed;patient  (granddaughter)   OT Total Evaluation Time   OT Eval, Moderate Complexity Minutes (75558) 10   Therapy Certification   Medical Diagnosis s/p TKA   Start of Care Date 06/17/23   Certification date from 06/17/23   Certification date to 07/17/23   OT Goals   Therapy Frequency (OT) Daily   OT Predicted Duration/Target Date for Goal Attainment 06/19/23   OT Goals Lower Body Dressing;Toilet Transfer/Toileting;Bed Mobility   OT: Lower Body Dressing Modified independent;using adaptive equipment   OT: Bed Mobility Modified independent   OT: Toilet Transfer/Toileting Modified independent   Interventions   Interventions Quick Adds Self-Care/Home Management   Self-Care/Home Management   Self-Care/Home Mgmt/ADL, Compensatory, Meal Prep Minutes (13804) 38   Symptoms Noted During/After Treatment (Meal Preparation/Planning Training) increased pain   Treatment Detail/Skilled Intervention Pt met  seated EOB; agreeable to participate in OT, with encouragement. Pt completes LB dressing with Mod I after OT interventions, using AE. Educ on recommended AE and where to purchase. Pt completes UB dressing, Ind. Sit<>stand transfers from EOB, with FWW, mult reps in session, SBA/Mod I. Fxl mob to/from bathroom with FWW, SBA/Mod I. Pt completes toilet transfer from STS with grab bars, Min A; verbal cues. REC toilet riser with grab bars; educ pt and family- verbalized understanding. Walk-in shower transfer with SBA; verbal cues for safe transfer technique. Educ on waiting to attempt showering until someone is able to be at pt's home with her. Pt feeling painful at end of session and wanting to get back to bed. Completes bed mob sit>supine with Mod I after OT intervetions and use of leg . Pt and family feeling overwhelmed about the potential of pt d/c home alone. Educ on home modifications for increased safety with ADLs and fxl mob and suggested possibilities of pt having someone come to stay with her or going to stay at a family member's house for a few days.   OT Discharge Planning   OT Plan 6/19- toilet transfer with RTS; LB dressing AE; car transfer, kitchen mob   OT Discharge Recommendation (DC Rec)   (Defer to ortho team)   OT Rationale for DC Rec Pt completes BADLs SBA/Mod I, with AE. Pt needs a toilet riser with grab bars, prior to d/c home.   OT Brief overview of current status SBA; painful   Total Session Time   Timed Code Treatment Minutes 38   Total Session Time (sum of timed and untimed services) 48     Krystyna Sanders, OT 6/17/2023

## 2023-06-18 ENCOUNTER — APPOINTMENT (OUTPATIENT)
Dept: ULTRASOUND IMAGING | Facility: CLINIC | Age: 83
End: 2023-06-18
Payer: COMMERCIAL

## 2023-06-18 ENCOUNTER — APPOINTMENT (OUTPATIENT)
Dept: OCCUPATIONAL THERAPY | Facility: CLINIC | Age: 83
End: 2023-06-18
Attending: ORTHOPAEDIC SURGERY
Payer: COMMERCIAL

## 2023-06-18 VITALS
DIASTOLIC BLOOD PRESSURE: 59 MMHG | RESPIRATION RATE: 16 BRPM | SYSTOLIC BLOOD PRESSURE: 109 MMHG | HEART RATE: 60 BPM | WEIGHT: 157 LBS | HEIGHT: 62 IN | OXYGEN SATURATION: 92 % | BODY MASS INDEX: 28.89 KG/M2 | TEMPERATURE: 98.1 F

## 2023-06-18 LAB
FASTING STATUS PATIENT QL REPORTED: NO
GLUCOSE BLD-MCNC: 128 MG/DL (ref 70–125)
HGB BLD-MCNC: 11.5 G/DL (ref 11.7–15.7)

## 2023-06-18 PROCEDURE — 36415 COLL VENOUS BLD VENIPUNCTURE: CPT | Performed by: ORTHOPAEDIC SURGERY

## 2023-06-18 PROCEDURE — 97535 SELF CARE MNGMENT TRAINING: CPT | Mod: GO

## 2023-06-18 PROCEDURE — 250N000013 HC RX MED GY IP 250 OP 250 PS 637: Performed by: EMERGENCY MEDICINE

## 2023-06-18 PROCEDURE — 85018 HEMOGLOBIN: CPT | Performed by: ORTHOPAEDIC SURGERY

## 2023-06-18 PROCEDURE — 82947 ASSAY GLUCOSE BLOOD QUANT: CPT | Performed by: ORTHOPAEDIC SURGERY

## 2023-06-18 PROCEDURE — 93971 EXTREMITY STUDY: CPT | Mod: LT

## 2023-06-18 PROCEDURE — 250N000013 HC RX MED GY IP 250 OP 250 PS 637: Performed by: ORTHOPAEDIC SURGERY

## 2023-06-18 RX ORDER — OXYCODONE HYDROCHLORIDE 5 MG/1
2.5-5 TABLET ORAL EVERY 4 HOURS PRN
Qty: 25 TABLET | Refills: 0 | Status: SHIPPED | OUTPATIENT
Start: 2023-06-18 | End: 2023-06-25

## 2023-06-18 RX ADMIN — SENNOSIDES AND DOCUSATE SODIUM 1 TABLET: 50; 8.6 TABLET ORAL at 08:03

## 2023-06-18 RX ADMIN — APIXABAN 5 MG: 5 TABLET, FILM COATED ORAL at 08:03

## 2023-06-18 RX ADMIN — SOTALOL HYDROCHLORIDE 80 MG: 80 TABLET ORAL at 06:43

## 2023-06-18 RX ADMIN — PANTOPRAZOLE SODIUM 40 MG: 40 TABLET, DELAYED RELEASE ORAL at 08:03

## 2023-06-18 RX ADMIN — POLYETHYLENE GLYCOL 3350 17 G: 17 POWDER, FOR SOLUTION ORAL at 08:04

## 2023-06-18 RX ADMIN — POTASSIUM CHLORIDE 20 MEQ: 1500 TABLET, EXTENDED RELEASE ORAL at 08:03

## 2023-06-18 RX ADMIN — FLUOXETINE 10 MG: 10 CAPSULE ORAL at 08:03

## 2023-06-18 RX ADMIN — OXYCODONE HYDROCHLORIDE 10 MG: 5 TABLET ORAL at 00:42

## 2023-06-18 RX ADMIN — ACETAMINOPHEN 975 MG: 325 TABLET ORAL at 06:43

## 2023-06-18 RX ADMIN — ATORVASTATIN CALCIUM 40 MG: 40 TABLET, FILM COATED ORAL at 08:03

## 2023-06-18 RX ADMIN — FUROSEMIDE 40 MG: 40 TABLET ORAL at 08:03

## 2023-06-18 RX ADMIN — OXYCODONE HYDROCHLORIDE 10 MG: 5 TABLET ORAL at 06:44

## 2023-06-18 RX ADMIN — AMLODIPINE BESYLATE 10 MG: 10 TABLET ORAL at 08:03

## 2023-06-18 RX ADMIN — HYDROCHLOROTHIAZIDE 12.5 MG: 12.5 CAPSULE ORAL at 08:03

## 2023-06-18 ASSESSMENT — ACTIVITIES OF DAILY LIVING (ADL)
ADLS_ACUITY_SCORE: 34
ADLS_ACUITY_SCORE: 34
ADLS_ACUITY_SCORE: 36
ADLS_ACUITY_SCORE: 34
ADLS_ACUITY_SCORE: 34
ADLS_ACUITY_SCORE: 36

## 2023-06-18 NOTE — PROGRESS NOTES
Patient vital signs are at baseline: Yes  Patient able to ambulate as they were prior to admission or with assist devices provided by therapies during their stay:  Yes  Patient MUST void prior to discharge:  Yes  Patient able to tolerate oral intake:  Yes  Pain has adequate pain control using Oral analgesics:  Yes  Does patient have an identified :  Yes  Has goal D/C date and time been discussed with patient:  Yes, discharging to [facility] today [phone].

## 2023-06-18 NOTE — DISCHARGE SUMMARY
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 Clay on . There were no intraoperative complications and the patient was transferred to the recovery room and later the orthopedic unit in stable condition. Once the patient reached the orthopedic floor our orthopedic pain protocol was implemented along with the following:    Anticoagulation Medications: Eliquis  Therapy: PT and OT  Activity: WBAT  Bracing: None    Consultations during Admission: Hospitalist service for medical management     COMPLICATIONS/SIGNIFICANT FINDINGS   US on  to evaluate for DVT given new onset calf pain -- negative for DVT at the LLE.    DISCHARGE INFORMATION   Condition at discharge: Good  Discharge destination: TCU  Patient was seen by myself on the date of discharge.    FOLLOW UP CARE   Follow up with orthopedics in 2 weeks or sooner should the need arise. Ortho will continue to manage pain control, post op anticoagulation and incision care.     Follow up with your PCP for management of chronic medical problems and to evaluate for post op medical complications including constipation, nausea/vomiting, DVT/PE, anemia, changes in blood pressure, fevers/chills, urinary retention and atelectasis/pneumonia.     Subjective   Patient is doing well on POD #2. Pain is well controlled with oral medications. Ambulating. Tolerating oral intake. Patient is doing well today. Notes pain is manageable. Voiding without difficulty. No BM but passing gas. Looking forward to  "discharging today around 1100. New onset posterior calf pain overnight.    Physical Exam   /59   Pulse 60   Temp 98.1  F (36.7  C) (Oral)   Resp 16   Ht 1.575 m (5' 2\")   Wt 71.2 kg (157 lb)   SpO2 92%   BMI 28.72 kg/m    The patient is A&Ox3. Appears comfortable.   Sensation is intact.  Dorsiflexion and plantar flexion is intact.  Dorsalis pedis pulse intact.  Calves are soft. Tenderness to palpation at posterior calf.   The incision is covered. Dressing C/D/I.    Pertinent Results at Discharge     Hemoglobin   Date/Time Value Ref Range Status   06/18/2023 07:53 AM 11.5 (L) 11.7 - 15.7 g/dL Final   06/17/2023 10:29 AM 12.4 11.7 - 15.7 g/dL Final   09/12/2018 11:43 PM 12.9 12.0 - 16.0 g/dL Final     INR   Date/Time Value Ref Range Status   04/13/2018 03:04 PM 0.99 0.90 - 1.10 Final     Platelet Count   Date/Time Value Ref Range Status   09/12/2018 11:43  140 - 440 thou/uL Final   08/28/2018 08:01  140 - 440 thou/uL Final   04/15/2018 05:42  140 - 440 thou/uL Final       Problem List   Principal Problem:    Status post joint replacement      DEE MAYFIELD PA-C  [facility]  [phone]  Date: 6/18/2023  Time: 1:41 PM    "

## 2023-06-18 NOTE — PROGRESS NOTES
Patient vital signs are at baseline: No,  Reason:  Required 1 L of O2 via NC overnight to maintain O2 saturation > 90%, denied SOB  Patient able to ambulate as they were prior to admission or with assist devices provided by therapies during their stay:  Yes, up one with gb/walker  Patient MUST void prior to discharge:  Yes  Patient able to tolerate oral intake:  Yes  Pain has adequate pain control using Oral analgesics:  Yes  Does patient have an identified :  Yes  Has goal D/C date and time been discussed with patient:  Yes, TCU 6/18/23    Alert and oriented x 4, able to make needs known, CMS intact, dsg CDI, PIV saline locked, US to LLE completed.

## 2023-06-18 NOTE — PLAN OF CARE
Patient vital signs are at baseline: Yes  Patient able to ambulate as they were prior to admission or with assist devices provided by therapies during their stay:  Yes  Patient MUST void prior to discharge:  Yes  Patient able to tolerate oral intake:  Yes  Pain has adequate pain control using Oral analgesics:  Yes  Does patient have an identified :  Yes  Has goal D/C date and time been discussed with patient:  Yes    Rubio Ferreira RN on 6/17/2023 at 9:11 PM

## 2023-06-18 NOTE — PROGRESS NOTES
"Orthopedic Progress Note      Assessment: 2 Days Post-Op  S/P Procedure(s):  LEFT TOTAL KNEE ARTHROPLASTY @    Plan:   - Continue PT/OT  - Weightbearing status: WBAT LLE  - Anticoagulation:  in addition to SCDs, aruna stockings and early ambulation.  - US of LLE negative for DVT  - Discharge planning: Discharge home pending oral pain control, medicine sign off and therapy clearance. OK from ortho standpoint to discharge.      Subjective:  Pain: mild  Chest pain, SOB: no  Nausea, Vomiting:  no  Lightheadedness, Dizziness:  no  Neuro:  Patient denies new onset numbness or paresthesias    Patient is doing well today. Notes pain is manageable. Voiding without difficulty. No BM but passing gas. Looking forward to discharging today around 1100. New onset posterior calf pain overnight.    Objective:  /88 (BP Location: Left arm)   Pulse 72   Temp 98.1  F (36.7  C) (Oral)   Resp 16   Ht 1.575 m (5' 2\")   Wt 71.2 kg (157 lb)   SpO2 (!) 89%   BMI 28.72 kg/m    The patient is A&Ox3. Appears comfortable.   Sensation is intact.  Dorsiflexion and plantar flexion is intact.  Dorsalis pedis pulse intact.  Calves are soft. Tenderness to palpation at posterior calf.   The incision is covered. Dressing C/D/I.    Pertinent Labs   Lab Results: personally reviewed.   Lab Results   Component Value Date    INR 0.99 04/13/2018     Lab Results   Component Value Date    WBC 8.8 09/12/2018    HGB 12.4 06/17/2023    HCT 39.4 09/12/2018    MCV 85 09/12/2018     09/12/2018     Lab Results   Component Value Date     06/16/2023    CO2 28 06/16/2023         Report completed by:  DEE MAYFIELD PA-C  [facility]    Date: 6/18/2023  Time: 7:07 AM    "

## 2023-06-18 NOTE — PROGRESS NOTES
Occupational Therapy Discharge Summary    Reason for therapy discharge:    Discharged to transitional care facility.    Progress towards therapy goal(s). See goals on Care Plan in [facility] electronic health record for goal details.  Goals partially met.  Barriers to achieving goals:   discharge from facility.    Therapy recommendation(s):    Continued therapy is recommended.  Rationale/Recommendations:  Further OT at TCU to promote increased safety/independence with ADLs.    Krystyna Sanders, OT 6/18/2023

## 2023-06-18 NOTE — PROGRESS NOTES
Care Management Discharge Note    Discharge Date: 06/18/2023       Discharge Disposition: Transitional Care    Discharge Services: None    Discharge DME: None    Discharge Transportation: health plan transportation    Private pay costs discussed: Not applicable    Does the patient's insurance plan have a 3 day qualifying hospital stay waiver?  Yes   Will the waiver be used for post-acute placement? Yes    PAS Confirmation Code:  (Not required for this facility)  Patient/family educated on Medicare website which has current facility and service quality ratings: yes    Education Provided on the Discharge Plan: Yes  Persons Notified of Discharge Plans: WAN notified pt and daughter yesterday. Charge RN, providers and facility updated of time today  Patient/Family in Agreement with the Plan: yes    Handoff Referral Completed: Yes    Additional Information:  WAN reviewed plan with charge RN [facility] via [facility] wheelchair transport. SW notified hospitalist, charge RN notified ortho provider. WAN spoke with admissions at the facility who report understanding of ride time. They request orders be sent to [phone]. WAN provided them with phone number for nurse to nurse report.        DANIA Rahman

## 2023-06-19 ENCOUNTER — TRANSITIONAL CARE UNIT VISIT (OUTPATIENT)
Dept: GERIATRICS | Facility: CLINIC | Age: 83
End: 2023-06-19
Payer: COMMERCIAL

## 2023-06-19 VITALS
WEIGHT: 167.3 LBS | SYSTOLIC BLOOD PRESSURE: 112 MMHG | DIASTOLIC BLOOD PRESSURE: 68 MMHG | BODY MASS INDEX: 30.79 KG/M2 | HEIGHT: 62 IN | HEART RATE: 82 BPM | RESPIRATION RATE: 16 BRPM | TEMPERATURE: 97.5 F | OXYGEN SATURATION: 90 %

## 2023-06-19 DIAGNOSIS — I48.0 PAROXYSMAL A-FIB (H): ICD-10-CM

## 2023-06-19 DIAGNOSIS — D64.9 ANEMIA, UNSPECIFIED TYPE: ICD-10-CM

## 2023-06-19 DIAGNOSIS — M15.0 PRIMARY OSTEOARTHRITIS INVOLVING MULTIPLE JOINTS: ICD-10-CM

## 2023-06-19 DIAGNOSIS — Z96.652 STATUS POST TOTAL LEFT KNEE REPLACEMENT: Primary | ICD-10-CM

## 2023-06-19 DIAGNOSIS — R53.81 PHYSICAL DECONDITIONING: ICD-10-CM

## 2023-06-19 DIAGNOSIS — I48.0 PAROXYSMAL ATRIAL FIBRILLATION (H): ICD-10-CM

## 2023-06-19 DIAGNOSIS — E87.6 HYPOKALEMIA: ICD-10-CM

## 2023-06-19 DIAGNOSIS — I10 ESSENTIAL HYPERTENSION: Chronic | ICD-10-CM

## 2023-06-19 DIAGNOSIS — Z74.09 IMPAIRED MOBILITY AND ACTIVITIES OF DAILY LIVING: ICD-10-CM

## 2023-06-19 DIAGNOSIS — Z78.9 IMPAIRED MOBILITY AND ACTIVITIES OF DAILY LIVING: ICD-10-CM

## 2023-06-19 DIAGNOSIS — I50.32 CHRONIC HEART FAILURE WITH PRESERVED EJECTION FRACTION (H): ICD-10-CM

## 2023-06-19 PROCEDURE — 99310 SBSQ NF CARE HIGH MDM 45: CPT | Performed by: PHYSICIAN ASSISTANT

## 2023-06-19 NOTE — LETTER
6/19/2023        RE: Vanessa Matthew  1975 [address]   [facility] [address]        [facility]    PRIMARY CARE PROVIDER AND CLINIC:  Historical Provider, None  Chief Complaint   Patient presents with     Hospital F/U      [place] Medical Record Number:  [id number]  Place of Service where encounter took place:  [facility] ([facility]) [[id number]]    Vanessa Matthew  is a 82 year old  (1940), admitted to the above facility from  [facility]. Hospital stay 6/16/23 through 6/18/23..   HPI:    Patient is a 83yo female with PMHx paroxysmal afib on chronic AC with apixaban, HFpEF, HTN, Gout, OA of left knee who underwent previously scheduled left TKA on 6/16/23. Surgery was uncomplicated, postoperatively patient with mild ABL anemia not requiring transfusion. Due to concerns of returning home where she lives alone, therapy recommended TCU. She is admitted to [facility] for ongoing rehab, medical management.    Patient is seen as initial visit. She is resting in bed, just finished a physical therapy session. States she is having some pain and discomfort, asking for pain medication. Reports it is effective. Denies constipation, sob, cp. No acute concerns at this time. Does note she lives alone in [place]. Closest family is 1.5h away.    CODE STATUS/ADVANCE DIRECTIVES DISCUSSION:  Full Code      ALLERGIES:   Allergies   Allergen Reactions     Aspirin      Atorvastatin Unknown     numbness     Pravastatin Unknown     numbness     Iodine Rash      PAST MEDICAL HISTORY:   Past Medical History:   Diagnosis Date     Acute CHF (H) 04/13/2018     Antiplatelet or antithrombotic long-term use      Atrial fibrillation (H)      Essential hypertension      Essential hypertension      Gout      HLD (hyperlipidemia)      Irregular heart beat      Macular degeneration      Primary osteoarthritis involving multiple joints      RBBB       PAST SURGICAL HISTORY:   has a past surgical  history that includes Hysterectomy; Oophorectomy; Total Knee Arthroplasty (Right, 07/01/2016); Eye surgery; Mammoplasty reduction (Bilateral); Breast Cyst Aspiration (2001); and Arthroplasty knee (Left, 6/16/2023).  FAMILY HISTORY: family history includes Cerebrovascular Disease in her mother; Colon Cancer (age of onset: 53.00) in her brother; Heart Disease in her father; Lung Cancer (age of onset: 72.00) in her brother; No Known Problems in her sister.  SOCIAL HISTORY:   reports that she has never smoked. She has never used smokeless tobacco. She reports that she does not currently use alcohol. She reports that she does not use drugs.  Patient's living condition: lives alone    Post Discharge Medication Reconciliation Status:   MED REC REQUIRED  Post Medication Reconciliation Status: discharge medications reconciled and changed, per note/orders       Current Outpatient Medications   Medication Sig     acetaminophen (TYLENOL) 500 MG tablet [ACETAMINOPHEN (TYLENOL) 500 MG TABLET] Take 1,000 mg by mouth every 6 (six) hours as needed for pain.     allopurinol (ZYLOPRIM) 100 MG tablet Take 100 mg by mouth three times a week MWF     amLODIPine (NORVASC) 10 MG tablet Take 1 tablet (10 mg) by mouth daily     amoxicillin (AMOXIL) 500 MG capsule [AMOXICILLIN (AMOXIL) 500 MG CAPSULE] daily as needed (With dental appointments).      atorvastatin (LIPITOR) 40 MG tablet Take 40 mg by mouth daily     calcium carbonate (OS-SARAI) 600 mg (1,500 mg) tablet [CALCIUM CARBONATE (OS-SARAI) 600 MG (1,500 MG) TABLET] Take 600 mg by mouth daily.     carboxymethylcellulose PF (REFRESH PLUS) 0.5 % ophthalmic solution Place 1 drop into both eyes 3 times daily as needed for dry eyes     ELIQUIS ANTICOAGULANT 5 MG tablet Take 1 tablet (5 mg) by mouth 2 times daily     FLUoxetine (PROZAC) 10 MG capsule [FLUOXETINE (PROZAC) 10 MG CAPSULE] Take 10 mg by mouth daily.     furosemide (LASIX) 40 MG tablet Take 40 mg by mouth daily     hydrochlorothiazide  "(MICROZIDE) 12.5 MG capsule Take 12.5 mg by mouth daily     omeprazole (PRILOSEC) 20 MG DR capsule 1 capsule 30 minutes before morning meal Orally Once a day for 90 day(s)     oxyCODONE (ROXICODONE) 5 MG tablet Take 0.5-1 tablets (2.5-5 mg) by mouth every 4 hours as needed for moderate to severe pain (If rates pain over 5/10)     potassium chloride ER (KLOR-CON M) 20 MEQ CR tablet Take 20 mEq by mouth daily     SENNA-docusate sodium (SENNA S) 8.6-50 MG tablet Take 1 tablet by mouth At Bedtime     sotalol (BETAPACE) 80 MG tablet Take 1 tablet (80 mg) by mouth every 12 hours     No current facility-administered medications for this visit.       ROS:  4 point ROS including Respiratory, CV, GI and , other than that noted in the HPI,  is negative    Vitals:  /68   Pulse 82   Temp 97.5  F (36.4  C)   Resp 16   Ht 1.575 m (5' 2\")   Wt 75.9 kg (167 lb 4.8 oz)   SpO2 90%   PF 62 L/min   BMI 30.60 kg/m    Exam:  GEN: well-developed, well-nourished, appears comfortable  HEENT: NCAT, EOM intact bilaterally, sclera clear, conjunctiva normal, nose & mouth patent, mucous membranes moist  CHEST: lungs CTA bilaterally, no increased work of breathing, no wheeze, crackles, rhonchi  HEART: RRR, S1 & S2, +systolic murmur  ABD: soft, nontender, nondistended, no guarding or rigidity  MSK: AROM bilateral UE/LE, L knee surgical incision with dressing, no surrounding erythema, mild edema; pedal & radial pulses 2+ bilaterally  NEURO: awake, alert, oriented to name, place, and time. CN II-XII grossly intact. Sensation grossly intact to light touch.   SKIN: warm & dry without rash, trace pedal edema      Lab/Diagnostic data:    Most Recent 3 CBC's:Recent Labs   Lab Test 06/18/23  0753 06/17/23  1029 09/12/18  2343 08/28/18  2001 04/15/18  1742   WBC  --   --  8.8 9.0 8.9   HGB 11.5* 12.4 12.9 13.6 15.2   MCV  --   --  85 85 91   PLT  --   --  310 318 291     Most Recent 3 BMP's:Recent Labs   Lab Test 06/18/23  0753 " 06/16/23  0623 05/19/23  1156 03/08/23  1133 12/06/22  1207 06/06/22  1147   NA  --  139 151*  --   --  143   POTASSIUM  --  3.4* 3.2* 3.1*   < > 4.1   CHLORIDE  --  100 103  --   --  99   CO2  --  28 28  --   --  31   BUN  --  13 24.1*  --   --  15   CR  --  0.75 0.82  --   --  0.66   ANIONGAP  --  11 20*  --   --  13   SARAI  --  9.5 9.7  --   --  9.4   * 106 93  --   --  98    < > = values in this interval not displayed.          ASSESSMENT/PLAN:    OA of L knee s/p TKA 6/16/23  Physical deconditioning  Impaired mobility and ADLs  Generalized muscle weakness  Uncomplicated surgery. Pain controlled.  -WBAT to LLE  -DVT prophylaxis with PTA apixaban  -Pain control with scheduled tylenol, PRN oxycodone 2.5-5mg q4h  -Bowel regimen while on narcotics  -PT/OT evaluations  -SW for discharge planning    Anemia  Hgb 12.4--11.5.  -Hgb on 6/21 to ensure stability    Hypokalemia  Noted while inpatient, replaced per protocol.   -Continues on chronic KCl 20mEq daily  -BMP on 6/21    HFpEF  HTN / HLD  Mild peripheral edema today. Not in acute exacerbation.  -Daily weights  -Continues on hydrochlorothiazide 12.5mg daily, norvasc 10mg daily, lasix 40mg daily    Paroxysmal afib  -Continues on sotalol 80mg BID, apixaban    Gout  Chronic.  -Allopurinol 3x weekly    Orders:  -CBC, BMP on 6/21    Total time spent with patient visit was 45 min including patient visit and review of past records.     Electronically signed by:  Rodney Hernandez PA-C                       Sincerely,        Rodney Hernandez PA-C

## 2023-06-19 NOTE — PROGRESS NOTES
[facility]IATR[facility]    PRIMARY CARE PROVIDER AND CLINIC:  Historical Provider, None  Chief Complaint   Patient presents with     Hospital F/U      [place] Medical Record Number:  [id number]  Place of Service where encounter took place:  [facility] ([facility]) [[id number]]    Vanessa Matthew  is a 82 year old  (1940), admitted to the above facility from  [facility]. Hospital stay 6/16/23 through 6/18/23..   HPI:    Patient is a 81yo female with PMHx paroxysmal afib on chronic AC with apixaban, HFpEF, HTN, Gout, OA of left knee who underwent previously scheduled left TKA on 6/16/23. Surgery was uncomplicated, postoperatively patient with mild ABL anemia not requiring transfusion. Due to concerns of returning home where she lives alone, therapy recommended TCU. She is admitted to [facility] for ongoing rehab, medical management.    Patient is seen as initial visit. She is resting in bed, just finished a physical therapy session. States she is having some pain and discomfort, asking for pain medication. Reports it is effective. Denies constipation, sob, cp. No acute concerns at this time. Does note she lives alone in [place]. Closest family is 1.5h away.    CODE STATUS/ADVANCE DIRECTIVES DISCUSSION:  Full Code      ALLERGIES:   Allergies   Allergen Reactions     Aspirin      Atorvastatin Unknown     numbness     Pravastatin Unknown     numbness     Iodine Rash      PAST MEDICAL HISTORY:   Past Medical History:   Diagnosis Date     Acute CHF (H) 04/13/2018     Antiplatelet or antithrombotic long-term use      Atrial fibrillation (H)      Essential hypertension      Essential hypertension      Gout      HLD (hyperlipidemia)      Irregular heart beat      Macular degeneration      Primary osteoarthritis involving multiple joints      RBBB       PAST SURGICAL HISTORY:   has a past surgical history that includes Hysterectomy; Oophorectomy; Total Knee Arthroplasty (Right, 07/01/2016);  Eye surgery; Mammoplasty reduction (Bilateral); Breast Cyst Aspiration (2001); and Arthroplasty knee (Left, 6/16/2023).  FAMILY HISTORY: family history includes Cerebrovascular Disease in her mother; Colon Cancer (age of onset: 53.00) in her brother; Heart Disease in her father; Lung Cancer (age of onset: 72.00) in her brother; No Known Problems in her sister.  SOCIAL HISTORY:   reports that she has never smoked. She has never used smokeless tobacco. She reports that she does not currently use alcohol. She reports that she does not use drugs.  Patient's living condition: lives alone    Post Discharge Medication Reconciliation Status:   MED REC REQUIRED  Post Medication Reconciliation Status: discharge medications reconciled and changed, per note/orders       Current Outpatient Medications   Medication Sig     acetaminophen (TYLENOL) 500 MG tablet [ACETAMINOPHEN (TYLENOL) 500 MG TABLET] Take 1,000 mg by mouth every 6 (six) hours as needed for pain.     allopurinol (ZYLOPRIM) 100 MG tablet Take 100 mg by mouth three times a week MW     amLODIPine (NORVASC) 10 MG tablet Take 1 tablet (10 mg) by mouth daily     amoxicillin (AMOXIL) 500 MG capsule [AMOXICILLIN (AMOXIL) 500 MG CAPSULE] daily as needed (With dental appointments).      atorvastatin (LIPITOR) 40 MG tablet Take 40 mg by mouth daily     calcium carbonate (OS-SARAI) 600 mg (1,500 mg) tablet [CALCIUM CARBONATE (OS-SARAI) 600 MG (1,500 MG) TABLET] Take 600 mg by mouth daily.     carboxymethylcellulose PF (REFRESH PLUS) 0.5 % ophthalmic solution Place 1 drop into both eyes 3 times daily as needed for dry eyes     ELIQUIS ANTICOAGULANT 5 MG tablet Take 1 tablet (5 mg) by mouth 2 times daily     FLUoxetine (PROZAC) 10 MG capsule [FLUOXETINE (PROZAC) 10 MG CAPSULE] Take 10 mg by mouth daily.     furosemide (LASIX) 40 MG tablet Take 40 mg by mouth daily     hydrochlorothiazide (MICROZIDE) 12.5 MG capsule Take 12.5 mg by mouth daily     omeprazole (PRILOSEC) 20 MG DR  "capsule 1 capsule 30 minutes before morning meal Orally Once a day for 90 day(s)     oxyCODONE (ROXICODONE) 5 MG tablet Take 0.5-1 tablets (2.5-5 mg) by mouth every 4 hours as needed for moderate to severe pain (If rates pain over 5/10)     potassium chloride ER (KLOR-CON M) 20 MEQ CR tablet Take 20 mEq by mouth daily     SENNA-docusate sodium (SENNA S) 8.6-50 MG tablet Take 1 tablet by mouth At Bedtime     sotalol (BETAPACE) 80 MG tablet Take 1 tablet (80 mg) by mouth every 12 hours     No current facility-administered medications for this visit.       ROS:  4 point ROS including Respiratory, CV, GI and , other than that noted in the HPI,  is negative    Vitals:  /68   Pulse 82   Temp 97.5  F (36.4  C)   Resp 16   Ht 1.575 m (5' 2\")   Wt 75.9 kg (167 lb 4.8 oz)   SpO2 90%   PF 62 L/min   BMI 30.60 kg/m    Exam:  GEN: well-developed, well-nourished, appears comfortable  HEENT: NCAT, EOM intact bilaterally, sclera clear, conjunctiva normal, nose & mouth patent, mucous membranes moist  CHEST: lungs CTA bilaterally, no increased work of breathing, no wheeze, crackles, rhonchi  HEART: RRR, S1 & S2, +systolic murmur  ABD: soft, nontender, nondistended, no guarding or rigidity  MSK: AROM bilateral UE/LE, L knee surgical incision with dressing, no surrounding erythema, mild edema; pedal & radial pulses 2+ bilaterally  NEURO: awake, alert, oriented to name, place, and time. CN II-XII grossly intact. Sensation grossly intact to light touch.   SKIN: warm & dry without rash, trace pedal edema      Lab/Diagnostic data:    Most Recent 3 CBC's:Recent Labs   Lab Test 06/18/23  0753 06/17/23  1029 09/12/18  2343 08/28/18  2001 04/15/18  1742   WBC  --   --  8.8 9.0 8.9   HGB 11.5* 12.4 12.9 13.6 15.2   MCV  --   --  85 85 91   PLT  --   --  310 318 291     Most Recent 3 BMP's:Recent Labs   Lab Test 06/18/23  0753 06/16/23  0623 05/19/23  1156 03/08/23  1133 12/06/22  1207 06/06/22  1147   NA  --  139 151*  --   --  " 143   POTASSIUM  --  3.4* 3.2* 3.1*   < > 4.1   CHLORIDE  --  100 103  --   --  99   CO2  --  28 28  --   --  31   BUN  --  13 24.1*  --   --  15   CR  --  0.75 0.82  --   --  0.66   ANIONGAP  --  11 20*  --   --  13   SARAI  --  9.5 9.7  --   --  9.4   * 106 93  --   --  98    < > = values in this interval not displayed.          ASSESSMENT/PLAN:    OA of L knee s/p TKA 6/16/23  Physical deconditioning  Impaired mobility and ADLs  Generalized muscle weakness  Uncomplicated surgery. Pain controlled.  -WBAT to LLE  -DVT prophylaxis with PTA apixaban  -Pain control with scheduled tylenol, PRN oxycodone 2.5-5mg q4h  -Bowel regimen while on narcotics  -PT/OT evaluations  -SW for discharge planning    Anemia  Hgb 12.4--11.5.  -Hgb on 6/21 to ensure stability    Hypokalemia  Noted while inpatient, replaced per protocol.   -Continues on chronic KCl 20mEq daily  -BMP on 6/21    HFpEF  HTN / HLD  Mild peripheral edema today. Not in acute exacerbation.  -Daily weights  -Continues on hydrochlorothiazide 12.5mg daily, norvasc 10mg daily, lasix 40mg daily    Paroxysmal afib  -Continues on sotalol 80mg BID, apixaban    Gout  Chronic.  -Allopurinol 3x weekly    Orders:  -CBC, BMP on 6/21    Total time spent with patient visit was 45 min including patient visit and review of past records.     Electronically signed by:  Rodney Hernandez PA-C

## 2023-06-20 ENCOUNTER — LAB REQUISITION (OUTPATIENT)
Dept: LAB | Facility: CLINIC | Age: 83
End: 2023-06-20
Payer: COMMERCIAL

## 2023-06-20 DIAGNOSIS — D64.9 ANEMIA, UNSPECIFIED: ICD-10-CM

## 2023-06-21 LAB
ANION GAP SERPL CALCULATED.3IONS-SCNC: 13 MMOL/L (ref 7–15)
BUN SERPL-MCNC: 19.7 MG/DL (ref 8–23)
CALCIUM SERPL-MCNC: 9.1 MG/DL (ref 8.8–10.2)
CHLORIDE SERPL-SCNC: 98 MMOL/L (ref 98–107)
CREAT SERPL-MCNC: 0.73 MG/DL (ref 0.51–0.95)
DEPRECATED HCO3 PLAS-SCNC: 31 MMOL/L (ref 22–29)
GFR SERPL CREATININE-BSD FRML MDRD: 83 ML/MIN/1.73M2
GLUCOSE SERPL-MCNC: 110 MG/DL (ref 70–99)
HGB BLD-MCNC: 11.5 G/DL (ref 11.7–15.7)
HOLD SPECIMEN: NORMAL
POTASSIUM SERPL-SCNC: 3.4 MMOL/L (ref 3.4–5.3)
SODIUM SERPL-SCNC: 142 MMOL/L (ref 136–145)

## 2023-06-21 PROCEDURE — 85018 HEMOGLOBIN: CPT | Mod: ORL | Performed by: PHYSICIAN ASSISTANT

## 2023-06-21 PROCEDURE — 80048 BASIC METABOLIC PNL TOTAL CA: CPT | Mod: ORL | Performed by: PHYSICIAN ASSISTANT

## 2023-06-21 PROCEDURE — P9603 ONE-WAY ALLOW PRORATED MILES: HCPCS | Mod: ORL | Performed by: PHYSICIAN ASSISTANT

## 2023-06-21 PROCEDURE — 36415 COLL VENOUS BLD VENIPUNCTURE: CPT | Mod: ORL | Performed by: PHYSICIAN ASSISTANT

## 2023-06-23 ENCOUNTER — TRANSITIONAL CARE UNIT VISIT (OUTPATIENT)
Dept: GERIATRICS | Facility: CLINIC | Age: 83
End: 2023-06-23
Payer: COMMERCIAL

## 2023-06-23 VITALS
OXYGEN SATURATION: 94 % | HEART RATE: 69 BPM | RESPIRATION RATE: 16 BRPM | TEMPERATURE: 97 F | DIASTOLIC BLOOD PRESSURE: 56 MMHG | SYSTOLIC BLOOD PRESSURE: 105 MMHG | WEIGHT: 159.6 LBS | HEIGHT: 62 IN | BODY MASS INDEX: 29.37 KG/M2

## 2023-06-23 DIAGNOSIS — Z74.09 IMPAIRED MOBILITY AND ACTIVITIES OF DAILY LIVING: ICD-10-CM

## 2023-06-23 DIAGNOSIS — R53.81 PHYSICAL DECONDITIONING: ICD-10-CM

## 2023-06-23 DIAGNOSIS — M15.0 PRIMARY OSTEOARTHRITIS INVOLVING MULTIPLE JOINTS: ICD-10-CM

## 2023-06-23 DIAGNOSIS — I10 ESSENTIAL HYPERTENSION: ICD-10-CM

## 2023-06-23 DIAGNOSIS — I48.0 PAROXYSMAL ATRIAL FIBRILLATION (H): ICD-10-CM

## 2023-06-23 DIAGNOSIS — Z96.652 STATUS POST TOTAL LEFT KNEE REPLACEMENT: Primary | ICD-10-CM

## 2023-06-23 DIAGNOSIS — Z78.9 IMPAIRED MOBILITY AND ACTIVITIES OF DAILY LIVING: ICD-10-CM

## 2023-06-23 DIAGNOSIS — I50.32 CHRONIC HEART FAILURE WITH PRESERVED EJECTION FRACTION (H): ICD-10-CM

## 2023-06-23 PROCEDURE — 99309 SBSQ NF CARE MODERATE MDM 30: CPT | Performed by: PHYSICIAN ASSISTANT

## 2023-06-23 NOTE — LETTER
"    6/23/2023        RE: Vanessa Matthew  [address] [place] [address]        [facility]    No chief complaint on file.    HPI:  Vanessa Matthew is a 82 year old  (1940), who is being seen today for an episodic care visit at: [facility] ([facility]) [[id number]].     Brief summary: Patient is a 81yo female with PMHx paroxysmal afib on chronic AC with apixaban, HFpEF, HTN, Gout, OA of left knee who underwent previously scheduled left TKA on 6/16/23. Surgery was uncomplicated, postoperatively patient with mild ABL anemia not requiring transfusion. Due to concerns of returning home where she lives alone, therapy recommended TCU. She is admitted to [facility] for ongoing rehab, medical management.    Patient is seen in follow-up. She is resting in bed, just finished with therapy, reports she is experiencing an increased amount of pain. Does not feel the pain medication is helping.    Allergies, and PMH/PSH reviewed in EPIC today.  REVIEW OF SYSTEMS:  4 point ROS including Respiratory, CV, GI and , other than that noted in the HPI,  is negative    Objective:   /56   Pulse 69   Temp 97  F (36.1  C)   Resp 16   Ht 1.575 m (5' 2\")   Wt 72.4 kg (159 lb 9.6 oz)   SpO2 94%   BMI 29.19 kg/m      GEN: well-developed, well-nourished, appears uncomfortable  HEENT: NCAT, EOM intact bilaterally, sclera clear, conjunctiva normal, nose & mouth patent, mucous membranes moist  CHEST: lungs CTA bilaterally, no increased work of breathing, no wheeze, crackles, rhonchi  HEART: RRR, S1 & S2, +systolic murmur  ABD: soft, nontender, nondistended, no guarding or rigidity  MSK: AROM bilateral UE/LE, L knee surgical incision with dressing, no surrounding erythema, mild edema; pedal & radial pulses 2+ bilaterally  NEURO: awake, alert, oriented to name, place, and time. CN II-XII grossly intact. Sensation grossly intact to light touch.   SKIN: warm & dry without rash, trace pedal edema      Most Recent 3 " CBC's:  Recent Labs   Lab Test 06/21/23  0810 06/18/23  0753 06/17/23  1029 09/12/18  2343 08/28/18  2001 04/15/18  1742   WBC  --   --   --  8.8 9.0 8.9   HGB 11.5* 11.5* 12.4 12.9 13.6 15.2   MCV  --   --   --  85 85 91   PLT  --   --   --  310 318 291     Most Recent 3 BMP's:  Recent Labs   Lab Test 06/21/23  0810 06/18/23  0753 06/16/23 0623 05/19/23  1156     --  139 151*   POTASSIUM 3.4  --  3.4* 3.2*   CHLORIDE 98  --  100 103   CO2 31*  --  28 28   BUN 19.7  --  13 24.1*   CR 0.73  --  0.75 0.82   ANIONGAP 13  --  11 20*   SARAI 9.1  --  9.5 9.7   * 128* 106 93       Assessment/Plan:    OA of L knee s/p TKA 6/16/23  Physical deconditioning  Impaired mobility and ADLs  Generalized muscle weakness  Uncomplicated surgery. Pain not controlled.  -WBAT to LLE  -DVT prophylaxis with PTA apixaban  -Change tylenol to scheduled 1000mg TID  -Increase oxycodone to 5-10mg q4h PRN  -Bowel regimen while on narcotics  -PT/OT to continue  -SW for discharge planning     Anemia  Hgb 12.4--11.5. stable on repeat.     Hypokalemia  Noted while inpatient, replaced per protocol. Stable in follow-up.  -Continues on chronic KCl 20mEq daily     HFpEF  HTN / HLD  Not in acute exacerbation. No edema.   -Daily weights  -Continues on hydrochlorothiazide 12.5mg daily, norvasc 10mg daily, lasix 40mg daily     Paroxysmal afib  -Continues on sotalol 80mg BID, apixaban     Gout  Chronic.  -Allopurinol 3x weekly    MED REC REQUIRED  Post Medication Reconciliation Status: discharge medications reconciled and changed, per note/orders      Orders:  -Change tylenol to scheduled 1000mg TID  -Increase oxycodone to 5-10mg q4h PRN    Electronically signed by: Rodney Hernandez PA-C             Sincerely,        Rodney Hernandez PA-C

## 2023-06-23 NOTE — PROGRESS NOTES
"[facility] GERIATRICS    No chief complaint on file.    HPI:  Vanessa Matthew is a 82 year old  (1940), who is being seen today for an episodic care visit at: [facility] ([facility]) [[id number]].     Brief summary: Patient is a 83yo female with PMHx paroxysmal afib on chronic AC with apixaban, HFpEF, HTN, Gout, OA of left knee who underwent previously scheduled left TKA on 6/16/23. Surgery was uncomplicated, postoperatively patient with mild ABL anemia not requiring transfusion. Due to concerns of returning home where she lives alone, therapy recommended TCU. She is admitted to [facility] for ongoing rehab, medical management.    Patient is seen in follow-up. She is resting in bed, just finished with therapy, reports she is experiencing an increased amount of pain. Does not feel the pain medication is helping.    Allergies, and PMH/PSH reviewed in EPIC today.  REVIEW OF SYSTEMS:  4 point ROS including Respiratory, CV, GI and , other than that noted in the HPI,  is negative    Objective:   /56   Pulse 69   Temp 97  F (36.1  C)   Resp 16   Ht 1.575 m (5' 2\")   Wt 72.4 kg (159 lb 9.6 oz)   SpO2 94%   BMI 29.19 kg/m      GEN: well-developed, well-nourished, appears uncomfortable  HEENT: NCAT, EOM intact bilaterally, sclera clear, conjunctiva normal, nose & mouth patent, mucous membranes moist  CHEST: lungs CTA bilaterally, no increased work of breathing, no wheeze, crackles, rhonchi  HEART: RRR, S1 & S2, +systolic murmur  ABD: soft, nontender, nondistended, no guarding or rigidity  MSK: AROM bilateral UE/LE, L knee surgical incision with dressing, no surrounding erythema, mild edema; pedal & radial pulses 2+ bilaterally  NEURO: awake, alert, oriented to name, place, and time. CN II-XII grossly intact. Sensation grossly intact to light touch.   SKIN: warm & dry without rash, trace pedal edema      Most Recent 3 CBC's:  Recent Labs   Lab Test 06/21/23  0810 06/18/23  0753 06/17/23  1029 09/12/18  2343 " 08/28/18  2001 04/15/18  1742   WBC  --   --   --  8.8 9.0 8.9   HGB 11.5* 11.5* 12.4 12.9 13.6 15.2   MCV  --   --   --  85 85 91   PLT  --   --   --  310 318 291     Most Recent 3 BMP's:  Recent Labs   Lab Test 06/21/23  0810 06/18/23  0753 06/16/23  0623 05/19/23  1156     --  139 151*   POTASSIUM 3.4  --  3.4* 3.2*   CHLORIDE 98  --  100 103   CO2 31*  --  28 28   BUN 19.7  --  13 24.1*   CR 0.73  --  0.75 0.82   ANIONGAP 13  --  11 20*   SARAI 9.1  --  9.5 9.7   * 128* 106 93       Assessment/Plan:    OA of L knee s/p TKA 6/16/23  Physical deconditioning  Impaired mobility and ADLs  Generalized muscle weakness  Uncomplicated surgery. Pain not controlled.  -WBAT to LLE  -DVT prophylaxis with PTA apixaban  -Change tylenol to scheduled 1000mg TID  -Increase oxycodone to 5-10mg q4h PRN  -Bowel regimen while on narcotics  -PT/OT to continue  -SW for discharge planning     Anemia  Hgb 12.4--11.5. stable on repeat.     Hypokalemia  Noted while inpatient, replaced per protocol. Stable in follow-up.  -Continues on chronic KCl 20mEq daily     HFpEF  HTN / HLD  Not in acute exacerbation. No edema.   -Daily weights  -Continues on hydrochlorothiazide 12.5mg daily, norvasc 10mg daily, lasix 40mg daily     Paroxysmal afib  -Continues on sotalol 80mg BID, apixaban     Gout  Chronic.  -Allopurinol 3x weekly    MED REC REQUIRED  Post Medication Reconciliation Status: discharge medications reconciled and changed, per note/orders      Orders:  -Change tylenol to scheduled 1000mg TID  -Increase oxycodone to 5-10mg q4h PRN    Electronically signed by: Rodney Hernandez PA-C

## 2023-06-24 ENCOUNTER — TELEPHONE (OUTPATIENT)
Dept: GERIATRICS | Facility: CLINIC | Age: 83
End: 2023-06-24
Payer: COMMERCIAL

## 2023-06-25 DIAGNOSIS — Z96.652 S/P TOTAL KNEE ARTHROPLASTY, LEFT: ICD-10-CM

## 2023-06-25 RX ORDER — OXYCODONE HYDROCHLORIDE 5 MG/1
2.5-5 TABLET ORAL EVERY 4 HOURS PRN
Qty: 8 TABLET | Refills: 0 | Status: SHIPPED | OUTPATIENT
Start: 2023-06-25 | End: 2023-06-26

## 2023-06-25 NOTE — PROGRESS NOTES
Staff paged today requesting refill for new TCU admission, for Oxycodone, a controlled substance. A 1 day refill was provided, they will require a full refill the next business day by their primary provider.     Due to the ongoing frequency of these after hours requests, we are now tracking controlled substance check requests given to facilities should follow-up be required. Please do your part to ensure your patient's have adequate supplies of their medications to continue fluid treatment after hours and decrease the amount of providers presenting on their PDMP tracking sheet due to after hour needs.     Thank you,     [facility] after hours call team      CAMELIA Patel CNP

## 2023-06-25 NOTE — PROGRESS NOTES
Staff paged with indication of medication request.  No answer with return call.  Voicemail left at [phone].  Will await page back.    Dr. Tosha Harrison, APRN, DNP

## 2023-06-26 RX ORDER — OXYCODONE HYDROCHLORIDE 5 MG/1
5-10 TABLET ORAL EVERY 4 HOURS PRN
Qty: 20 TABLET | Refills: 0 | Status: SHIPPED | OUTPATIENT
Start: 2023-06-26 | End: 2023-06-28

## 2023-06-27 ENCOUNTER — TRANSITIONAL CARE UNIT VISIT (OUTPATIENT)
Dept: GERIATRICS | Facility: CLINIC | Age: 83
End: 2023-06-27
Payer: COMMERCIAL

## 2023-06-27 VITALS
DIASTOLIC BLOOD PRESSURE: 62 MMHG | TEMPERATURE: 97.7 F | WEIGHT: 153.6 LBS | RESPIRATION RATE: 16 BRPM | BODY MASS INDEX: 28.26 KG/M2 | OXYGEN SATURATION: 95 % | HEART RATE: 70 BPM | HEIGHT: 62 IN | SYSTOLIC BLOOD PRESSURE: 112 MMHG

## 2023-06-27 DIAGNOSIS — Z96.652 STATUS POST TOTAL LEFT KNEE REPLACEMENT: ICD-10-CM

## 2023-06-27 PROCEDURE — 99309 SBSQ NF CARE MODERATE MDM 30: CPT | Performed by: PHYSICIAN ASSISTANT

## 2023-06-27 NOTE — LETTER
"    6/27/2023        RE: Vanessa Matthew  [address] [place] [address]        [facility] GERIATRICS    Chief Complaint   Patient presents with     RECHECK     HPI:  Vanessa Matthew is a 82 year old  (1940), who is being seen today for an episodic care visit at: [facility] ([facility]) [[id number]].     Brief summary: Patient is a 83yo female with PMHx paroxysmal afib on chronic AC with apixaban, HFpEF, HTN, Gout, OA of left knee who underwent previously scheduled left TKA on 6/16/23. Surgery was uncomplicated, postoperatively patient with mild ABL anemia not requiring transfusion. Due to concerns of returning home where she lives alone, therapy recommended TCU. She is admitted to [facility] for ongoing rehab, medical management.    Patient is seen today in follow-up. She is progressing in therapy, reports her knee has started to have some sharp, intermittent pain not relieved with oxycodone. Also admits mild constipation related to pain medications. Denies cp, sob.    Allergies, and PMH/PSH reviewed in EPIC today.  REVIEW OF SYSTEMS:  4 point ROS including Respiratory, CV, GI and , other than that noted in the HPI,  is negative    Objective:   /62   Pulse 70   Temp 97.7  F (36.5  C)   Resp 16   Ht 1.575 m (5' 2\")   Wt 69.7 kg (153 lb 9.6 oz)   SpO2 95%   BMI 28.09 kg/m      GEN: well-developed, well-nourished, appears uncomfortable  HEENT: NCAT, EOM intact bilaterally, sclera clear, conjunctiva normal, nose & mouth patent, mucous membranes moist  CHEST: lungs CTA bilaterally, no increased work of breathing, no wheeze, crackles, rhonchi  HEART: RRR, S1 & S2, +systolic murmur  ABD: soft, nontender, nondistended, no guarding or rigidity  MSK: AROM bilateral UE/LE, L knee surgical incision with dressing, no surrounding erythema, mild edema; pedal & radial pulses 2+ bilaterally  NEURO: awake, alert, oriented to name, place, and time. CN II-XII grossly intact. Sensation grossly intact to " light touch.   SKIN: warm & dry without rash, trace pedal edema      Most Recent 3 CBC's:  Recent Labs   Lab Test 06/21/23  0810 06/18/23  0753 06/17/23  1029 09/12/18  2343 08/28/18  2001 04/15/18  1742   WBC  --   --   --  8.8 9.0 8.9   HGB 11.5* 11.5* 12.4 12.9 13.6 15.2   MCV  --   --   --  85 85 91   PLT  --   --   --  310 318 291     Most Recent 3 BMP's:  Recent Labs   Lab Test 06/21/23  0810 06/18/23  0753 06/16/23 0623 05/19/23  1156     --  139 151*   POTASSIUM 3.4  --  3.4* 3.2*   CHLORIDE 98  --  100 103   CO2 31*  --  28 28   BUN 19.7  --  13 24.1*   CR 0.73  --  0.75 0.82   ANIONGAP 13  --  11 20*   SARAI 9.1  --  9.5 9.7   * 128* 106 93       Assessment/Plan:    OA of L knee s/p TKA 6/16/23  Physical deconditioning  Impaired mobility and ADLs  Generalized muscle weakness  Uncomplicated surgery. Pain not controlled.  -WBAT to LLE  -DVT prophylaxis with PTA apixaban  -Pain control with tylenol 1000mg TID, oxycodone to 5-10mg q4h PRN  -Add robaxin 500mg QID PRN muscle spasms  -Bowel regimen while on narcotics  -PT/OT continuing  -SW for discharge planning    Constipation  Admits to mild constipation today.  -Change Senna-S to 1 tab at bedtime scheduled     Anemia  Hgb 12.4--11.5. stable on repeat.     Hypokalemia  Noted while inpatient, replaced per protocol. Stable in follow-up.  -Continues on chronic KCl 20mEq daily     HFpEF  HTN / HLD  Not in acute exacerbation. No edema. BPs ranging 105-112, HRs 69-89.  -Daily weights  -Continues on hydrochlorothiazide 12.5mg daily, norvasc 10mg daily, lasix 40mg daily     Paroxysmal afib  -Continues on sotalol 80mg BID, apixaban     Gout  Chronic.  -Allopurinol 3x weekly    MED REC REQUIRED  Post Medication Reconciliation Status: patient was not discharged from an inpatient facility or TCU      Orders:  -Robaxin 500mg QID PRN muscle spasms  -Senna-S 1 tab PO at bedtime     Electronically signed by: Rodney Hernandez PA-C           Sincerely,        Rodney  LORE Hernandez PA-C

## 2023-06-27 NOTE — PROGRESS NOTES
"[facility] GERIATRICS    Chief Complaint   Patient presents with     RECHECK     HPI:  Vanessa Matthew is a 82 year old  (1940), who is being seen today for an episodic care visit at: [facility] ([facility]) [[id number]].     Brief summary: Patient is a 81yo female with PMHx paroxysmal afib on chronic AC with apixaban, HFpEF, HTN, Gout, OA of left knee who underwent previously scheduled left TKA on 6/16/23. Surgery was uncomplicated, postoperatively patient with mild ABL anemia not requiring transfusion. Due to concerns of returning home where she lives alone, therapy recommended TCU. She is admitted to [facility] for ongoing rehab, medical management.    Patient is seen today in follow-up. She is progressing in therapy, reports her knee has started to have some sharp, intermittent pain not relieved with oxycodone. Also admits mild constipation related to pain medications. Denies cp, sob.    Allergies, and PMH/PSH reviewed in EPIC today.  REVIEW OF SYSTEMS:  4 point ROS including Respiratory, CV, GI and , other than that noted in the HPI,  is negative    Objective:   /62   Pulse 70   Temp 97.7  F (36.5  C)   Resp 16   Ht 1.575 m (5' 2\")   Wt 69.7 kg (153 lb 9.6 oz)   SpO2 95%   BMI 28.09 kg/m      GEN: well-developed, well-nourished, appears uncomfortable  HEENT: NCAT, EOM intact bilaterally, sclera clear, conjunctiva normal, nose & mouth patent, mucous membranes moist  CHEST: lungs CTA bilaterally, no increased work of breathing, no wheeze, crackles, rhonchi  HEART: RRR, S1 & S2, +systolic murmur  ABD: soft, nontender, nondistended, no guarding or rigidity  MSK: AROM bilateral UE/LE, L knee surgical incision with dressing, no surrounding erythema, mild edema; pedal & radial pulses 2+ bilaterally  NEURO: awake, alert, oriented to name, place, and time. CN II-XII grossly intact. Sensation grossly intact to light touch.   SKIN: warm & dry without rash, trace pedal edema      Most Recent 3 " CBC's:  Recent Labs   Lab Test 06/21/23  0810 06/18/23  0753 06/17/23  1029 09/12/18  2343 08/28/18  2001 04/15/18  1742   WBC  --   --   --  8.8 9.0 8.9   HGB 11.5* 11.5* 12.4 12.9 13.6 15.2   MCV  --   --   --  85 85 91   PLT  --   --   --  310 318 291     Most Recent 3 BMP's:  Recent Labs   Lab Test 06/21/23  0810 06/18/23  0753 06/16/23 0623 05/19/23  1156     --  139 151*   POTASSIUM 3.4  --  3.4* 3.2*   CHLORIDE 98  --  100 103   CO2 31*  --  28 28   BUN 19.7  --  13 24.1*   CR 0.73  --  0.75 0.82   ANIONGAP 13  --  11 20*   SARAI 9.1  --  9.5 9.7   * 128* 106 93       Assessment/Plan:    OA of L knee s/p TKA 6/16/23  Physical deconditioning  Impaired mobility and ADLs  Generalized muscle weakness  Uncomplicated surgery. Pain not controlled.  -WBAT to LLE  -DVT prophylaxis with PTA apixaban  -Pain control with tylenol 1000mg TID, oxycodone to 5-10mg q4h PRN  -Add robaxin 500mg QID PRN muscle spasms  -Bowel regimen while on narcotics  -PT/OT continuing  -SW for discharge planning    Constipation  Admits to mild constipation today.  -Change Senna-S to 1 tab at bedtime scheduled     Anemia  Hgb 12.4--11.5. stable on repeat.     Hypokalemia  Noted while inpatient, replaced per protocol. Stable in follow-up.  -Continues on chronic KCl 20mEq daily     HFpEF  HTN / HLD  Not in acute exacerbation. No edema. BPs ranging 105-112, HRs 69-89.  -Daily weights  -Continues on hydrochlorothiazide 12.5mg daily, norvasc 10mg daily, lasix 40mg daily     Paroxysmal afib  -Continues on sotalol 80mg BID, apixaban     Gout  Chronic.  -Allopurinol 3x weekly    MED REC REQUIRED  Post Medication Reconciliation Status: patient was not discharged from an inpatient facility or TCU      Orders:  -Robaxin 500mg QID PRN muscle spasms  -Senna-S 1 tab PO at bedtime     Electronically signed by: Rodney Hernandez PA-C

## 2023-06-28 RX ORDER — OXYCODONE HYDROCHLORIDE 5 MG/1
5-10 TABLET ORAL EVERY 4 HOURS PRN
Qty: 35 TABLET | Refills: 0 | Status: SHIPPED | OUTPATIENT
Start: 2023-06-28 | End: 2023-07-01

## 2023-06-30 ENCOUNTER — DISCHARGE SUMMARY NURSING HOME (OUTPATIENT)
Dept: GERIATRICS | Facility: CLINIC | Age: 83
End: 2023-06-30
Payer: COMMERCIAL

## 2023-06-30 VITALS
HEIGHT: 62 IN | SYSTOLIC BLOOD PRESSURE: 125 MMHG | DIASTOLIC BLOOD PRESSURE: 70 MMHG | TEMPERATURE: 97.6 F | HEART RATE: 65 BPM | RESPIRATION RATE: 16 BRPM | OXYGEN SATURATION: 96 % | WEIGHT: 155.4 LBS | BODY MASS INDEX: 28.6 KG/M2

## 2023-06-30 DIAGNOSIS — R53.81 PHYSICAL DECONDITIONING: ICD-10-CM

## 2023-06-30 DIAGNOSIS — I50.32 CHRONIC HEART FAILURE WITH PRESERVED EJECTION FRACTION (H): ICD-10-CM

## 2023-06-30 DIAGNOSIS — Z96.652 STATUS POST TOTAL LEFT KNEE REPLACEMENT: ICD-10-CM

## 2023-06-30 DIAGNOSIS — Z78.9 IMPAIRED MOBILITY AND ACTIVITIES OF DAILY LIVING: Primary | ICD-10-CM

## 2023-06-30 DIAGNOSIS — Z96.60 STATUS POST JOINT REPLACEMENT: ICD-10-CM

## 2023-06-30 DIAGNOSIS — D64.9 ANEMIA, UNSPECIFIED TYPE: ICD-10-CM

## 2023-06-30 DIAGNOSIS — Z74.09 IMPAIRED MOBILITY AND ACTIVITIES OF DAILY LIVING: Primary | ICD-10-CM

## 2023-06-30 DIAGNOSIS — I48.0 PAROXYSMAL A-FIB (H): ICD-10-CM

## 2023-06-30 DIAGNOSIS — Z96.652 S/P TOTAL KNEE ARTHROPLASTY, LEFT: ICD-10-CM

## 2023-06-30 DIAGNOSIS — E87.6 HYPOKALEMIA: ICD-10-CM

## 2023-06-30 DIAGNOSIS — K59.00 CONSTIPATION, UNSPECIFIED CONSTIPATION TYPE: ICD-10-CM

## 2023-06-30 DIAGNOSIS — I10 ESSENTIAL HYPERTENSION: ICD-10-CM

## 2023-06-30 DIAGNOSIS — I48.0 PAROXYSMAL ATRIAL FIBRILLATION (H): ICD-10-CM

## 2023-06-30 PROCEDURE — 99316 NF DSCHRG MGMT 30 MIN+: CPT | Performed by: PHYSICIAN ASSISTANT

## 2023-06-30 NOTE — PROGRESS NOTES
[facility] GERIATRICS DISCHARGE SUMMARY  PATIENT'S NAME: Vanessa Matthew  YOB: 1940  MEDICAL RECORD NUMBER:  [id number]  Place of Service where encounter took place:  [facility] (SNF) [[id number]]    PRIMARY CARE PROVIDER AND CLINIC RESPONSIBLE AFTER TRANSFER:   Historical Provider, None    Non-[facility] Provider     Transferring providers: Rodney Hernandez PA-C, Nasima Cain MD  Recent Hospitalization/ED:  [facility] Hospital stay 6/16/23 to 6/18/23.  Date of SNF Admission: June 18, 2023  Date of SNF (anticipated) Discharge: July 04, 2023  Discharged to: previous independent home  Cognitive Scores: NA  Physical Function: Ambulating 265 ft with FWW  DME: No new DME needed    CODE STATUS/ADVANCE DIRECTIVES DISCUSSION:  Full Code   ALLERGIES: Aspirin, Atorvastatin, Pravastatin, and Iodine    NURSING FACILITY COURSE     Summary of nursing facility stay:     Patient is a 81yo female with PMHx paroxysmal afib on chronic AC with apixaban, HFpEF, HTN, Gout, OA of left knee who underwent previously scheduled left TKA on 6/16/23. Surgery was uncomplicated, postoperatively patient with mild ABL anemia not requiring transfusion. Due to concerns of returning home where she lives alone, therapy recommended TCU. She is admitted to [facility] for ongoing rehab, medical management.    Following admission, pt progressed well in therapies. Was cleared to return home without concerns. Will continue with home care at discharge. The following were managed during TCU stay:    OA of L knee s/p TKA 6/16/23  Physical deconditioning  Impaired mobility and ADLs  Generalized muscle weakness  Uncomplicated surgery. Pain not controlled.  -WBAT to LLE  -DVT prophylaxis with PTA apixaban  -Pain control with tylenol 1000mg TID, oxycodone to 5-10mg q4h PRN  -PT/OT continuing     Constipation  Admits to mild constipation.  -Change Senna-S to 1 tab at bedtime scheduled  -Added Miralax 17g daily  -Prune juice  daily and PRN     Anemia  Hgb 12.4--11.5. stable on repeat.     Hypokalemia  Noted while inpatient, replaced per protocol. Stable in follow-up.  -Continues on chronic KCl 20mEq daily     HFpEF  HTN / HLD  Not in acute exacerbation. No edema. BPs ranging 105-112, HRs 69-89.  -Daily weights  -Continues on hydrochlorothiazide 12.5mg daily, norvasc 10mg daily, lasix 40mg daily     Paroxysmal afib  -Continues on sotalol 80mg BID, apixaban     Gout  Chronic.  -Allopurinol 3x weekly    Discharge Medications:  MED REC REQUIRED  Post Medication Reconciliation Status: medication reconcilation previously completed during another office visit       Current Outpatient Medications   Medication Sig Dispense Refill     acetaminophen (TYLENOL) 500 MG tablet Take 1,000 mg by mouth 3 times daily       allopurinol (ZYLOPRIM) 100 MG tablet Take 100 mg by mouth three times a week MWF       amLODIPine (NORVASC) 10 MG tablet Take 1 tablet (10 mg) by mouth daily 90 tablet 0     amoxicillin (AMOXIL) 500 MG capsule [AMOXICILLIN (AMOXIL) 500 MG CAPSULE] daily as needed (With dental appointments).   0     atorvastatin (LIPITOR) 40 MG tablet Take 40 mg by mouth daily       calcium carbonate (OS-SARAI) 600 mg (1,500 mg) tablet [CALCIUM CARBONATE (OS-SARAI) 600 MG (1,500 MG) TABLET] Take 600 mg by mouth daily.       carboxymethylcellulose PF (REFRESH PLUS) 0.5 % ophthalmic solution Place 1 drop into both eyes 3 times daily as needed for dry eyes       ELIQUIS ANTICOAGULANT 5 MG tablet Take 1 tablet (5 mg) by mouth 2 times daily 180 tablet 3     FLUoxetine (PROZAC) 10 MG capsule [FLUOXETINE (PROZAC) 10 MG CAPSULE] Take 10 mg by mouth daily.  11     furosemide (LASIX) 40 MG tablet Take 40 mg by mouth daily       hydrochlorothiazide (MICROZIDE) 12.5 MG capsule Take 12.5 mg by mouth daily       omeprazole (PRILOSEC) 20 MG DR capsule 1 capsule 30 minutes before morning meal Orally Once a day for 90 day(s)       oxyCODONE (ROXICODONE) 5 MG tablet Take 1-2  "tablets (5-10 mg) by mouth every 4 hours as needed for pain (5mg for pain 1-5, 10mg for pain 6-10) 35 tablet 0     polyethylene glycol (MIRALAX) 17 GM/Dose powder Take 17 g (1 Capful) by mouth daily 225 g 1     potassium chloride ER (KLOR-CON M) 20 MEQ CR tablet Take 20 mEq by mouth daily       SENNA-docusate sodium (SENNA S) 8.6-50 MG tablet Take 1 tablet by mouth At Bedtime 30 tablet 0     sotalol (BETAPACE) 80 MG tablet Take 1 tablet (80 mg) by mouth every 12 hours 180 tablet 3        Controlled medications:   Medication oxycodone electronically prescribed to home pharmacy     Past Medical History:   Past Medical History:   Diagnosis Date     Acute CHF (H) 04/13/2018     Antiplatelet or antithrombotic long-term use      Atrial fibrillation (H)      Essential hypertension      Essential hypertension      Gout      HLD (hyperlipidemia)      Irregular heart beat      Macular degeneration      Primary osteoarthritis involving multiple joints      RBBB      Physical Exam:   Vitals: /70   Pulse 65   Temp 97.6  F (36.4  C)   Resp 16   Ht 1.575 m (5' 2\")   Wt 70.5 kg (155 lb 6.4 oz)   SpO2 96%   BMI 28.42 kg/m    BMI: Body mass index is 28.42 kg/m .    GEN: well-developed, well-nourished, appears uncomfortable  HEENT: NCAT, EOM intact bilaterally, sclera clear, conjunctiva normal, nose & mouth patent, mucous membranes moist  CHEST: lungs CTA bilaterally, no increased work of breathing, no wheeze, crackles, rhonchi  HEART: RRR, S1 & S2, +systolic murmur  ABD: soft, nontender, nondistended, no guarding or rigidity  MSK: AROM bilateral UE/LE, L knee surgical incision c/d/i, dressing removed and without dehiscence, drainage; pedal & radial pulses 2+ bilaterally  NEURO: awake, alert, oriented to name, place, and time. CN II-XII grossly intact. Sensation grossly intact to light touch.   SKIN: warm & dry without rash, trace pedal edema    SNF labs:   Most Recent 3 CBC's:  Recent Labs   Lab Test 06/21/23  0810 " 06/18/23  0753 06/17/23  1029 09/12/18  2343 08/28/18  2001 04/15/18  1742   WBC  --   --   --  8.8 9.0 8.9   HGB 11.5* 11.5* 12.4 12.9 13.6 15.2   MCV  --   --   --  85 85 91   PLT  --   --   --  310 318 291     Most Recent 3 BMP's:  Recent Labs   Lab Test 06/21/23  0810 06/18/23  0753 06/16/23 0623 05/19/23  1156     --  139 151*   POTASSIUM 3.4  --  3.4* 3.2*   CHLORIDE 98  --  100 103   CO2 31*  --  28 28   BUN 19.7  --  13 24.1*   CR 0.73  --  0.75 0.82   ANIONGAP 13  --  11 20*   SARAI 9.1  --  9.5 9.7   * 128* 106 93       DISCHARGE PLAN:    Follow up labs: No labs orders/due    Medical Follow Up:      Follow up with primary care provider in 1-2 weeks, orthopedics as scheduled    Discharge Services: Home Care:  Occupational Therapy, Physical Therapy, Registered Nurse and Home Health Aide    Discharge Instructions Verbalized to Patient at Discharge:     OK to shower but no bathing or soaking until approved by surgeon.     Incision may be kept open to air.     Notify your surgeon if you have increased redness, swelling, tenderness, or drainage at your incision site.     TOTAL DISCHARGE TIME:   Greater than 30 minutes  Electronically signed by:  Rodney Hernandez PA-C     Documentation of Face to Face and Certification for Home Health Services    I certify that services are/were furnished while this patient was under the care of a physician and that a physician or an allowed non-physician practitioner (NPP), had a face-to-face encounter that meets the physician face-to-face encounter requirements. The encounter was in whole, or in part, related to the primary reason for home health. The patient is confined to his/her home and needs intermittent skilled nursing, physical therapy, speech-language pathology, or the continued need for occupational therapy. A plan of care has been established by a physician and is periodically reviewed by a physician.  Date of Face-to-Face Encounter: 6/30/2023.    I certify  that, based on my findings, the following services are medically necessary home health services: Nursing, Occupational Therapy and Physical Therapy.    My clinical findings support the need for the above skilled services because: Requires assistance of another person or specialized equipment to access medical services because patient: Range of motion limitations prevents ability to exit home safely...    Patient to re-establish plan of care with their PCP within 7-10 days after leaving the facility to reestablish care.  Medicare certified [facility] provider: Rodney Hernandez PA-C  Date: July 1, 2023

## 2023-06-30 NOTE — LETTER
6/30/2023        RE: Vanessa Matthew  [address] [place] [address]        [facility] GERIATRICS DISCHARGE SUMMARY  PATIENT'S NAME: Vanessa Matthew  YOB: 1940  MEDICAL RECORD NUMBER:  [id number]  Place of Service where encounter took place:  [facility] (SNF) [[id number]]    PRIMARY CARE PROVIDER AND CLINIC RESPONSIBLE AFTER TRANSFER:   Historical Provider, None    Non-FMG Provider     Transferring providers: Rodney Hernandez PA-C, Nasima Cain MD  Recent Hospitalization/ED:  [facility] Hospital stay 6/16/23 to 6/18/23.  Date of SNF Admission: June 18, 2023  Date of SNF (anticipated) Discharge: July 04, 2023  Discharged to: previous independent home  Cognitive Scores: NA  Physical Function: Ambulating 265 ft with FWW  DME: No new DME needed    CODE STATUS/ADVANCE DIRECTIVES DISCUSSION:  Full Code   ALLERGIES: Aspirin, Atorvastatin, Pravastatin, and Iodine    NURSING FACILITY COURSE     Summary of nursing facility stay:     Patient is a 81yo female with PMHx paroxysmal afib on chronic AC with apixaban, HFpEF, HTN, Gout, OA of left knee who underwent previously scheduled left TKA on 6/16/23. Surgery was uncomplicated, postoperatively patient with mild ABL anemia not requiring transfusion. Due to concerns of returning home where she lives alone, therapy recommended TCU. She is admitted to [facility] for ongoing rehab, medical management.    Following admission, pt progressed well in therapies. Was cleared to return home without concerns. Will continue with home care at discharge. The following were managed during TCU stay:    OA of L knee s/p TKA 6/16/23  Physical deconditioning  Impaired mobility and ADLs  Generalized muscle weakness  Uncomplicated surgery. Pain not controlled.  -WBAT to LLE  -DVT prophylaxis with PTA apixaban  -Pain control with tylenol 1000mg TID, oxycodone to 5-10mg q4h PRN  -PT/OT continuing     Constipation  Admits to mild  constipation.  -Change Senna-S to 1 tab at bedtime scheduled  -Added Miralax 17g daily  -Prune juice daily and PRN     Anemia  Hgb 12.4--11.5. stable on repeat.     Hypokalemia  Noted while inpatient, replaced per protocol. Stable in follow-up.  -Continues on chronic KCl 20mEq daily     HFpEF  HTN / HLD  Not in acute exacerbation. No edema. BPs ranging 105-112, HRs 69-89.  -Daily weights  -Continues on hydrochlorothiazide 12.5mg daily, norvasc 10mg daily, lasix 40mg daily     Paroxysmal afib  -Continues on sotalol 80mg BID, apixaban     Gout  Chronic.  -Allopurinol 3x weekly    Discharge Medications:  MED REC REQUIRED  Post Medication Reconciliation Status: medication reconcilation previously completed during another office visit       Current Outpatient Medications   Medication Sig Dispense Refill     acetaminophen (TYLENOL) 500 MG tablet Take 1,000 mg by mouth 3 times daily       allopurinol (ZYLOPRIM) 100 MG tablet Take 100 mg by mouth three times a week MWF       amLODIPine (NORVASC) 10 MG tablet Take 1 tablet (10 mg) by mouth daily 90 tablet 0     amoxicillin (AMOXIL) 500 MG capsule [AMOXICILLIN (AMOXIL) 500 MG CAPSULE] daily as needed (With dental appointments).   0     atorvastatin (LIPITOR) 40 MG tablet Take 40 mg by mouth daily       calcium carbonate (OS-SARAI) 600 mg (1,500 mg) tablet [CALCIUM CARBONATE (OS-SARAI) 600 MG (1,500 MG) TABLET] Take 600 mg by mouth daily.       carboxymethylcellulose PF (REFRESH PLUS) 0.5 % ophthalmic solution Place 1 drop into both eyes 3 times daily as needed for dry eyes       ELIQUIS ANTICOAGULANT 5 MG tablet Take 1 tablet (5 mg) by mouth 2 times daily 180 tablet 3     FLUoxetine (PROZAC) 10 MG capsule [FLUOXETINE (PROZAC) 10 MG CAPSULE] Take 10 mg by mouth daily.  11     furosemide (LASIX) 40 MG tablet Take 40 mg by mouth daily       hydrochlorothiazide (MICROZIDE) 12.5 MG capsule Take 12.5 mg by mouth daily       omeprazole (PRILOSEC) 20 MG DR capsule 1 capsule 30 minutes  "before morning meal Orally Once a day for 90 day(s)       oxyCODONE (ROXICODONE) 5 MG tablet Take 1-2 tablets (5-10 mg) by mouth every 4 hours as needed for pain (5mg for pain 1-5, 10mg for pain 6-10) 35 tablet 0     polyethylene glycol (MIRALAX) 17 GM/Dose powder Take 17 g (1 Capful) by mouth daily 225 g 1     potassium chloride ER (KLOR-CON M) 20 MEQ CR tablet Take 20 mEq by mouth daily       SENNA-docusate sodium (SENNA S) 8.6-50 MG tablet Take 1 tablet by mouth At Bedtime 30 tablet 0     sotalol (BETAPACE) 80 MG tablet Take 1 tablet (80 mg) by mouth every 12 hours 180 tablet 3        Controlled medications:   Medication oxycodone electronically prescribed to home pharmacy     Past Medical History:   Past Medical History:   Diagnosis Date     Acute CHF (H) 04/13/2018     Antiplatelet or antithrombotic long-term use      Atrial fibrillation (H)      Essential hypertension      Essential hypertension      Gout      HLD (hyperlipidemia)      Irregular heart beat      Macular degeneration      Primary osteoarthritis involving multiple joints      RBBB      Physical Exam:   Vitals: /70   Pulse 65   Temp 97.6  F (36.4  C)   Resp 16   Ht 1.575 m (5' 2\")   Wt 70.5 kg (155 lb 6.4 oz)   SpO2 96%   BMI 28.42 kg/m    BMI: Body mass index is 28.42 kg/m .    GEN: well-developed, well-nourished, appears uncomfortable  HEENT: NCAT, EOM intact bilaterally, sclera clear, conjunctiva normal, nose & mouth patent, mucous membranes moist  CHEST: lungs CTA bilaterally, no increased work of breathing, no wheeze, crackles, rhonchi  HEART: RRR, S1 & S2, +systolic murmur  ABD: soft, nontender, nondistended, no guarding or rigidity  MSK: AROM bilateral UE/LE, L knee surgical incision c/d/i, dressing removed and without dehiscence, drainage; pedal & radial pulses 2+ bilaterally  NEURO: awake, alert, oriented to name, place, and time. CN II-XII grossly intact. Sensation grossly intact to light touch.   SKIN: warm & dry without " rash, trace pedal edema    SNF labs:   Most Recent 3 CBC's:  Recent Labs   Lab Test 06/21/23  0810 06/18/23  0753 06/17/23  1029 09/12/18  2343 08/28/18  2001 04/15/18  1742   WBC  --   --   --  8.8 9.0 8.9   HGB 11.5* 11.5* 12.4 12.9 13.6 15.2   MCV  --   --   --  85 85 91   PLT  --   --   --  310 318 291     Most Recent 3 BMP's:  Recent Labs   Lab Test 06/21/23  0810 06/18/23  0753 06/16/23  0623 05/19/23  1156     --  139 151*   POTASSIUM 3.4  --  3.4* 3.2*   CHLORIDE 98  --  100 103   CO2 31*  --  28 28   BUN 19.7  --  13 24.1*   CR 0.73  --  0.75 0.82   ANIONGAP 13  --  11 20*   SARAI 9.1  --  9.5 9.7   * 128* 106 93       DISCHARGE PLAN:    Follow up labs: No labs orders/due    Medical Follow Up:      Follow up with primary care provider in 1-2 weeks, orthopedics as scheduled    Discharge Services: Home Care:  Occupational Therapy, Physical Therapy, Registered Nurse and Home Health Aide    Discharge Instructions Verbalized to Patient at Discharge:     OK to shower but no bathing or soaking until approved by surgeon.     Incision may be kept open to air.     Notify your surgeon if you have increased redness, swelling, tenderness, or drainage at your incision site.     TOTAL DISCHARGE TIME:   Greater than 30 minutes  Electronically signed by:  Rodney Hernandez PA-C     Documentation of Face to Face and Certification for Home Health Services    I certify that services are/were furnished while this patient was under the care of a physician and that a physician or an allowed non-physician practitioner (NPP), had a face-to-face encounter that meets the physician face-to-face encounter requirements. The encounter was in whole, or in part, related to the primary reason for home health. The patient is confined to his/her home and needs intermittent skilled nursing, physical therapy, speech-language pathology, or the continued need for occupational therapy. A plan of care has been established by a physician and  is periodically reviewed by a physician.  Date of Face-to-Face Encounter: 6/30/2023.    I certify that, based on my findings, the following services are medically necessary home health services: Nursing, Occupational Therapy and Physical Therapy.    My clinical findings support the need for the above skilled services because: Requires assistance of another person or specialized equipment to access medical services because patient: Range of motion limitations prevents ability to exit home safely...    Patient to re-establish plan of care with their PCP within 7-10 days after leaving the facility to reestablish care.  Medicare certified PECOS provider: Rodney Hernandez PA-C  Date: July 1, 2023                      Sincerely,        Rodney Hernandez PA-C

## 2023-07-01 RX ORDER — OXYCODONE HYDROCHLORIDE 5 MG/1
5-10 TABLET ORAL EVERY 4 HOURS PRN
Qty: 35 TABLET | Refills: 0 | Status: SHIPPED | OUTPATIENT
Start: 2023-07-01 | End: 2024-02-27

## 2023-07-01 RX ORDER — POLYETHYLENE GLYCOL 3350 17 G/17G
1 POWDER, FOR SOLUTION ORAL DAILY
Qty: 225 G | Refills: 1 | Status: SHIPPED | OUTPATIENT
Start: 2023-07-01 | End: 2024-02-27

## 2023-07-01 RX ORDER — SENNA AND DOCUSATE SODIUM 50; 8.6 MG/1; MG/1
1 TABLET, FILM COATED ORAL AT BEDTIME
Qty: 30 TABLET | Refills: 0 | Status: SHIPPED | OUTPATIENT
Start: 2023-07-01 | End: 2024-02-27

## 2023-07-01 NOTE — PROGRESS NOTES
[facility]      OUTPATIENT OCCUPATIONAL THERAPY  EVALUATION  PLAN OF TREATMENT FOR OUTPATIENT REHABILITATION  (COMPLETE FOR INITIAL CLAIMS ONLY)  Patient's Last Name, First Name, M.I.  YOB: 1940  Vanessa Matthew                          Provider's Name  [facility] Medical Record No.  [id number]                               Onset Date:  06/16/23   Start of Care Date:  06/17/23     Type:     ___PT   _X_OT   ___SLP Medical Diagnosis:  s/p TKA                        OT Diagnosis:  Decreased ADL independence   Visits from SOC:  1   _________________________________________________________________________________  Plan of Treatment/Functional Goals    Planned Interventions: ADL retraining, bed mobility training, transfer training, home program guidelines, progressive activity/exercise   Goals: See Occupational Therapy Goals on Care Plan in [facility] electronic health record.    Therapy Frequency: Daily  Predicted Duration of Therapy Intervention: 06/19/23  _________________________________________________________________________________    I CERTIFY THE NEED FOR THESE SERVICES FURNISHED UNDER        THIS PLAN OF TREATMENT AND WHILE UNDER MY CARE .             Physician Signature               Date    X_____________________________________________________                  Certification date from: 06/17/23, Certification date to: 07/17/23    Referring Physician: Pineda Clay MD            Initial Assessment        See Occupational Therapy evaluation dated 06/17/23 in [employer] electronic health record.      Krystyna Sanders OT 7/1/2023

## 2023-08-06 ENCOUNTER — HEALTH MAINTENANCE LETTER (OUTPATIENT)
Age: 83
End: 2023-08-06

## 2023-08-12 DIAGNOSIS — I48.0 PAROXYSMAL ATRIAL FIBRILLATION (H): ICD-10-CM

## 2023-08-14 RX ORDER — APIXABAN 5 MG/1
TABLET, FILM COATED ORAL
Qty: 180 TABLET | Refills: 0 | Status: SHIPPED | OUTPATIENT
Start: 2023-08-14 | End: 2023-11-30

## 2023-09-30 DIAGNOSIS — I48.0 PAROXYSMAL ATRIAL FIBRILLATION (H): ICD-10-CM

## 2023-10-02 RX ORDER — SOTALOL HYDROCHLORIDE 80 MG/1
80 TABLET ORAL EVERY 12 HOURS
Qty: 60 TABLET | Refills: 0 | Status: SHIPPED | OUTPATIENT
Start: 2023-10-02 | End: 2023-11-02

## 2023-10-03 ENCOUNTER — TRANSFERRED RECORDS (OUTPATIENT)
Dept: HEALTH INFORMATION MANAGEMENT | Facility: CLINIC | Age: 83
End: 2023-10-03

## 2023-11-02 DIAGNOSIS — I48.0 PAROXYSMAL ATRIAL FIBRILLATION (H): ICD-10-CM

## 2023-11-02 RX ORDER — SOTALOL HYDROCHLORIDE 80 MG/1
80 TABLET ORAL EVERY 12 HOURS
Qty: 60 TABLET | Refills: 1 | Status: SHIPPED | OUTPATIENT
Start: 2023-11-02 | End: 2024-01-24

## 2023-11-29 DIAGNOSIS — I48.0 PAROXYSMAL ATRIAL FIBRILLATION (H): ICD-10-CM

## 2023-11-30 RX ORDER — APIXABAN 5 MG/1
TABLET, FILM COATED ORAL
Qty: 180 TABLET | Refills: 0 | Status: SHIPPED | OUTPATIENT
Start: 2023-11-30 | End: 2024-02-27

## 2024-01-24 DIAGNOSIS — I48.0 PAROXYSMAL ATRIAL FIBRILLATION (H): ICD-10-CM

## 2024-01-24 RX ORDER — SOTALOL HYDROCHLORIDE 80 MG/1
TABLET ORAL
Qty: 60 TABLET | Refills: 3 | Status: SHIPPED | OUTPATIENT
Start: 2024-01-24 | End: 2024-02-27

## 2024-02-27 ENCOUNTER — OFFICE VISIT (OUTPATIENT)
Dept: CARDIOLOGY | Facility: CLINIC | Age: 84
End: 2024-02-27
Attending: INTERNAL MEDICINE
Payer: COMMERCIAL

## 2024-02-27 VITALS
HEIGHT: 62 IN | BODY MASS INDEX: 29.08 KG/M2 | SYSTOLIC BLOOD PRESSURE: 110 MMHG | WEIGHT: 158 LBS | HEART RATE: 73 BPM | DIASTOLIC BLOOD PRESSURE: 68 MMHG | RESPIRATION RATE: 16 BRPM

## 2024-02-27 DIAGNOSIS — E78.5 DYSLIPIDEMIA: ICD-10-CM

## 2024-02-27 DIAGNOSIS — I10 HYPERTENSION, UNSPECIFIED TYPE: ICD-10-CM

## 2024-02-27 DIAGNOSIS — I48.0 PAROXYSMAL ATRIAL FIBRILLATION (H): ICD-10-CM

## 2024-02-27 DIAGNOSIS — E78.5 DYSLIPIDEMIA: Primary | ICD-10-CM

## 2024-02-27 PROCEDURE — 99214 OFFICE O/P EST MOD 30 MIN: CPT | Performed by: INTERNAL MEDICINE

## 2024-02-27 PROCEDURE — 93000 ELECTROCARDIOGRAM COMPLETE: CPT | Performed by: STUDENT IN AN ORGANIZED HEALTH CARE EDUCATION/TRAINING PROGRAM

## 2024-02-27 RX ORDER — CLOBETASOL PROPIONATE 0.5 MG/ML
SOLUTION TOPICAL 2 TIMES DAILY
COMMUNITY
Start: 2024-02-19

## 2024-02-27 RX ORDER — ATORVASTATIN CALCIUM 20 MG/1
20 TABLET, FILM COATED ORAL DAILY
Qty: 90 TABLET | Refills: 3 | Status: SHIPPED | OUTPATIENT
Start: 2024-02-27

## 2024-02-27 RX ORDER — POTASSIUM CHLORIDE 1500 MG/1
20 TABLET, EXTENDED RELEASE ORAL DAILY
Qty: 90 TABLET | Refills: 3 | Status: SHIPPED | OUTPATIENT
Start: 2024-02-27 | End: 2024-02-27

## 2024-02-27 RX ORDER — AMLODIPINE BESYLATE 10 MG/1
10 TABLET ORAL DAILY
Qty: 90 TABLET | Refills: 3 | Status: SHIPPED | OUTPATIENT
Start: 2024-02-27

## 2024-02-27 RX ORDER — SOTALOL HYDROCHLORIDE 80 MG/1
80 TABLET ORAL 2 TIMES DAILY
Qty: 180 TABLET | Refills: 3 | Status: SHIPPED | OUTPATIENT
Start: 2024-02-27

## 2024-02-27 RX ORDER — POTASSIUM CHLORIDE 1500 MG/1
20 TABLET, EXTENDED RELEASE ORAL DAILY
Qty: 90 TABLET | Refills: 3 | Status: SHIPPED | OUTPATIENT
Start: 2024-02-27

## 2024-02-27 RX ORDER — FUROSEMIDE 40 MG
40 TABLET ORAL DAILY
Qty: 90 TABLET | Refills: 3 | Status: SHIPPED | OUTPATIENT
Start: 2024-02-27 | End: 2024-02-27

## 2024-02-27 RX ORDER — ATORVASTATIN CALCIUM 40 MG/1
20 TABLET, FILM COATED ORAL DAILY
Qty: 45 TABLET | Refills: 3 | Status: SHIPPED | OUTPATIENT
Start: 2024-02-27 | End: 2024-02-27

## 2024-02-27 RX ORDER — FUROSEMIDE 40 MG
40 TABLET ORAL DAILY
Qty: 90 TABLET | Refills: 3 | Status: SHIPPED | OUTPATIENT
Start: 2024-02-27

## 2024-02-27 NOTE — LETTER
2/27/2024    Historical Provider  No address on file    RE: Vanessa Matthew       Dear Colleague,     [facility] the [facility]         [facility] [address], [place] [address]   [email or web address]   OFFICE: [phone]          Impression and Plan      1.  Atrial fibrillation (paroxysmal).  This has been subjectively and objectively quiescent since my last visit with her.  Vanessa's KALINA?DS?-VASc  Score is 4 yielding an annual embolic risk of 4.8-6.7%.  She has been maintained on apixaban (Eliquis ) for CVA prophylaxis.   Plan:  Continue sotalol.  Continue apixaban for CVA prophylaxis.     3.  Hypertension.  Blood pressure is very reasonable in the office today at 110/68 mmHg.  She is on two diuretics, both furosemide and low-dose hydrochlorothiazide.  Continue current management, but will discontinue hydrochlorothiazide.    4.  Dyslipidemia.  Continue statin therapy.    Plan on follow-up in approximately 1 year.    35 minutes spent reviewing prior records (including documentation, laboratory studies, cardiac testing/imaging), interview with patient along with physical exam, planning, and subsequent documentation/crafting of note).           History of Present Illness    Once again I would like to thank you again for asking me to participate in the care of your patient, Vanessa Matthew.  As you know, but to reiterate for my own records, Vanessa Matthew is a 83 year old female seen on 14 April 2018.    Vanessa was noted to have evidence of paroxysmal atrial fibrillation.  She had spontaneously converted during that hospitalization.  She was started on sotalol and also apixaban (Eliquis ) for CVA prophylaxis.     Since her last visit, Vanessa has had no subjective recurrence of the atrial fibrillation.  Vanessa is without cardiovascular complaints today.  She denies chest pain, shortness of breath, or any symptoms of  "lightheadedness.  She is pleased with how she is performing from a cardiovascular standpoint.    Further review of systems is otherwise negative/noncontributory (medical record and 13 point review of systems reviewed as well and pertinent positives noted).         Cardiac Diagnostics      Echocardiogram 13 April 2018 (personally reviewed):  The left ventricular cavity is somewhat small.  Left ventricular systolic performance is mildly hyperdynamic with ejection fraction of 70%.   There is mild concentric increase in left ventricular wall thickness.  No significant valvular heart disease is identified on this study.   Normal right ventricular size and systolic performance.   There is mild to moderate left atrial enlargement.     Nuclear perfusion imaging study 30 April 2018:  No evidence of infarct or ischemia.  Normal left ventricular systolic performance with ejection fraction of 75     12-lead ECG (personally reviewed) 27 February 2024: Sinus rhythm.   ms.    12-lead ECG (personally reviewed) 2 June 2022: Sinus rhythm.   ms.     12-lead ECG (personally reviewed) 11 July 2018: Sinus rhythm.   ms.     12-lead ECG (personally reviewed) 18 April 2018: Sinus rhythm.  Heart rate 54 bpm.  QTc 460 ms.     12-lead ECG (personally reviewed) 15 April 2018 at 1555: Normal sinus rhythm.  Normal ECG.     12-lead ECG (personally reviewed) 15 April 2018 at 0959: Normal sinus rhythm.  Normal ECG.     12-lead ECG (personally reviewed) 13 April 2018 at 1512:   Atrial fibrillation with heart rate of 99 bpm.  Nonspecific T-wave changes.            Physical Examination       /68 (BP Location: Right arm, Patient Position: Sitting, Cuff Size: Adult Regular)   Pulse 73   Resp 16   Ht 1.575 m (5' 2\")   Wt 71.7 kg (158 lb)   BMI 28.90 kg/m          Wt Readings from Last 3 Encounters:   02/27/24 71.7 kg (158 lb)   06/30/23 70.5 kg (155 lb 6.4 oz)   06/27/23 69.7 kg (153 lb 9.6 oz)       The patient is alert " and oriented times three. Sclerae are anicteric. Mucosal membranes are moist. Jugular venous pressure is normal. No significant adenopathy/thyromegally appreciated. Lungs are clear with good expansion. On cardiovascular exam, the patient has a regular S1 and S2. Abdomen is soft and non-tender. Extremities reveal no clubbing, cyanosis, or edema.         Family History/Social History/Risk Factors   Patient does not smoke.  Family history reviewed, and family history includes Cerebrovascular Disease in her mother; Colon Cancer (age of onset: 53.00) in her brother; Heart Disease in her father; Lung Cancer (age of onset: 72.00) in her brother; No Known Problems in her sister.          Medical History  Surgical History Family History Social History   Past Medical History:   Diagnosis Date    Acute CHF (H) 04/13/2018    Antiplatelet or antithrombotic long-term use     Atrial fibrillation (H)     Essential hypertension     Essential hypertension     Gout     HLD (hyperlipidemia)     Irregular heart beat     Macular degeneration     Primary osteoarthritis involving multiple joints     RBBB      Past Surgical History:   Procedure Laterality Date    ARTHROPLASTY KNEE Left 6/16/2023    Procedure: LEFT TOTAL KNEE ARTHROPLASTY;  Surgeon: Pineda Clay MD;  Location: [facility] Main OR    BREAST CYST ASPIRATION  2001    EYE SURGERY      bilateral cataracts with IOL    HYSTERECTOMY      MAMMOPLASTY REDUCTION Bilateral     25 yrs ago       OOPHORECTOMY      removed one ovary    TOTAL KNEE ARTHROPLASTY Right 07/01/2016     Family History   Problem Relation Age of Onset    Lung Cancer Brother 72.00    Colon Cancer Brother 53.00    Cerebrovascular Disease Mother     Heart Disease Father     No Known Problems Sister     Hereditary Breast and Ovarian Cancer Syndrome No family hx of     Breast Cancer No family hx of     Cancer No family hx of     Endometrial Cancer No family hx of     Ovarian Cancer No family hx of         Social  History     Socioeconomic History    Marital status:      Spouse name: Not on file    Number of children: Not on file    Years of education: Not on file    Highest education level: Not on file   Occupational History    Not on file   Tobacco Use    Smoking status: Never    Smokeless tobacco: Never   Substance and Sexual Activity    Alcohol use: Not Currently    Drug use: No    Sexual activity: Not on file   Other Topics Concern    Not on file   Social History Narrative    [place]  Son 3 hours away .  GD in area.  7/2016       Social Determinants of Health     Financial Resource Strain: Not on file   Food Insecurity: Not on file   Transportation Needs: Not on file   Physical Activity: Not on file   Stress: Not on file   Social Connections: Not on file   Interpersonal Safety: Not on file   Housing Stability: Not on file           Medications  Allergies   Current Outpatient Medications   Medication Sig Dispense Refill    acetaminophen (TYLENOL) 500 MG tablet Take 1,000 mg by mouth 3 times daily as needed for pain      allopurinol (ZYLOPRIM) 100 MG tablet Take 100 mg by mouth three times a week MWF      amLODIPine (NORVASC) 10 MG tablet Take 1 tablet (10 mg) by mouth daily 90 tablet 0    amoxicillin (AMOXIL) 500 MG capsule [AMOXICILLIN (AMOXIL) 500 MG CAPSULE] daily as needed (With dental appointments).   0    atorvastatin (LIPITOR) 40 MG tablet Take 20 mg by mouth daily      carboxymethylcellulose PF (REFRESH PLUS) 0.5 % ophthalmic solution Place 1 drop into both eyes 3 times daily as needed for dry eyes      clobetasol (TEMOVATE) 0.05 % external solution Apply topically 2 times daily      ELIQUIS ANTICOAGULANT 5 MG tablet Take 1 tablet (5 mg) by mouth 2 times daily 180 tablet 0    FLUoxetine (PROZAC) 10 MG capsule [FLUOXETINE (PROZAC) 10 MG CAPSULE] Take 10 mg by mouth daily.  11    furosemide (LASIX) 40 MG tablet Take 40 mg by mouth daily      hydrochlorothiazide (MICROZIDE) 12.5 MG capsule  "Take 12.5 mg by mouth daily      omeprazole (PRILOSEC) 20 MG DR capsule 1 capsule 30 minutes before morning meal Orally Once a day for 90 day(s)      potassium chloride ER (KLOR-CON M) 20 MEQ CR tablet Take 20 mEq by mouth daily      sotalol (BETAPACE) 80 MG tablet Take 1 tablet by mouth every 12 hours. **Must schedule follow-up appt with Dr. Soliman** 60 tablet 3    calcium carbonate (OS-SARAI) 600 mg (1,500 mg) tablet [CALCIUM CARBONATE (OS-SARAI) 600 MG (1,500 MG) TABLET] Take 600 mg by mouth daily. (Patient not taking: Reported on 2/27/2024)         Allergies   Allergen Reactions    Aspirin     Atorvastatin Unknown     numbness    Pravastatin Unknown     numbness    Iodine Rash          Lab Results    Chemistry/lipid CBC Cardiac Enzymes/BNP/TSH/INR   Recent Labs   Lab Test 05/19/23  1156   CHOL 200*   HDL 38*   *   TRIG 284*     Recent Labs   Lab Test 05/19/23  1156 06/06/22  1147 05/19/21  1156   * 102 96     Recent Labs   Lab Test 06/21/23  0810      POTASSIUM 3.4   CHLORIDE 98   CO2 31*   *   BUN 19.7   CR 0.73   GFRESTIMATED 83   SARAI 9.1     Recent Labs   Lab Test 06/21/23  0810 06/16/23  0623 05/19/23  1156   CR 0.73 0.75 0.82     No results for input(s): \"A1C\" in the last 12077 hours.       Recent Labs   Lab Test 06/21/23  0810 06/17/23  1029 09/12/18  2343   WBC  --   --  8.8   HGB 11.5*   < > 12.9   HCT  --   --  39.4   MCV  --   --  85   PLT  --   --  310    < > = values in this interval not displayed.     Recent Labs   Lab Test 06/21/23  0810 06/18/23  0753 06/17/23  1029   HGB 11.5* 11.5* 12.4    Recent Labs   Lab Test 09/12/18  2343 08/28/18  2001 04/15/18  1742   TROPONINI <0.01 <0.01 0.02     Recent Labs   Lab Test 04/13/18  1504   *     Recent Labs   Lab Test 06/06/22  1147   TSH 1.73     Recent Labs   Lab Test 04/13/18  1504   INR 0.99          Medications  Allergies   Current Outpatient Medications   Medication Sig Dispense Refill    acetaminophen (TYLENOL) 500 MG " tablet Take 1,000 mg by mouth 3 times daily as needed for pain      allopurinol (ZYLOPRIM) 100 MG tablet Take 100 mg by mouth three times a week MWF      amLODIPine (NORVASC) 10 MG tablet Take 1 tablet (10 mg) by mouth daily 90 tablet 0    amoxicillin (AMOXIL) 500 MG capsule [AMOXICILLIN (AMOXIL) 500 MG CAPSULE] daily as needed (With dental appointments).   0    atorvastatin (LIPITOR) 40 MG tablet Take 20 mg by mouth daily      carboxymethylcellulose PF (REFRESH PLUS) 0.5 % ophthalmic solution Place 1 drop into both eyes 3 times daily as needed for dry eyes      clobetasol (TEMOVATE) 0.05 % external solution Apply topically 2 times daily      ELIQUIS ANTICOAGULANT 5 MG tablet Take 1 tablet (5 mg) by mouth 2 times daily 180 tablet 0    FLUoxetine (PROZAC) 10 MG capsule [FLUOXETINE (PROZAC) 10 MG CAPSULE] Take 10 mg by mouth daily.  11    furosemide (LASIX) 40 MG tablet Take 40 mg by mouth daily      hydrochlorothiazide (MICROZIDE) 12.5 MG capsule Take 12.5 mg by mouth daily      omeprazole (PRILOSEC) 20 MG DR capsule 1 capsule 30 minutes before morning meal Orally Once a day for 90 day(s)      potassium chloride ER (KLOR-CON M) 20 MEQ CR tablet Take 20 mEq by mouth daily      sotalol (BETAPACE) 80 MG tablet Take 1 tablet by mouth every 12 hours. **Must schedule follow-up appt with Dr. Soliman** 60 tablet 3    calcium carbonate (OS-SARAI) 600 mg (1,500 mg) tablet [CALCIUM CARBONATE (OS-SARAI) 600 MG (1,500 MG) TABLET] Take 600 mg by mouth daily. (Patient not taking: Reported on 2/27/2024)        Allergies   Allergen Reactions    Aspirin     Atorvastatin Unknown     numbness    Pravastatin Unknown     numbness    Iodine Rash          Lab Results   Lab Results   Component Value Date     06/21/2023    CO2 31 06/21/2023    CO2 28 06/16/2023    BUN 19.7 06/21/2023    BUN 13 06/16/2023     Lab Results   Component Value Date    WBC 8.8 09/12/2018    HGB 11.5 06/21/2023    HCT 39.4 09/12/2018    MCV 85 09/12/2018    PLT  310 09/12/2018     Lab Results   Component Value Date    CHOL 200 05/19/2023    TRIG 284 05/19/2023    HDL 38 05/19/2023     Lab Results   Component Value Date    INR 0.99 04/13/2018     Lab Results   Component Value Date     04/13/2018     Lab Results   Component Value Date    TROPONINI <0.01 09/12/2018    TROPONINI <0.01 08/28/2018    TROPONINI 0.02 04/15/2018     Lab Results   Component Value Date    TSH 1.73 06/06/2022                      Thank you for allowing me to participate in the care of your patient.      Sincerely,     Trevin Soliman MD     [facility]  cc:   Trevin Soliman MD  [address]  [place] [address]

## 2024-02-27 NOTE — PROGRESS NOTES
[facility] [address], [place] [address]   [email or web address]   OFFICE: [phone]          Impression and Plan      1.  Atrial fibrillation (paroxysmal).  This has been subjectively and objectively quiescent since my last visit with her.  Vanessa's KALINA?DS?-VASc  Score is 4 yielding an annual embolic risk of 4.8-6.7%.  She has been maintained on apixaban (Eliquis ) for CVA prophylaxis.   Plan:  Continue sotalol.  Continue apixaban for CVA prophylaxis.     3.  Hypertension.  Blood pressure is very reasonable in the office today at 110/68 mmHg.  She is on two diuretics, both furosemide and low-dose hydrochlorothiazide.  Continue current management, but will discontinue hydrochlorothiazide.    4.  Dyslipidemia.  Continue statin therapy.    Plan on follow-up in approximately 1 year.    35 minutes spent reviewing prior records (including documentation, laboratory studies, cardiac testing/imaging), interview with patient along with physical exam, planning, and subsequent documentation/crafting of note).           History of Present Illness    Once again I would like to thank you again for asking me to participate in the care of your patient, Vanessa Matthew.  As you know, but to reiterate for my own records, Vanessa Matthew is a 83 year old female seen on 14 April 2018.    Vanessa was noted to have evidence of paroxysmal atrial fibrillation.  She had spontaneously converted during that hospitalization.  She was started on sotalol and also apixaban (Eliquis ) for CVA prophylaxis.     Since her last visit, Vanessa has had no subjective recurrence of the atrial fibrillation.  Vanessa is without cardiovascular complaints today.  She denies chest pain, shortness of breath, or any symptoms of lightheadedness.  She is pleased with how she is performing from a cardiovascular standpoint.    Further review of systems is otherwise negative/noncontributory (medical record and 13 point  "review of systems reviewed as well and pertinent positives noted).         Cardiac Diagnostics      Echocardiogram 13 April 2018 (personally reviewed):  The left ventricular cavity is somewhat small.  Left ventricular systolic performance is mildly hyperdynamic with ejection fraction of 70%.   There is mild concentric increase in left ventricular wall thickness.  No significant valvular heart disease is identified on this study.   Normal right ventricular size and systolic performance.   There is mild to moderate left atrial enlargement.     Nuclear perfusion imaging study 30 April 2018:  No evidence of infarct or ischemia.  Normal left ventricular systolic performance with ejection fraction of 75     12-lead ECG (personally reviewed) 27 February 2024: Sinus rhythm.   ms.    12-lead ECG (personally reviewed) 2 June 2022: Sinus rhythm.   ms.     12-lead ECG (personally reviewed) 11 July 2018: Sinus rhythm.   ms.     12-lead ECG (personally reviewed) 18 April 2018: Sinus rhythm.  Heart rate 54 bpm.  QTc 460 ms.     12-lead ECG (personally reviewed) 15 April 2018 at 1555: Normal sinus rhythm.  Normal ECG.     12-lead ECG (personally reviewed) 15 April 2018 at 0959: Normal sinus rhythm.  Normal ECG.     12-lead ECG (personally reviewed) 13 April 2018 at 1512:   Atrial fibrillation with heart rate of 99 bpm.  Nonspecific T-wave changes.            Physical Examination       /68 (BP Location: Right arm, Patient Position: Sitting, Cuff Size: Adult Regular)   Pulse 73   Resp 16   Ht 1.575 m (5' 2\")   Wt 71.7 kg (158 lb)   BMI 28.90 kg/m          Wt Readings from Last 3 Encounters:   02/27/24 71.7 kg (158 lb)   06/30/23 70.5 kg (155 lb 6.4 oz)   06/27/23 69.7 kg (153 lb 9.6 oz)       The patient is alert and oriented times three. Sclerae are anicteric. Mucosal membranes are moist. Jugular venous pressure is normal. No significant adenopathy/thyromegally appreciated. Lungs are clear with good " expansion. On cardiovascular exam, the patient has a regular S1 and S2. Abdomen is soft and non-tender. Extremities reveal no clubbing, cyanosis, or edema.         Family History/Social History/Risk Factors   Patient does not smoke.  Family history reviewed, and family history includes Cerebrovascular Disease in her mother; Colon Cancer (age of onset: 53.00) in her brother; Heart Disease in her father; Lung Cancer (age of onset: 72.00) in her brother; No Known Problems in her sister.          Medical History  Surgical History Family History Social History   Past Medical History:   Diagnosis Date    Acute CHF (H) 04/13/2018    Antiplatelet or antithrombotic long-term use     Atrial fibrillation (H)     Essential hypertension     Essential hypertension     Gout     HLD (hyperlipidemia)     Irregular heart beat     Macular degeneration     Primary osteoarthritis involving multiple joints     RBBB      Past Surgical History:   Procedure Laterality Date    ARTHROPLASTY KNEE Left 6/16/2023    Procedure: LEFT TOTAL KNEE ARTHROPLASTY;  Surgeon: Pineda Clay MD;  Location: [facility] Main OR    BREAST CYST ASPIRATION  2001    EYE SURGERY      bilateral cataracts with IOL    HYSTERECTOMY      MAMMOPLASTY REDUCTION Bilateral     25 yrs ago       OOPHORECTOMY      removed one ovary    TOTAL KNEE ARTHROPLASTY Right 07/01/2016     Family History   Problem Relation Age of Onset    Lung Cancer Brother 72.00    Colon Cancer Brother 53.00    Cerebrovascular Disease Mother     Heart Disease Father     No Known Problems Sister     Hereditary Breast and Ovarian Cancer Syndrome No family hx of     Breast Cancer No family hx of     Cancer No family hx of     Endometrial Cancer No family hx of     Ovarian Cancer No family hx of         Social History     Socioeconomic History    Marital status:      Spouse name: Not on file    Number of children: Not on file    Years of education: Not on file    Highest education level: Not on  file   Occupational History    Not on file   Tobacco Use    Smoking status: Never    Smokeless tobacco: Never   Substance and Sexual Activity    Alcohol use: Not Currently    Drug use: No    Sexual activity: Not on file   Other Topics Concern    Not on file   [place] History Narrative    [place]  Son 3 hours away .  GD in area.  7/2016       Social Determinants of Health     Financial Resource Strain: Not on file   Food Insecurity: Not on file   Transportation Needs: Not on file   Physical Activity: Not on file   Stress: Not on file   Social Connections: Not on file   Interpersonal Safety: Not on file   Housing Stability: Not on file           Medications  Allergies   Current Outpatient Medications   Medication Sig Dispense Refill    acetaminophen (TYLENOL) 500 MG tablet Take 1,000 mg by mouth 3 times daily as needed for pain      allopurinol (ZYLOPRIM) 100 MG tablet Take 100 mg by mouth three times a week MWF      amLODIPine (NORVASC) 10 MG tablet Take 1 tablet (10 mg) by mouth daily 90 tablet 0    amoxicillin (AMOXIL) 500 MG capsule [AMOXICILLIN (AMOXIL) 500 MG CAPSULE] daily as needed (With dental appointments).   0    atorvastatin (LIPITOR) 40 MG tablet Take 20 mg by mouth daily      carboxymethylcellulose PF (REFRESH PLUS) 0.5 % ophthalmic solution Place 1 drop into both eyes 3 times daily as needed for dry eyes      clobetasol (TEMOVATE) 0.05 % external solution Apply topically 2 times daily      ELIQUIS ANTICOAGULANT 5 MG tablet Take 1 tablet (5 mg) by mouth 2 times daily 180 tablet 0    FLUoxetine (PROZAC) 10 MG capsule [FLUOXETINE (PROZAC) 10 MG CAPSULE] Take 10 mg by mouth daily.  11    furosemide (LASIX) 40 MG tablet Take 40 mg by mouth daily      hydrochlorothiazide (MICROZIDE) 12.5 MG capsule Take 12.5 mg by mouth daily      omeprazole (PRILOSEC) 20 MG DR capsule 1 capsule 30 minutes before morning meal Orally Once a day for 90 day(s)      potassium chloride ER (KLOR-CON M) 20 MEQ CR  "tablet Take 20 mEq by mouth daily      sotalol (BETAPACE) 80 MG tablet Take 1 tablet by mouth every 12 hours. **Must schedule follow-up appt with Dr. Soliman** 60 tablet 3    calcium carbonate (OS-SARAI) 600 mg (1,500 mg) tablet [CALCIUM CARBONATE (OS-SARAI) 600 MG (1,500 MG) TABLET] Take 600 mg by mouth daily. (Patient not taking: Reported on 2/27/2024)         Allergies   Allergen Reactions    Aspirin     Atorvastatin Unknown     numbness    Pravastatin Unknown     numbness    Iodine Rash          Lab Results    Chemistry/lipid CBC Cardiac Enzymes/BNP/TSH/INR   Recent Labs   Lab Test 05/19/23  1156   CHOL 200*   HDL 38*   *   TRIG 284*     Recent Labs   Lab Test 05/19/23  1156 06/06/22  1147 05/19/21  1156   * 102 96     Recent Labs   Lab Test 06/21/23  0810      POTASSIUM 3.4   CHLORIDE 98   CO2 31*   *   BUN 19.7   CR 0.73   GFRESTIMATED 83   SARAI 9.1     Recent Labs   Lab Test 06/21/23  0810 06/16/23  0623 05/19/23  1156   CR 0.73 0.75 0.82     No results for input(s): \"A1C\" in the last 90393 hours.       Recent Labs   Lab Test 06/21/23  0810 06/17/23  1029 09/12/18  2343   WBC  --   --  8.8   HGB 11.5*   < > 12.9   HCT  --   --  39.4   MCV  --   --  85   PLT  --   --  310    < > = values in this interval not displayed.     Recent Labs   Lab Test 06/21/23  0810 06/18/23  0753 06/17/23  1029   HGB 11.5* 11.5* 12.4    Recent Labs   Lab Test 09/12/18  2343 08/28/18  2001 04/15/18  1742   TROPONINI <0.01 <0.01 0.02     Recent Labs   Lab Test 04/13/18  1504   *     Recent Labs   Lab Test 06/06/22  1147   TSH 1.73     Recent Labs   Lab Test 04/13/18  1504   INR 0.99          Medications  Allergies   Current Outpatient Medications   Medication Sig Dispense Refill    acetaminophen (TYLENOL) 500 MG tablet Take 1,000 mg by mouth 3 times daily as needed for pain      allopurinol (ZYLOPRIM) 100 MG tablet Take 100 mg by mouth three times a week [facility]      amLODIPine (NORVASC) 10 MG tablet Take 1 " tablet (10 mg) by mouth daily 90 tablet 0    amoxicillin (AMOXIL) 500 MG capsule [AMOXICILLIN (AMOXIL) 500 MG CAPSULE] daily as needed (With dental appointments).   0    atorvastatin (LIPITOR) 40 MG tablet Take 20 mg by mouth daily      carboxymethylcellulose PF (REFRESH PLUS) 0.5 % ophthalmic solution Place 1 drop into both eyes 3 times daily as needed for dry eyes      clobetasol (TEMOVATE) 0.05 % external solution Apply topically 2 times daily      ELIQUIS ANTICOAGULANT 5 MG tablet Take 1 tablet (5 mg) by mouth 2 times daily 180 tablet 0    FLUoxetine (PROZAC) 10 MG capsule [FLUOXETINE (PROZAC) 10 MG CAPSULE] Take 10 mg by mouth daily.  11    furosemide (LASIX) 40 MG tablet Take 40 mg by mouth daily      hydrochlorothiazide (MICROZIDE) 12.5 MG capsule Take 12.5 mg by mouth daily      omeprazole (PRILOSEC) 20 MG DR capsule 1 capsule 30 minutes before morning meal Orally Once a day for 90 day(s)      potassium chloride ER (KLOR-CON M) 20 MEQ CR tablet Take 20 mEq by mouth daily      sotalol (BETAPACE) 80 MG tablet Take 1 tablet by mouth every 12 hours. **Must schedule follow-up appt with Dr. Soliman** 60 tablet 3    calcium carbonate (OS-SARAI) 600 mg (1,500 mg) tablet [CALCIUM CARBONATE (OS-SARAI) 600 MG (1,500 MG) TABLET] Take 600 mg by mouth daily. (Patient not taking: Reported on 2/27/2024)        Allergies   Allergen Reactions    Aspirin     Atorvastatin Unknown     numbness    Pravastatin Unknown     numbness    Iodine Rash          Lab Results   Lab Results   Component Value Date     06/21/2023    CO2 31 06/21/2023    CO2 28 06/16/2023    BUN 19.7 06/21/2023    BUN 13 06/16/2023     Lab Results   Component Value Date    WBC 8.8 09/12/2018    HGB 11.5 06/21/2023    HCT 39.4 09/12/2018    MCV 85 09/12/2018     09/12/2018     Lab Results   Component Value Date    CHOL 200 05/19/2023    TRIG 284 05/19/2023    HDL 38 05/19/2023     Lab Results   Component Value Date    INR 0.99 04/13/2018     Lab  Results   Component Value Date     04/13/2018     Lab Results   Component Value Date    TROPONINI <0.01 09/12/2018    TROPONINI <0.01 08/28/2018    TROPONINI 0.02 04/15/2018     Lab Results   Component Value Date    TSH 1.73 06/06/2022

## 2024-02-28 LAB
ATRIAL RATE - MUSE: 73 BPM
DIASTOLIC BLOOD PRESSURE - MUSE: NORMAL MMHG
INTERPRETATION ECG - MUSE: NORMAL
P AXIS - MUSE: 30 DEGREES
PR INTERVAL - MUSE: 154 MS
QRS DURATION - MUSE: 126 MS
QT - MUSE: 452 MS
QTC - MUSE: 497 MS
R AXIS - MUSE: 59 DEGREES
SYSTOLIC BLOOD PRESSURE - MUSE: NORMAL MMHG
T AXIS - MUSE: 17 DEGREES
VENTRICULAR RATE- MUSE: 73 BPM

## 2024-03-11 DIAGNOSIS — I48.0 PAROXYSMAL ATRIAL FIBRILLATION (H): ICD-10-CM

## 2024-04-05 ENCOUNTER — LAB REQUISITION (OUTPATIENT)
Dept: LAB | Facility: CLINIC | Age: 84
End: 2024-04-05

## 2024-04-05 DIAGNOSIS — E78.2 MIXED HYPERLIPIDEMIA: ICD-10-CM

## 2024-04-05 DIAGNOSIS — M1A.09X0 IDIOPATHIC CHRONIC GOUT, MULTIPLE SITES, WITHOUT TOPHUS (TOPHI): ICD-10-CM

## 2024-04-05 DIAGNOSIS — I10 ESSENTIAL (PRIMARY) HYPERTENSION: ICD-10-CM

## 2024-04-05 PROCEDURE — 80053 COMPREHEN METABOLIC PANEL: CPT | Performed by: NURSE PRACTITIONER

## 2024-04-05 PROCEDURE — 84550 ASSAY OF BLOOD/URIC ACID: CPT | Performed by: NURSE PRACTITIONER

## 2024-04-05 PROCEDURE — 80061 LIPID PANEL: CPT | Performed by: NURSE PRACTITIONER

## 2024-04-06 LAB
ALBUMIN SERPL BCG-MCNC: 4.3 G/DL (ref 3.5–5.2)
ALP SERPL-CCNC: 78 U/L (ref 40–150)
ALT SERPL W P-5'-P-CCNC: 22 U/L (ref 0–50)
ANION GAP SERPL CALCULATED.3IONS-SCNC: 13 MMOL/L (ref 7–15)
AST SERPL W P-5'-P-CCNC: 20 U/L (ref 0–45)
BILIRUB SERPL-MCNC: 0.3 MG/DL
BUN SERPL-MCNC: 23.3 MG/DL (ref 8–23)
CALCIUM SERPL-MCNC: 9.5 MG/DL (ref 8.8–10.2)
CHLORIDE SERPL-SCNC: 98 MMOL/L (ref 98–107)
CHOLEST SERPL-MCNC: 210 MG/DL
CREAT SERPL-MCNC: 0.81 MG/DL (ref 0.51–0.95)
DEPRECATED HCO3 PLAS-SCNC: 29 MMOL/L (ref 22–29)
EGFRCR SERPLBLD CKD-EPI 2021: 73 ML/MIN/1.73M2
FASTING STATUS PATIENT QL REPORTED: ABNORMAL
GLUCOSE SERPL-MCNC: 102 MG/DL (ref 70–99)
HDLC SERPL-MCNC: 42 MG/DL
LDLC SERPL CALC-MCNC: 113 MG/DL
NONHDLC SERPL-MCNC: 168 MG/DL
POTASSIUM SERPL-SCNC: 3.9 MMOL/L (ref 3.4–5.3)
PROT SERPL-MCNC: 7.1 G/DL (ref 6.4–8.3)
SODIUM SERPL-SCNC: 140 MMOL/L (ref 135–145)
TRIGL SERPL-MCNC: 277 MG/DL
URATE SERPL-MCNC: 4.7 MG/DL (ref 2.4–5.7)

## 2024-09-28 ENCOUNTER — HEALTH MAINTENANCE LETTER (OUTPATIENT)
Age: 84
End: 2024-09-28

## 2025-02-24 DIAGNOSIS — I10 HYPERTENSION, UNSPECIFIED TYPE: ICD-10-CM

## 2025-02-24 RX ORDER — AMLODIPINE BESYLATE 10 MG/1
TABLET ORAL
Qty: 90 TABLET | Refills: 0 | Status: SHIPPED | OUTPATIENT
Start: 2025-02-24

## 2025-03-02 DIAGNOSIS — I10 HYPERTENSION, UNSPECIFIED TYPE: ICD-10-CM

## 2025-03-03 RX ORDER — POTASSIUM CHLORIDE 1500 MG/1
20 TABLET, EXTENDED RELEASE ORAL DAILY
Qty: 90 TABLET | Refills: 0 | Status: SHIPPED | OUTPATIENT
Start: 2025-03-03

## 2025-04-02 ENCOUNTER — LAB REQUISITION (OUTPATIENT)
Dept: LAB | Facility: CLINIC | Age: 85
End: 2025-04-02

## 2025-04-02 ENCOUNTER — TRANSFERRED RECORDS (OUTPATIENT)
Dept: HEALTH INFORMATION MANAGEMENT | Facility: CLINIC | Age: 85
End: 2025-04-02

## 2025-04-02 DIAGNOSIS — E78.2 MIXED HYPERLIPIDEMIA: ICD-10-CM

## 2025-04-02 DIAGNOSIS — M1A.09X0 IDIOPATHIC CHRONIC GOUT, MULTIPLE SITES, WITHOUT TOPHUS (TOPHI): ICD-10-CM

## 2025-04-02 DIAGNOSIS — I10 ESSENTIAL (PRIMARY) HYPERTENSION: ICD-10-CM

## 2025-04-02 PROCEDURE — 84550 ASSAY OF BLOOD/URIC ACID: CPT | Performed by: NURSE PRACTITIONER

## 2025-04-02 PROCEDURE — 80061 LIPID PANEL: CPT | Performed by: NURSE PRACTITIONER

## 2025-04-02 PROCEDURE — 80053 COMPREHEN METABOLIC PANEL: CPT | Performed by: NURSE PRACTITIONER

## 2025-04-03 LAB
ALBUMIN SERPL BCG-MCNC: 4.4 G/DL (ref 3.5–5.2)
ALP SERPL-CCNC: 86 U/L (ref 40–150)
ALT SERPL W P-5'-P-CCNC: 32 U/L (ref 0–50)
ANION GAP SERPL CALCULATED.3IONS-SCNC: 15 MMOL/L (ref 7–15)
AST SERPL W P-5'-P-CCNC: 29 U/L (ref 0–45)
BILIRUB SERPL-MCNC: 0.4 MG/DL
BUN SERPL-MCNC: 17.3 MG/DL (ref 8–23)
CALCIUM SERPL-MCNC: 9.6 MG/DL (ref 8.8–10.4)
CHLORIDE SERPL-SCNC: 99 MMOL/L (ref 98–107)
CHOLEST SERPL-MCNC: 175 MG/DL
CREAT SERPL-MCNC: 0.74 MG/DL (ref 0.51–0.95)
EGFRCR SERPLBLD CKD-EPI 2021: 80 ML/MIN/1.73M2
FASTING STATUS PATIENT QL REPORTED: ABNORMAL
FASTING STATUS PATIENT QL REPORTED: NORMAL
GLUCOSE SERPL-MCNC: 86 MG/DL (ref 70–99)
HCO3 SERPL-SCNC: 28 MMOL/L (ref 22–29)
HDLC SERPL-MCNC: 36 MG/DL
LDLC SERPL CALC-MCNC: 88 MG/DL
NONHDLC SERPL-MCNC: 139 MG/DL
POTASSIUM SERPL-SCNC: 3.5 MMOL/L (ref 3.4–5.3)
PROT SERPL-MCNC: 7.3 G/DL (ref 6.4–8.3)
SODIUM SERPL-SCNC: 142 MMOL/L (ref 135–145)
TRIGL SERPL-MCNC: 255 MG/DL
URATE SERPL-MCNC: 7.5 MG/DL (ref 2.4–5.7)

## 2025-04-24 DIAGNOSIS — I10 HYPERTENSION, UNSPECIFIED TYPE: ICD-10-CM

## 2025-04-24 RX ORDER — FUROSEMIDE 40 MG/1
40 TABLET ORAL
Qty: 90 TABLET | Refills: 3 | Status: SHIPPED | OUTPATIENT
Start: 2025-04-24

## 2025-05-01 ENCOUNTER — TRANSFERRED RECORDS (OUTPATIENT)
Dept: HEALTH INFORMATION MANAGEMENT | Facility: CLINIC | Age: 85
End: 2025-05-01
Payer: COMMERCIAL

## 2025-05-01 ENCOUNTER — LAB REQUISITION (OUTPATIENT)
Dept: LAB | Facility: CLINIC | Age: 85
End: 2025-05-01

## 2025-05-01 DIAGNOSIS — M1A.09X0 IDIOPATHIC CHRONIC GOUT, MULTIPLE SITES, WITHOUT TOPHUS (TOPHI): ICD-10-CM

## 2025-05-01 LAB — URATE SERPL-MCNC: 6.3 MG/DL (ref 2.4–5.7)

## 2025-05-01 PROCEDURE — 84550 ASSAY OF BLOOD/URIC ACID: CPT | Performed by: NURSE PRACTITIONER

## 2025-05-27 DIAGNOSIS — I10 HYPERTENSION, UNSPECIFIED TYPE: ICD-10-CM

## 2025-05-27 DIAGNOSIS — I48.0 PAROXYSMAL ATRIAL FIBRILLATION (H): ICD-10-CM

## 2025-05-28 RX ORDER — SOTALOL HYDROCHLORIDE 80 MG/1
80 TABLET ORAL
Qty: 180 TABLET | Refills: 0 | Status: SHIPPED | OUTPATIENT
Start: 2025-05-28

## 2025-05-28 RX ORDER — AMLODIPINE BESYLATE 10 MG/1
10 TABLET ORAL
Qty: 90 TABLET | Refills: 0 | Status: SHIPPED | OUTPATIENT
Start: 2025-05-28

## 2025-06-03 ENCOUNTER — OFFICE VISIT (OUTPATIENT)
Dept: CARDIOLOGY | Facility: CLINIC | Age: 85
End: 2025-06-03
Payer: COMMERCIAL

## 2025-06-03 VITALS
OXYGEN SATURATION: 97 % | HEART RATE: 71 BPM | WEIGHT: 157 LBS | DIASTOLIC BLOOD PRESSURE: 74 MMHG | SYSTOLIC BLOOD PRESSURE: 114 MMHG | BODY MASS INDEX: 28.72 KG/M2

## 2025-06-03 DIAGNOSIS — I10 HYPERTENSION, UNSPECIFIED TYPE: ICD-10-CM

## 2025-06-03 DIAGNOSIS — E78.5 DYSLIPIDEMIA: ICD-10-CM

## 2025-06-03 DIAGNOSIS — I48.0 PAROXYSMAL ATRIAL FIBRILLATION (H): Primary | ICD-10-CM

## 2025-06-03 LAB
ATRIAL RATE - MUSE: 66 BPM
DIASTOLIC BLOOD PRESSURE - MUSE: NORMAL MMHG
INTERPRETATION ECG - MUSE: NORMAL
P AXIS - MUSE: 31 DEGREES
PR INTERVAL - MUSE: 164 MS
QRS DURATION - MUSE: 120 MS
QT - MUSE: 440 MS
QTC - MUSE: 461 MS
R AXIS - MUSE: 67 DEGREES
SYSTOLIC BLOOD PRESSURE - MUSE: NORMAL MMHG
T AXIS - MUSE: 33 DEGREES
VENTRICULAR RATE- MUSE: 66 BPM

## 2025-06-03 PROCEDURE — 3078F DIAST BP <80 MM HG: CPT | Performed by: INTERNAL MEDICINE

## 2025-06-03 PROCEDURE — 3074F SYST BP LT 130 MM HG: CPT | Performed by: INTERNAL MEDICINE

## 2025-06-03 PROCEDURE — 93000 ELECTROCARDIOGRAM COMPLETE: CPT | Performed by: GENERAL ACUTE CARE HOSPITAL

## 2025-06-03 PROCEDURE — 99214 OFFICE O/P EST MOD 30 MIN: CPT | Performed by: INTERNAL MEDICINE

## 2025-06-03 RX ORDER — POTASSIUM CHLORIDE 1500 MG/1
20 TABLET, EXTENDED RELEASE ORAL DAILY
Qty: 90 TABLET | Refills: 0 | Status: SHIPPED | OUTPATIENT
Start: 2025-06-03 | End: 2025-06-03

## 2025-06-03 RX ORDER — POTASSIUM CHLORIDE 1500 MG/1
20 TABLET, EXTENDED RELEASE ORAL DAILY
Qty: 90 TABLET | Refills: 3 | Status: SHIPPED | OUTPATIENT
Start: 2025-06-03

## 2025-06-03 RX ORDER — SOTALOL HYDROCHLORIDE 80 MG/1
80 TABLET ORAL
Qty: 180 TABLET | Refills: 3 | Status: SHIPPED | OUTPATIENT
Start: 2025-06-03

## 2025-06-03 RX ORDER — ATORVASTATIN CALCIUM 20 MG/1
20 TABLET, FILM COATED ORAL
Qty: 90 TABLET | Refills: 0 | Status: SHIPPED | OUTPATIENT
Start: 2025-06-03

## 2025-06-03 RX ORDER — AMLODIPINE BESYLATE 10 MG/1
10 TABLET ORAL
Qty: 90 TABLET | Refills: 3 | Status: SHIPPED | OUTPATIENT
Start: 2025-06-03

## 2025-06-03 RX ORDER — FUROSEMIDE 40 MG/1
40 TABLET ORAL
Qty: 90 TABLET | Refills: 3 | Status: SHIPPED | OUTPATIENT
Start: 2025-06-03

## 2025-06-03 NOTE — PROGRESS NOTES
"       [facility]   [address], [place] [address]   [email or web address]   OFFICE: [phone]          Impression and Plan     1.  Atrial fibrillation (paroxysmal).  This has been subjectively and objectively quiescent since my last visit with her.  Vanessa's KALINA?DS?-VASc  Score is 4 yielding an annual embolic risk of 4.8-6.7%.  She has been maintained on apixaban (Eliquis ) for CVA prophylaxis.  QTc on today's twelve-lead ECG is reasonable at 461 ms.  Plan:  Continue sotalol.  Continue apixaban for CVA prophylaxis.     3.  Hypertension.  Blood pressure is very reasonable in the office today at 114/74 mmHg.       4.  Dyslipidemia.  Continue statin therapy.     Plan on follow-up in approximately 1 year.       35 minutes spent reviewing prior records (including documentation, laboratory studies, cardiac testing/imaging), interview with patient along with physical exam, planning, and subsequent documentation/crafting of note).     The longitudinal plan of care for atrial fibrillation, hypertension, & dyslipidemia was addressed during this visit.?Due to the added complexity in care, I will continue to support Vanessa Matthew in the subsequent management of this condition(s) and with the ongoing continuity of care of this condition(s)\".           History of Present Illness    Once again I would like to thank you again for asking me to participate in the care of your patient, Vanessa Matthew.  As you know, but to reiterate for my own records, Vanessa Matthew is a 84 year old female with paroxysmal atrial fibrillation.  She has been maintained on sotalol and also apixaban (Eliquis ) for CVA prophylaxis.     Since her last visit, Vanessa has had no subjective recurrence of the atrial fibrillation.  Vanessa is without cardiovascular complaints today.  She denies chest pain, shortness of breath, or any symptoms of lightheadedness.  She is pleased with how she is performing from a " cardiovascular standpoint.      Further review of systems is otherwise negative/noncontributory (medical record and 13 point review of systems reviewed as well and pertinent positives noted).         Cardiac Diagnostics      Echocardiogram 13 April 2018 (personally reviewed):  The left ventricular cavity is somewhat small.  Left ventricular systolic performance is mildly hyperdynamic with ejection fraction of 70%.   There is mild concentric increase in left ventricular wall thickness.  No significant valvular heart disease is identified on this study.   Normal right ventricular size and systolic performance.   There is mild to moderate left atrial enlargement.     Nuclear perfusion imaging study 30 April 2018:  No evidence of infarct or ischemia.  Normal left ventricular systolic performance with ejection fraction of 75     Twelve-lead ECG (personally reviewed) 3 [place] 2025: Sinus rhythm.  Right bundle branch block.  QTc 461 ms.    12-lead ECG (personally reviewed) 27 February 2024: Sinus rhythm.  Right bundle branch block.   ms.     12-lead ECG (personally reviewed) 2 June 2022: Sinus rhythm.   ms.     12-lead ECG (personally reviewed) 11 July 2018: Sinus rhythm.   ms.     12-lead ECG (personally reviewed) 18 April 2018: Sinus rhythm.  Heart rate 54 bpm.  QTc 460 ms.     12-lead ECG (personally reviewed) 15 April 2018 at 1555: Normal sinus rhythm.  Normal ECG.     12-lead ECG (personally reviewed) 15 April 2018 at 0959: Normal sinus rhythm.  Normal ECG.     12-lead ECG (personally reviewed) 13 April 2018 at 1512:   Atrial fibrillation with heart rate of 99 bpm.  Nonspecific T-wave changes.         Physical Examination       /74 (BP Location: Left arm, Patient Position: Sitting, Cuff Size: Adult Large)   Pulse 71   Wt 71.2 kg (157 lb)   SpO2 97%   BMI 28.72 kg/m          Wt Readings from Last 3 Encounters:   06/03/25 71.2 kg (157 lb)   02/27/24 71.7 kg (158 lb)   06/30/23 70.5 kg (155  lb 6.4 oz)       The patient is alert and oriented times three. Sclerae are anicteric. Mucosal membranes are moist. Jugular venous pressure is normal. No significant adenopathy/thyromegally appreciated. Lungs are clear with good expansion. On cardiovascular exam, the patient has a regular S1 and S2. Abdomen is soft and non-tender. Extremities reveal no clubbing, cyanosis, or edema.         Family History/Social History/Risk Factors   Patient does not smoke.  Family history reviewed, and family history includes Cerebrovascular Disease in her mother; Colon Cancer (age of onset: 53.00) in her brother; Heart Disease in her father; Lung Cancer (age of onset: 72.00) in her brother; No Known Problems in her sister.          Medical History  Surgical History Family History Social History   Past Medical History:   Diagnosis Date    Acute CHF (H) 04/13/2018    Antiplatelet or antithrombotic long-term use     Atrial fibrillation (H)     Essential hypertension     Essential hypertension     Gout     HLD (hyperlipidemia)     Irregular heart beat     Macular degeneration     Primary osteoarthritis involving multiple joints     RBBB      Past Surgical History:   Procedure Laterality Date    ARTHROPLASTY KNEE Left 6/16/2023    Procedure: LEFT TOTAL KNEE ARTHROPLASTY;  Surgeon: Pineda Clay MD;  Location: [facility] Main OR    BREAST CYST ASPIRATION  2001    EYE SURGERY      bilateral cataracts with IOL    HYSTERECTOMY      MAMMOPLASTY REDUCTION Bilateral     25 yrs ago       OOPHORECTOMY      removed one ovary    TOTAL KNEE ARTHROPLASTY Right 07/01/2016     Family History   Problem Relation Age of Onset    Lung Cancer Brother 72.00    Colon Cancer Brother 53.00    Cerebrovascular Disease Mother     Heart Disease Father     No Known Problems Sister     Hereditary Breast and Ovarian Cancer Syndrome No family hx of     Breast Cancer No family hx of     Cancer No family hx of     Endometrial Cancer No family hx of     Ovarian Cancer  No family hx of         Social History     Socioeconomic History    Marital status:      Spouse name: Not on file    Number of children: Not on file    Years of education: Not on file    Highest education level: Not on file   Occupational History    Not on file   Tobacco Use    Smoking status: Never    Smokeless tobacco: Never   Substance and Sexual Activity    Alcohol use: Not Currently    Drug use: No    Sexual activity: Not on file   Other Topics Concern    Not on file   Social History Narrative    [place]  Son 3 hours away .  GD in area.  7/2016       Social Drivers of Health     Financial Resource Strain: Not on file   Food Insecurity: Not on file   Transportation Needs: Not on file   Physical Activity: Not on file   Stress: Not on file   Social Connections: Not on file   Interpersonal Safety: Not on file   Housing Stability: Not on file           Medications  Allergies   Current Outpatient Medications   Medication Sig Dispense Refill    acetaminophen (TYLENOL) 500 MG tablet Take 1,000 mg by mouth 3 times daily as needed for pain      allopurinol (ZYLOPRIM) 100 MG tablet Take 100 mg by mouth three times a week MWF      amLODIPine (NORVASC) 10 MG tablet TAKE ONE TABLET (10MG) BY MOUTH  DAILY 90 tablet 0    atorvastatin (LIPITOR) 20 MG tablet TAKE ONE TABLET BY MOUTH ONE TIME DAILY 90 tablet 0    calcium carbonate (OS-SARAI) 600 mg (1,500 mg) tablet [CALCIUM CARBONATE (OS-SARAI) 600 MG (1,500 MG) TABLET] Take 600 mg by mouth daily.      carboxymethylcellulose PF (REFRESH PLUS) 0.5 % ophthalmic solution Place 1 drop into both eyes 3 times daily as needed for dry eyes      clobetasol (TEMOVATE) 0.05 % external solution Apply topically 2 times daily      ELIQUIS ANTICOAGULANT 5 MG tablet Take 1 tablet (5 mg) by mouth 2 times daily 180 tablet 0    FLUoxetine (PROZAC) 10 MG capsule [FLUOXETINE (PROZAC) 10 MG CAPSULE] Take 10 mg by mouth daily.  11    furosemide (LASIX) 40 MG tablet TAKE ONE TABLET  "(40MG) BY MOUTH DAILY 90 tablet 3    KLOR-CON M20 20 MEQ CR tablet TAKE ONE TABLET BY MOUTH ONE TIME DAILY 90 tablet 0    omeprazole (PRILOSEC) 20 MG DR capsule 1 capsule 30 minutes before morning meal Orally Once a day for 90 day(s)      sotalol (BETAPACE) 80 MG tablet TAKE ONE TABLET BY MOUTH TWICE DAILY 180 tablet 0    amoxicillin (AMOXIL) 500 MG capsule [AMOXICILLIN (AMOXIL) 500 MG CAPSULE] daily as needed (With dental appointments).  (Patient not taking: Reported on 6/3/2025)  0       Allergies   Allergen Reactions    Aspirin     Atorvastatin Unknown     numbness    Pravastatin Unknown     numbness    Iodine Rash          Lab Results    Chemistry/lipid CBC Cardiac Enzymes/BNP/TSH/INR   Recent Labs   Lab Test 04/02/25  1208   CHOL 175   HDL 36*   LDL 88   TRIG 255*     Recent Labs   Lab Test 04/02/25  1208 04/05/24  1119 05/19/23  1156   LDL 88 113* 105*     Recent Labs   Lab Test 04/02/25  1208      POTASSIUM 3.5   CHLORIDE 99   CO2 28   GLC 86   BUN 17.3   CR 0.74   GFRESTIMATED 80   SARAI 9.6     Recent Labs   Lab Test 04/02/25  1208 04/05/24  1119 06/21/23  0810   CR 0.74 0.81 0.73     No results for input(s): \"A1C\" in the last 29842 hours.       Recent Labs   Lab Test 06/21/23  0810 06/17/23  1029 09/12/18  2343   WBC  --   --  8.8   HGB 11.5*   < > 12.9   HCT  --   --  39.4   MCV  --   --  85   PLT  --   --  310    < > = values in this interval not displayed.     Recent Labs   Lab Test 06/21/23  0810 06/18/23  0753 06/17/23  1029   HGB 11.5* 11.5* 12.4    Recent Labs   Lab Test 09/12/18  2343 08/28/18  2001 04/15/18  1742   TROPONINI <0.01 <0.01 0.02     Recent Labs   Lab Test 04/13/18  1504   *     Recent Labs   Lab Test 06/06/22  1147   TSH 1.73     Recent Labs   Lab Test 04/13/18  1504   INR 0.99          Medications  Allergies   Current Outpatient Medications   Medication Sig Dispense Refill    acetaminophen (TYLENOL) 500 MG tablet Take 1,000 mg by mouth 3 times daily as needed for pain      " allopurinol (ZYLOPRIM) 100 MG tablet Take 100 mg by mouth three times a week MWF      amLODIPine (NORVASC) 10 MG tablet TAKE ONE TABLET (10MG) BY MOUTH  DAILY 90 tablet 0    atorvastatin (LIPITOR) 20 MG tablet TAKE ONE TABLET BY MOUTH ONE TIME DAILY 90 tablet 0    calcium carbonate (OS-SARAI) 600 mg (1,500 mg) tablet [CALCIUM CARBONATE (OS-SARAI) 600 MG (1,500 MG) TABLET] Take 600 mg by mouth daily.      carboxymethylcellulose PF (REFRESH PLUS) 0.5 % ophthalmic solution Place 1 drop into both eyes 3 times daily as needed for dry eyes      clobetasol (TEMOVATE) 0.05 % external solution Apply topically 2 times daily      ELIQUIS ANTICOAGULANT 5 MG tablet Take 1 tablet (5 mg) by mouth 2 times daily 180 tablet 0    FLUoxetine (PROZAC) 10 MG capsule [FLUOXETINE (PROZAC) 10 MG CAPSULE] Take 10 mg by mouth daily.  11    furosemide (LASIX) 40 MG tablet TAKE ONE TABLET (40MG) BY MOUTH DAILY 90 tablet 3    KLOR-CON M20 20 MEQ CR tablet TAKE ONE TABLET BY MOUTH ONE TIME DAILY 90 tablet 0    omeprazole (PRILOSEC) 20 MG DR capsule 1 capsule 30 minutes before morning meal Orally Once a day for 90 day(s)      sotalol (BETAPACE) 80 MG tablet TAKE ONE TABLET BY MOUTH TWICE DAILY 180 tablet 0    amoxicillin (AMOXIL) 500 MG capsule [AMOXICILLIN (AMOXIL) 500 MG CAPSULE] daily as needed (With dental appointments).  (Patient not taking: Reported on 6/3/2025)  0      Allergies   Allergen Reactions    Aspirin     Atorvastatin Unknown     numbness    Pravastatin Unknown     numbness    Iodine Rash          Lab Results   Lab Results   Component Value Date     04/02/2025    CO2 28 04/02/2025    CO2 28 06/16/2023    BUN 17.3 04/02/2025    BUN 13 06/16/2023     Lab Results   Component Value Date    WBC 8.8 09/12/2018    HGB 11.5 06/21/2023    HCT 39.4 09/12/2018    MCV 85 09/12/2018     09/12/2018     Lab Results   Component Value Date    CHOL 175 04/02/2025    TRIG 255 04/02/2025    HDL 36 04/02/2025     Lab Results   Component Value  Date    INR 0.99 04/13/2018     Lab Results   Component Value Date     04/13/2018     Lab Results   Component Value Date    TROPONINI <0.01 09/12/2018    TROPONINI <0.01 08/28/2018    TROPONINI 0.02 04/15/2018     Lab Results   Component Value Date    TSH 1.73 06/06/2022

## 2025-06-03 NOTE — LETTER
"6/3/2025    Historical Provider  No address on file    RE: Vanessa Matthew       Dear Colleague,     I had the pleasure of seeing Vanessa Matthew in the [facility].         [facility] [address], [place] [address]   [email or web address]   OFFICE: [phone]          Impression and Plan     1.  Atrial fibrillation (paroxysmal).  This has been subjectively and objectively quiescent since my last visit with her.  Vanessa's [id number]  Score is 4 yielding an annual embolic risk of 4.8-6.7%.  She has been maintained on apixaban (Eliquis ) for CVA prophylaxis.  QTc on today's twelve-lead ECG is reasonable at 461 ms.  Plan:  Continue sotalol.  Continue apixaban for CVA prophylaxis.     3.  Hypertension.  Blood pressure is very reasonable in the office today at 114/74 mmHg.       4.  Dyslipidemia.  Continue statin therapy.     Plan on follow-up in approximately 1 year.       35 minutes spent reviewing prior records (including documentation, laboratory studies, cardiac testing/imaging), interview with patient along with physical exam, planning, and subsequent documentation/crafting of note).     The longitudinal plan of care for atrial fibrillation, hypertension, & dyslipidemia was addressed during this visit.?Due to the added complexity in care, I will continue to support Vanessa Matthew in the subsequent management of this condition(s) and with the ongoing continuity of care of this condition(s)\".           History of Present Illness    Once again I would like to thank you again for asking me to participate in the care of your patient, Vanessa Matthew.  As you know, but to reiterate for my own records, Vanessa Matthew is a 84 year old female with paroxysmal atrial fibrillation.  She has been maintained on sotalol and also apixaban (Eliquis ) for CVA prophylaxis.     Since her last visit, Vanessa has had no subjective recurrence of the atrial fibrillation.  " Vanessa is without cardiovascular complaints today.  She denies chest pain, shortness of breath, or any symptoms of lightheadedness.  She is pleased with how she is performing from a cardiovascular standpoint.      Further review of systems is otherwise negative/noncontributory (medical record and 13 point review of systems reviewed as well and pertinent positives noted).         Cardiac Diagnostics      Echocardiogram 13 April 2018 (personally reviewed):  The left ventricular cavity is somewhat small.  Left ventricular systolic performance is mildly hyperdynamic with ejection fraction of 70%.   There is mild concentric increase in left ventricular wall thickness.  No significant valvular heart disease is identified on this study.   Normal right ventricular size and systolic performance.   There is mild to moderate left atrial enlargement.     Nuclear perfusion imaging study 30 April 2018:  No evidence of infarct or ischemia.  Normal left ventricular systolic performance with ejection fraction of 75     Twelve-lead ECG (personally reviewed) 3 [place] 2025: Sinus rhythm.  Right bundle branch block.  QTc 461 ms.    12-lead ECG (personally reviewed) 27 February 2024: Sinus rhythm.  Right bundle branch block.   ms.     12-lead ECG (personally reviewed) 2 June 2022: Sinus rhythm.   ms.     12-lead ECG (personally reviewed) 11 July 2018: Sinus rhythm.   ms.     12-lead ECG (personally reviewed) 18 April 2018: Sinus rhythm.  Heart rate 54 bpm.  QTc 460 ms.     12-lead ECG (personally reviewed) 15 April 2018 at 1555: Normal sinus rhythm.  Normal ECG.     12-lead ECG (personally reviewed) 15 April 2018 at 0959: Normal sinus rhythm.  Normal ECG.     12-lead ECG (personally reviewed) 13 April 2018 at 1512:   Atrial fibrillation with heart rate of 99 bpm.  Nonspecific T-wave changes.         Physical Examination       /74 (BP Location: Left arm, Patient Position: Sitting, Cuff Size: Adult Large)   Pulse 71    Wt 71.2 kg (157 lb)   SpO2 97%   BMI 28.72 kg/m          Wt Readings from Last 3 Encounters:   06/03/25 71.2 kg (157 lb)   02/27/24 71.7 kg (158 lb)   06/30/23 70.5 kg (155 lb 6.4 oz)       The patient is alert and oriented times three. Sclerae are anicteric. Mucosal membranes are moist. Jugular venous pressure is normal. No significant adenopathy/thyromegally appreciated. Lungs are clear with good expansion. On cardiovascular exam, the patient has a regular S1 and S2. Abdomen is soft and non-tender. Extremities reveal no clubbing, cyanosis, or edema.         Family History/Social History/Risk Factors   Patient does not smoke.  Family history reviewed, and family history includes Cerebrovascular Disease in her mother; Colon Cancer (age of onset: 53.00) in her brother; Heart Disease in her father; Lung Cancer (age of onset: 72.00) in her brother; No Known Problems in her sister.          Medical History  Surgical History Family History Social History   Past Medical History:   Diagnosis Date     Acute CHF (H) 04/13/2018     Antiplatelet or antithrombotic long-term use      Atrial fibrillation (H)      Essential hypertension      Essential hypertension      Gout      HLD (hyperlipidemia)      Irregular heart beat      Macular degeneration      Primary osteoarthritis involving multiple joints      RBBB      Past Surgical History:   Procedure Laterality Date     ARTHROPLASTY KNEE Left 6/16/2023    Procedure: LEFT TOTAL KNEE ARTHROPLASTY;  Surgeon: Pineda Clay MD;  Location: [facility] Main OR     BREAST CYST ASPIRATION  2001     EYE SURGERY      bilateral cataracts with IOL     HYSTERECTOMY       MAMMOPLASTY REDUCTION Bilateral     25 yrs ago        OOPHORECTOMY      removed one ovary     TOTAL KNEE ARTHROPLASTY Right 07/01/2016     Family History   Problem Relation Age of Onset     Lung Cancer Brother 72.00     Colon Cancer Brother 53.00     Cerebrovascular Disease Mother      Heart Disease Father      No Known  Problems Sister      Hereditary Breast and Ovarian Cancer Syndrome No family hx of      Breast Cancer No family hx of      Cancer No family hx of      Endometrial Cancer No family hx of      Ovarian Cancer No family hx of         Social History     Socioeconomic History     Marital status:      Spouse name: Not on file     Number of children: Not on file     Years of education: Not on file     Highest education level: Not on file   Occupational History     Not on file   Tobacco Use     Smoking status: Never     Smokeless tobacco: Never   Substance and Sexual Activity     Alcohol use: Not Currently     Drug use: No     Sexual activity: Not on file   Other Topics Concern     Not on file   Social History Narrative    [place]  Son 3 [place] away .  GD in area.  7/2016       Social Drivers of Health     Financial Resource Strain: Not on file   Food Insecurity: Not on file   Transportation Needs: Not on file   Physical Activity: Not on file   Stress: Not on file   Social Connections: Not on file   Interpersonal Safety: Not on file   Housing Stability: Not on file           Medications  Allergies   Current Outpatient Medications   Medication Sig Dispense Refill     acetaminophen (TYLENOL) 500 MG tablet Take 1,000 mg by mouth 3 times daily as needed for pain       allopurinol (ZYLOPRIM) 100 MG tablet Take 100 mg by mouth three times a week MWF       amLODIPine (NORVASC) 10 MG tablet TAKE ONE TABLET (10MG) BY MOUTH  DAILY 90 tablet 0     atorvastatin (LIPITOR) 20 MG tablet TAKE ONE TABLET BY MOUTH ONE TIME DAILY 90 tablet 0     calcium carbonate (OS-SARAI) 600 mg (1,500 mg) tablet [CALCIUM CARBONATE (OS-SARAI) 600 MG (1,500 MG) TABLET] Take 600 mg by mouth daily.       carboxymethylcellulose PF (REFRESH PLUS) 0.5 % ophthalmic solution Place 1 drop into both eyes 3 times daily as needed for dry eyes       clobetasol (TEMOVATE) 0.05 % external solution Apply topically 2 times daily       ELIQUIS  "ANTICOAGULANT 5 MG tablet Take 1 tablet (5 mg) by mouth 2 times daily 180 tablet 0     FLUoxetine (PROZAC) 10 MG capsule [FLUOXETINE (PROZAC) 10 MG CAPSULE] Take 10 mg by mouth daily.  11     furosemide (LASIX) 40 MG tablet TAKE ONE TABLET (40MG) BY MOUTH DAILY 90 tablet 3     KLOR-CON M20 20 MEQ CR tablet TAKE ONE TABLET BY MOUTH ONE TIME DAILY 90 tablet 0     omeprazole (PRILOSEC) 20 MG DR capsule 1 capsule 30 minutes before morning meal Orally Once a day for 90 day(s)       sotalol (BETAPACE) 80 MG tablet TAKE ONE TABLET BY MOUTH TWICE DAILY 180 tablet 0     amoxicillin (AMOXIL) 500 MG capsule [AMOXICILLIN (AMOXIL) 500 MG CAPSULE] daily as needed (With dental appointments).  (Patient not taking: Reported on 6/3/2025)  0       Allergies   Allergen Reactions     Aspirin      Atorvastatin Unknown     numbness     Pravastatin Unknown     numbness     Iodine Rash          Lab Results    Chemistry/lipid CBC Cardiac Enzymes/BNP/TSH/INR   Recent Labs   Lab Test 04/02/25  1208   CHOL 175   HDL 36*   LDL 88   TRIG 255*     Recent Labs   Lab Test 04/02/25  1208 04/05/24  1119 05/19/23  1156   LDL 88 113* 105*     Recent Labs   Lab Test 04/02/25  1208      POTASSIUM 3.5   CHLORIDE 99   CO2 28   GLC 86   BUN 17.3   CR 0.74   GFRESTIMATED 80   SARAI 9.6     Recent Labs   Lab Test 04/02/25  1208 04/05/24  1119 06/21/23  0810   CR 0.74 0.81 0.73     No results for input(s): \"A1C\" in the last 31923 hours.       Recent Labs   Lab Test 06/21/23  0810 06/17/23  1029 09/12/18  2343   WBC  --   --  8.8   HGB 11.5*   < > 12.9   HCT  --   --  39.4   MCV  --   --  85   PLT  --   --  310    < > = values in this interval not displayed.     Recent Labs   Lab Test 06/21/23  0810 06/18/23  0753 06/17/23  1029   HGB 11.5* 11.5* 12.4    Recent Labs   Lab Test 09/12/18  2343 08/28/18  2001 04/15/18  1742   TROPONINI <0.01 <0.01 0.02     Recent Labs   Lab Test 04/13/18  1504   *     Recent Labs   Lab Test 06/06/22  1147   TSH 1.73 "     Recent Labs   Lab Test 04/13/18  1504   INR 0.99          Medications  Allergies   Current Outpatient Medications   Medication Sig Dispense Refill     acetaminophen (TYLENOL) 500 MG tablet Take 1,000 mg by mouth 3 times daily as needed for pain       allopurinol (ZYLOPRIM) 100 MG tablet Take 100 mg by mouth three times a week MWF       amLODIPine (NORVASC) 10 MG tablet TAKE ONE TABLET (10MG) BY MOUTH  DAILY 90 tablet 0     atorvastatin (LIPITOR) 20 MG tablet TAKE ONE TABLET BY MOUTH ONE TIME DAILY 90 tablet 0     calcium carbonate (OS-SARAI) 600 mg (1,500 mg) tablet [CALCIUM CARBONATE (OS-SARAI) 600 MG (1,500 MG) TABLET] Take 600 mg by mouth daily.       carboxymethylcellulose PF (REFRESH PLUS) 0.5 % ophthalmic solution Place 1 drop into both eyes 3 times daily as needed for dry eyes       clobetasol (TEMOVATE) 0.05 % external solution Apply topically 2 times daily       ELIQUIS ANTICOAGULANT 5 MG tablet Take 1 tablet (5 mg) by mouth 2 times daily 180 tablet 0     FLUoxetine (PROZAC) 10 MG capsule [FLUOXETINE (PROZAC) 10 MG CAPSULE] Take 10 mg by mouth daily.  11     furosemide (LASIX) 40 MG tablet TAKE ONE TABLET (40MG) BY MOUTH DAILY 90 tablet 3     KLOR-CON M20 20 MEQ CR tablet TAKE ONE TABLET BY MOUTH ONE TIME DAILY 90 tablet 0     omeprazole (PRILOSEC) 20 MG DR capsule 1 capsule 30 minutes before morning meal Orally Once a day for 90 day(s)       sotalol (BETAPACE) 80 MG tablet TAKE ONE TABLET BY MOUTH TWICE DAILY 180 tablet 0     amoxicillin (AMOXIL) 500 MG capsule [AMOXICILLIN (AMOXIL) 500 MG CAPSULE] daily as needed (With dental appointments).  (Patient not taking: Reported on 6/3/2025)  0      Allergies   Allergen Reactions     Aspirin      Atorvastatin Unknown     numbness     Pravastatin Unknown     numbness     Iodine Rash          Lab Results   Lab Results   Component Value Date     04/02/2025    CO2 28 04/02/2025    CO2 28 06/16/2023    BUN 17.3 04/02/2025    BUN 13 06/16/2023     Lab Results    Component Value Date    WBC 8.8 09/12/2018    HGB 11.5 06/21/2023    HCT 39.4 09/12/2018    MCV 85 09/12/2018     09/12/2018     Lab Results   Component Value Date    CHOL 175 04/02/2025    TRIG 255 04/02/2025    HDL 36 04/02/2025     Lab Results   Component Value Date    INR 0.99 04/13/2018     Lab Results   Component Value Date     04/13/2018     Lab Results   Component Value Date    TROPONINI <0.01 09/12/2018    TROPONINI <0.01 08/28/2018    TROPONINI 0.02 04/15/2018     Lab Results   Component Value Date    TSH 1.73 06/06/2022                    Thank you for allowing me to participate in the care of your patient.      Sincerely,     Trevin Soliman MD     [facility]  cc:   Trevin Soliman MD  [address]  [place] [address]

## 2025-09-02 ENCOUNTER — LAB REQUISITION (OUTPATIENT)
Dept: LAB | Facility: CLINIC | Age: 85
End: 2025-09-02

## 2025-09-02 DIAGNOSIS — E87.6 HYPOKALEMIA: ICD-10-CM

## 2025-09-02 PROCEDURE — 84132 ASSAY OF SERUM POTASSIUM: CPT | Performed by: NURSE PRACTITIONER

## 2025-09-03 LAB — POTASSIUM SERPL-SCNC: 3.1 MMOL/L (ref 3.4–5.3)

## (undated) DEVICE — BLADE SAW SAGITTAL STRK WIDE 25.4X85X1.2MM 2108-151-000

## (undated) DEVICE — GLOVE BIOGEL PI ULTRATOUCH G SZ 7.5 42175

## (undated) DEVICE — GLOVE BIOGEL PI INDICATOR 8.0 LF 41680

## (undated) DEVICE — PLATE GROUNDING ADULT W/CORD 9165L

## (undated) DEVICE — GLOVE BIOGEL PI ORTHOPRO SZ 7.5 47675

## (undated) DEVICE — CUSTOM PACK TOTAL KNEE ACCESSORY SOP5BTAHEA

## (undated) DEVICE — SOL NACL 0.9% IRRIG 1000ML BOTTLE 2F7124

## (undated) DEVICE — PREP CHLORAPREP W/ORANGE TINT 10.5ML 930715

## (undated) DEVICE — DRESSING MEPILEX AG SILVER 4X12 395990

## (undated) DEVICE — GOWN IMPERVIOUS BREATHABLE SMART XLG 89045

## (undated) DEVICE — CAST PADDING 6" STERILE 9046S

## (undated) DEVICE — SU MONOCRYL 3-0 PS-2 18" UND MCP497G

## (undated) DEVICE — HOLDER LIMB VELCRO OR 0814-1533

## (undated) DEVICE — SOLUTION IRRIG 2B7127 .9NS 3000ML BAG

## (undated) DEVICE — SU ETHIBOND 5 V-37 4X30" MB66G

## (undated) DEVICE — CUFF TOURN 24IN STRL DISP

## (undated) DEVICE — A3 SUPPLIES- SEE NURSING INFO PAGE

## (undated) DEVICE — SUCTION MANIFOLD NEPTUNE 2 SYS 4 PORT 0702-020-000

## (undated) DEVICE — SOL WATER IRRIG 1000ML BOTTLE 2F7114

## (undated) DEVICE — GLOVE BIOGEL INDICATOR 7.5 LF 41675

## (undated) DEVICE — DECANTER VIAL 2006S

## (undated) DEVICE — CUSTOM PACK TOTAL KNEE SOP5BTKHEC

## (undated) DEVICE — SUTURE VICRYL+ 2-0 27IN CT-1 UND VCP259H

## (undated) DEVICE — SUTURE VICRYL+ 0 27IN CT-1 UND VCP260H

## (undated) DEVICE — SU STRATAFIX PDS PLUS 1 CT-1 18" SXPP1A404

## (undated) DEVICE — BANDAGE ELASTIC 6X550 LF DBL 593-96LF

## (undated) RX ORDER — LIDOCAINE HYDROCHLORIDE 10 MG/ML
INJECTION, SOLUTION EPIDURAL; INFILTRATION; INTRACAUDAL; PERINEURAL
Status: DISPENSED
Start: 2023-06-16

## (undated) RX ORDER — FENTANYL CITRATE-0.9 % NACL/PF 10 MCG/ML
PLASTIC BAG, INJECTION (ML) INTRAVENOUS
Status: DISPENSED
Start: 2023-06-16

## (undated) RX ORDER — EPHEDRINE SULFATE 50 MG/ML
INJECTION, SOLUTION INTRAMUSCULAR; INTRAVENOUS; SUBCUTANEOUS
Status: DISPENSED
Start: 2023-06-16

## (undated) RX ORDER — GLYCOPYRROLATE 0.2 MG/ML
INJECTION, SOLUTION INTRAMUSCULAR; INTRAVENOUS
Status: DISPENSED
Start: 2023-06-16

## (undated) RX ORDER — PHENYLEPHRINE HYDROCHLORIDE 10 MG/ML
INJECTION INTRAVENOUS
Status: DISPENSED
Start: 2023-06-16

## (undated) RX ORDER — PROPOFOL 10 MG/ML
INJECTION, EMULSION INTRAVENOUS
Status: DISPENSED
Start: 2023-06-16

## (undated) RX ORDER — ONDANSETRON 2 MG/ML
INJECTION INTRAMUSCULAR; INTRAVENOUS
Status: DISPENSED
Start: 2023-06-16

## (undated) RX ORDER — DEXAMETHASONE SODIUM PHOSPHATE 10 MG/ML
INJECTION, EMULSION INTRAMUSCULAR; INTRAVENOUS
Status: DISPENSED
Start: 2023-06-16